# Patient Record
Sex: MALE | Race: WHITE | Employment: OTHER | URBAN - METROPOLITAN AREA
[De-identification: names, ages, dates, MRNs, and addresses within clinical notes are randomized per-mention and may not be internally consistent; named-entity substitution may affect disease eponyms.]

---

## 2020-01-17 ENCOUNTER — ED HISTORICAL/CONVERTED ENCOUNTER (OUTPATIENT)
Dept: OTHER | Age: 50
End: 2020-01-17

## 2020-07-27 VITALS — SYSTOLIC BLOOD PRESSURE: 144 MMHG | TEMPERATURE: 97.7 F | DIASTOLIC BLOOD PRESSURE: 85 MMHG | HEIGHT: 69 IN

## 2020-07-27 PROBLEM — I10 HYPERTENSION: Status: ACTIVE | Noted: 2020-07-27

## 2020-07-27 PROBLEM — I82.409 DVT (DEEP VENOUS THROMBOSIS) (HCC): Status: ACTIVE | Noted: 2020-07-27

## 2020-09-03 VITALS — SYSTOLIC BLOOD PRESSURE: 144 MMHG | TEMPERATURE: 97.7 F | DIASTOLIC BLOOD PRESSURE: 85 MMHG | HEIGHT: 69 IN

## 2020-09-03 PROBLEM — R30.0 DYSURIA: Status: ACTIVE | Noted: 2020-09-03

## 2020-09-03 RX ORDER — CIPROFLOXACIN 500 MG/1
TABLET ORAL 2 TIMES DAILY
COMMUNITY
End: 2022-08-18

## 2020-09-03 RX ORDER — CEFUROXIME AXETIL 500 MG/1
TABLET ORAL 2 TIMES DAILY
COMMUNITY
End: 2022-08-18

## 2020-09-03 RX ORDER — SULFAMETHOXAZOLE AND TRIMETHOPRIM 800; 160 MG/1; MG/1
1 TABLET ORAL 2 TIMES DAILY
COMMUNITY
End: 2022-08-18

## 2020-09-03 RX ORDER — LISINOPRIL AND HYDROCHLOROTHIAZIDE 20; 25 MG/1; MG/1
TABLET ORAL DAILY
COMMUNITY
End: 2022-08-18

## 2022-03-18 PROBLEM — I82.409 DVT (DEEP VENOUS THROMBOSIS) (HCC): Status: ACTIVE | Noted: 2020-07-27

## 2022-03-18 PROBLEM — R30.0 DYSURIA: Status: ACTIVE | Noted: 2020-09-03

## 2022-03-19 PROBLEM — I10 HYPERTENSION: Status: ACTIVE | Noted: 2020-07-27

## 2022-08-17 ENCOUNTER — HOSPITAL ENCOUNTER (EMERGENCY)
Age: 52
Discharge: HOME | DRG: 872 | End: 2022-08-17
Attending: EMERGENCY MEDICINE
Payer: MEDICARE

## 2022-08-17 ENCOUNTER — APPOINTMENT (OUTPATIENT)
Dept: ULTRASOUND IMAGING | Age: 52
DRG: 872 | End: 2022-08-17
Attending: EMERGENCY MEDICINE
Payer: MEDICARE

## 2022-08-17 VITALS
DIASTOLIC BLOOD PRESSURE: 67 MMHG | HEART RATE: 60 BPM | BODY MASS INDEX: 42.66 KG/M2 | RESPIRATION RATE: 18 BRPM | HEIGHT: 72 IN | WEIGHT: 315 LBS | OXYGEN SATURATION: 97 % | TEMPERATURE: 98 F | SYSTOLIC BLOOD PRESSURE: 128 MMHG

## 2022-08-17 DIAGNOSIS — R33.9 URINARY RETENTION: Primary | ICD-10-CM

## 2022-08-17 DIAGNOSIS — N50.89 SCROTAL SWELLING: ICD-10-CM

## 2022-08-17 LAB
APPEARANCE UR: ABNORMAL
BACTERIA URNS QL MICRO: ABNORMAL /HPF
BILIRUB UR QL: NEGATIVE
COLOR UR: ABNORMAL
GLUCOSE UR STRIP.AUTO-MCNC: NEGATIVE MG/DL
HGB UR QL STRIP: ABNORMAL
KETONES UR QL STRIP.AUTO: 15 MG/DL
LEUKOCYTE ESTERASE UR QL STRIP.AUTO: ABNORMAL
MUCOUS THREADS URNS QL MICRO: ABNORMAL /LPF
NITRITE UR QL STRIP.AUTO: NEGATIVE
PH UR STRIP: >8.5 [PH]
PROT UR STRIP-MCNC: 100 MG/DL
RBC #/AREA URNS HPF: >100 /HPF (ref 0–5)
SP GR UR REFRACTOMETRY: 1.01 (ref 1–1.03)
UA: UC IF INDICATED,UAUC: ABNORMAL
UROBILINOGEN UR QL STRIP.AUTO: 0.1 EU/DL (ref 0.2–1)
WBC URNS QL MICRO: ABNORMAL /HPF (ref 0–4)

## 2022-08-17 PROCEDURE — 76870 US EXAM SCROTUM: CPT

## 2022-08-17 PROCEDURE — 74011250637 HC RX REV CODE- 250/637: Performed by: EMERGENCY MEDICINE

## 2022-08-17 PROCEDURE — 81001 URINALYSIS AUTO W/SCOPE: CPT

## 2022-08-17 PROCEDURE — 87186 SC STD MICRODIL/AGAR DIL: CPT

## 2022-08-17 PROCEDURE — 87086 URINE CULTURE/COLONY COUNT: CPT

## 2022-08-17 PROCEDURE — 87077 CULTURE AEROBIC IDENTIFY: CPT

## 2022-08-17 RX ORDER — FUROSEMIDE 20 MG/1
20 TABLET ORAL DAILY
Qty: 5 TABLET | Refills: 0 | Status: SHIPPED | OUTPATIENT
Start: 2022-08-17 | End: 2022-08-25

## 2022-08-17 RX ORDER — BUMETANIDE 1 MG/1
2 TABLET ORAL
Status: COMPLETED | OUTPATIENT
Start: 2022-08-17 | End: 2022-08-17

## 2022-08-17 RX ADMIN — BUMETANIDE 2 MG: 1 TABLET ORAL at 20:09

## 2022-08-17 NOTE — ED TRIAGE NOTES
Pt had fall on Sunday, has had pain and swelling to testicle(s) for 24 hours, along with incontinence when he lies down. A&O x 4, denies striking his head.

## 2022-08-17 NOTE — DISCHARGE INSTRUCTIONS
Thank you! Thank you for allowing me to care for you in the emergency department. It is my goal to provide you with excellent care. If you have not received excellent quality care, please ask to speak to the nurse manager. Please fill out the survey that will come to you by mail or email since we listen to your feedback! Below you will find a list of your tests from today's visit. Should you have any questions, please do not hesitate to call the emergency department. Labs  No results found for this or any previous visit (from the past 12 hour(s)). Radiologic Studies  US SCROTUM/TESTICLES   Final Result      1. Extensive scrotal soft tissue swelling/thickening. 2. No focal fluid collection to suggest abscess. 3. Normal sonographic imaging of the testes. CT Results  (Last 48 hours)      None          CXR Results  (Last 48 hours)      None          ------------------------------------------------------------------------------------------------------------  The exam and treatment you received in the Emergency Department were for an urgent problem and are not intended as complete care. It is important that you follow-up with a doctor, nurse practitioner, or physician assistant to:  (1) confirm your diagnosis,  (2) re-evaluation of changes in your illness and treatment, and  (3) for ongoing care. Please take your discharge instructions with you when you go to your follow-up appointment. If you have any problem arranging a follow-up appointment, contact the Emergency Department. If your symptoms become worse or you do not improve as expected and you are unable to reach your health care provider, please return to the Emergency Department. We are available 24 hours a day. If a prescription has been provided, please have it filled as soon as possible to prevent a delay in treatment.  If you have any questions or reservations about taking the medication due to side effects or interactions with other medications, please call your primary care provider or contact the ER.

## 2022-08-17 NOTE — ED PROVIDER NOTES
EMERGENCY DEPARTMENT HISTORY AND PHYSICAL EXAM      Date: 8/17/2022  Patient Name: Katelyn Sims. Patient Age and Sex: 47 y.o. male     History of Presenting Illness     Chief Complaint   Patient presents with    Testicle Pain       History Provided By: Patient    HPI: Katelyn Sims. is a 50-year-old male with a history of hypertension, penile surgery in the past, morbid obesity, presenting for difficulty urinating and testicle swelling. Patient states that 3 days ago he fell on his rear end. Patient states that yesterday started noticed swelling of his scrotum but no blood noted. His penis was also swollen and he has been having increased difficulty urinating. States that this has happened before and he needed a Siddiqi to be placed. Does have a urologist.  States he had a penile surgery but cannot remember exactly what it was. There are no other complaints, changes, or physical findings at this time. PCP: Martin Raymundo, DO    No current facility-administered medications on file prior to encounter. Current Outpatient Medications on File Prior to Encounter   Medication Sig Dispense Refill    trimethoprim-sulfamethoxazole (BACTRIM DS, SEPTRA DS) 160-800 mg per tablet Take 1 Tab by mouth two (2) times a day. cefUROXime (CEFTIN) 500 mg tablet Take  by mouth two (2) times a day. ciprofloxacin HCl (Cipro) 500 mg tablet Take  by mouth two (2) times a day. lisinopril-hydroCHLOROthiazide (PRINZIDE, ZESTORETIC) 20-25 mg per tablet Take  by mouth daily.          Past History     Past Medical History:  Past Medical History:   Diagnosis Date    DVT (deep venous thrombosis) ([facility] [address]     Hypertension 7/27/2020       Past Surgical History:  Past Surgical History:   Procedure Laterality Date    HX ORTHOPAEDIC      Ankle    HX ORTHOPAEDIC      Tumors removed from left knee     HX UROLOGICAL      Penile surgery       Family History:  Family History   Problem Relation Age of Onset    Hypertension Mother     Lung Disease Father        Social History:  Social History     Tobacco Use    Smoking status: Never    Smokeless tobacco: Never   Substance Use Topics    Drug use: Not Currently       Allergies:  No Known Allergies      Review of Systems   Review of Systems   Constitutional:  Negative for chills and fever. Respiratory:  Negative for cough and shortness of breath. Cardiovascular:  Negative for chest pain. Gastrointestinal:  Negative for abdominal pain, constipation, diarrhea, nausea and vomiting. Genitourinary:  Positive for difficulty urinating, penile swelling, scrotal swelling and testicular pain. Negative for dysuria, frequency, hematuria and penile discharge. Neurological:  Negative for weakness and numbness. All other systems reviewed and are negative. Physical Exam   Physical Exam  Vitals and nursing note reviewed. Constitutional:       Appearance: He is well-developed. He is obese. HENT:      Head: Normocephalic and atraumatic. Nose: Nose normal.      Mouth/Throat:      Mouth: Mucous membranes are moist.   Eyes:      Extraocular Movements: Extraocular movements intact. Conjunctiva/sclera: Conjunctivae normal.   Cardiovascular:      Rate and Rhythm: Normal rate and regular rhythm. Pulmonary:      Effort: Pulmonary effort is normal. No respiratory distress. Breath sounds: Normal breath sounds. Abdominal:      General: There is no distension. Palpations: Abdomen is soft. Tenderness: There is no abdominal tenderness. Genitourinary:     Comments: Patient does appear to have a splint penis very swollen over the shaft and retracted. No tenderness there. His scrotum is very swollen with no significant tenderness over the testicle or the epididymis. Musculoskeletal:         General: Normal range of motion. Cervical back: Normal range of motion and neck supple. Right lower leg: Edema present. Left lower leg: Edema present.    Skin: General: Skin is warm and dry. Neurological:      General: No focal deficit present. Mental Status: He is alert and oriented to person, place, and time. Mental status is at baseline. Psychiatric:         Mood and Affect: Mood normal.        Diagnostic Study Results     Labs -   No results found for this or any previous visit (from the past 12 hour(s)). Radiologic Studies -   US SCROTUM/TESTICLES   Final Result      1. Extensive scrotal soft tissue swelling/thickening. 2. No focal fluid collection to suggest abscess. 3. Normal sonographic imaging of the testes. CT Results  (Last 48 hours)      None          CXR Results  (Last 48 hours)      None              Medical Decision Making   I am the first provider for this patient. I reviewed the vital signs, available nursing notes, past medical history, past surgical history, family history and social history. Vital Signs-Reviewed the patient's vital signs. Patient Vitals for the past 12 hrs:   Temp Pulse Resp BP SpO2   08/17/22 2009 -- 60 -- 128/67 --   08/17/22 1700 98 °F (36.7 °C) (!) 55 18 122/72 97 %       Records Reviewed: Nursing Notes and Old Medical Records    Provider Notes (Medical Decision Making):   Presenting with testicular and scrotum swelling as well as penile swelling with urinary retention issues. Differential includes retention secondary to prostate issues urinary tract infection, anatomical issues, orchitis, testicular torsion, edema. Plan will be to get scrotal ultrasound, place Siddiqi, give Bumex. ED Course:   Initial assessment performed. The patients presenting problems have been discussed, and they are in agreement with the care plan formulated and outlined with them. I have encouraged them to ask questions as they arise throughout their visit.     ED Course as of 08/17/22 2108   Wed Aug 17, 2022   1719  s/p surgical excision of fungating penile mass with incision and drainage of scrotal abscess, liberation of buried penis, phalloplasty, local tissue rearrangement using scrotal skin, and urethral dilation on 08/17/2020 with Dr. Harvey Kline. 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 [JS]   1938 Urinalysis machine is down so long turnaround times. Patient fine with me calling him if positive urinalysis. [JS]      ED Course User Index  [JS] Michelle Gaytan MD     Critical Care Time:   0    Disposition:  Discharge Note:  The patient has been re-evaluated and is ready for discharge. Reviewed available results with patient. Counseled patient on diagnosis and care plan. Patient has expressed understanding, and all questions have been answered. Patient agrees with plan and agrees to follow up as recommended, or to return to the ED if their symptoms worsen. Discharge instructions have been provided and explained to the patient, along with reasons to return to the ED. PLAN:  Discharge Medication List as of 8/17/2022  7:53 PM        START taking these medications    Details   furosemide (Lasix) 20 mg tablet Take 1 Tablet by mouth daily for 5 days. , Normal, Disp-5 Tablet, R-0           CONTINUE these medications which have NOT CHANGED    Details   trimethoprim-sulfamethoxazole (BACTRIM DS, SEPTRA DS) 160-800 mg per tablet Take 1 Tab by mouth two (2) times a day., Historical Med      cefUROXime (CEFTIN) 500 mg tablet Take  by mouth two (2) times a day., Historical Med      ciprofloxacin HCl (Cipro) 500 mg tablet Take  by mouth two (2) times a day., Historical Med      lisinopril-hydroCHLOROthiazide (PRINZIDE, ZESTORETIC) 20-25 mg per tablet Take  by mouth daily. , Historical Med           2. Follow-up Information       Follow up With Specialties Details Why Contact Info    Your Urologist              3.  Return to ED if worse     Diagnosis     Clinical Impression:   1. Urinary retention    2.  Scrotal swelling        Attestations:  Luz BIRMINGHAM M.D., am the primary clinician of record. Please note that this dictation was completed with [employer] the [employer] voice recognition software. Quite often unanticipated grammatical, syntax, homophones, and other interpretive errors are inadvertently transcribed by the computer software. Please disregard these errors. Please excuse any errors that have escaped final proofreading. Thank you.

## 2022-08-18 ENCOUNTER — HOSPITAL ENCOUNTER (INPATIENT)
Age: 52
LOS: 7 days | Discharge: HOME HEALTH | DRG: 872 | End: 2022-08-25
Attending: EMERGENCY MEDICINE | Admitting: HOSPITALIST
Payer: MEDICARE

## 2022-08-18 ENCOUNTER — APPOINTMENT (OUTPATIENT)
Dept: GENERAL RADIOLOGY | Age: 52
DRG: 872 | End: 2022-08-18
Attending: EMERGENCY MEDICINE
Payer: MEDICARE

## 2022-08-18 DIAGNOSIS — R65.10 SIRS (SYSTEMIC INFLAMMATORY RESPONSE SYNDROME) (HCC): ICD-10-CM

## 2022-08-18 DIAGNOSIS — I89.0 LYMPHEDEMA: ICD-10-CM

## 2022-08-18 DIAGNOSIS — N30.00 ACUTE CYSTITIS WITHOUT HEMATURIA: ICD-10-CM

## 2022-08-18 DIAGNOSIS — I48.91 ATRIAL FIBRILLATION, UNSPECIFIED TYPE (HCC): Primary | ICD-10-CM

## 2022-08-18 DIAGNOSIS — R31.0 HEMATURIA, GROSS: ICD-10-CM

## 2022-08-18 DIAGNOSIS — E66.01 MORBID OBESITY WITH BMI OF 70 AND OVER, ADULT (HCC): ICD-10-CM

## 2022-08-18 DIAGNOSIS — N49.2 CELLULITIS OF SCROTUM: ICD-10-CM

## 2022-08-18 DIAGNOSIS — N50.89 TESTICULAR SWELLING: ICD-10-CM

## 2022-08-18 DIAGNOSIS — A41.9 SEPSIS WITHOUT ACUTE ORGAN DYSFUNCTION, DUE TO UNSPECIFIED ORGANISM (HCC): ICD-10-CM

## 2022-08-18 PROBLEM — N17.9 ACUTE RENAL FAILURE (ARF) (HCC): Status: ACTIVE | Noted: 2022-08-18

## 2022-08-18 PROBLEM — N17.9 ACUTE RENAL FAILURE (HCC): Status: ACTIVE | Noted: 2022-08-18

## 2022-08-18 LAB
ALBUMIN SERPL-MCNC: 2.1 G/DL (ref 3.5–5)
ALBUMIN/GLOB SERPL: 0.4 {RATIO} (ref 1.1–2.2)
ALP SERPL-CCNC: 146 U/L (ref 45–117)
ALT SERPL-CCNC: 29 U/L (ref 12–78)
ANION GAP SERPL CALC-SCNC: 10 MMOL/L (ref 5–15)
AST SERPL W P-5'-P-CCNC: 44 U/L (ref 15–37)
BACTERIA URNS QL MICRO: ABNORMAL /HPF
BASOPHILS # BLD: 0 K/UL (ref 0–0.1)
BASOPHILS NFR BLD: 0 % (ref 0–1)
BILIRUB SERPL-MCNC: 2.8 MG/DL (ref 0.2–1)
BNP SERPL-MCNC: 7702 PG/ML
BUN SERPL-MCNC: 62 MG/DL (ref 6–20)
BUN/CREAT SERPL: 28 (ref 12–20)
CA-I BLD-MCNC: 8.9 MG/DL (ref 8.5–10.1)
CHLORIDE SERPL-SCNC: 100 MMOL/L (ref 97–108)
CK SERPL-CCNC: 29 U/L (ref 39–308)
CO2 SERPL-SCNC: 22 MMOL/L (ref 21–32)
CREAT SERPL-MCNC: 2.2 MG/DL (ref 0.7–1.3)
CRP SERPL-MCNC: 13.6 MG/DL (ref 0–0.6)
DIFFERENTIAL METHOD BLD: ABNORMAL
EOSINOPHIL # BLD: 0 K/UL (ref 0–0.4)
EOSINOPHIL NFR BLD: 0 % (ref 0–7)
ERYTHROCYTE [DISTWIDTH] IN BLOOD BY AUTOMATED COUNT: 15.5 % (ref 11.5–14.5)
ERYTHROCYTE [SEDIMENTATION RATE] IN BLOOD: 94 MM/HR (ref 0–20)
GLOBULIN SER CALC-MCNC: 5.4 G/DL (ref 2–4)
GLUCOSE SERPL-MCNC: 178 MG/DL (ref 65–100)
HCT VFR BLD AUTO: 35.1 % (ref 36.6–50.3)
HGB BLD-MCNC: 11.2 G/DL (ref 12.1–17)
IMM GRANULOCYTES # BLD AUTO: 0 K/UL
IMM GRANULOCYTES NFR BLD AUTO: 0 %
INR PPP: 1.1 (ref 0.9–1.1)
LYMPHOCYTES # BLD: 2.8 K/UL (ref 0.8–3.5)
LYMPHOCYTES NFR BLD: 16 % (ref 12–49)
MAGNESIUM SERPL-MCNC: 2.5 MG/DL (ref 1.6–2.4)
MCH RBC QN AUTO: 27.5 PG (ref 26–34)
MCHC RBC AUTO-ENTMCNC: 31.9 G/DL (ref 30–36.5)
MCV RBC AUTO: 86 FL (ref 80–99)
MONOCYTES # BLD: 1.2 K/UL (ref 0–1)
MONOCYTES NFR BLD: 7 % (ref 5–13)
MUCOUS THREADS URNS QL MICRO: ABNORMAL /LPF
NEUTS SEG # BLD: 13.3 K/UL (ref 1.8–8)
NEUTS SEG NFR BLD: 76 % (ref 32–75)
NRBC # BLD: 0 K/UL (ref 0–0.01)
NRBC BLD-RTO: 0 PER 100 WBC
OTHER CELLS NFR BLD MANUAL: 1 %
PLATELET # BLD AUTO: 117 K/UL (ref 150–400)
PMV BLD AUTO: 10.9 FL (ref 8.9–12.9)
POTASSIUM SERPL-SCNC: 4.6 MMOL/L (ref 3.5–5.1)
PROCALCITONIN SERPL-MCNC: 10.44 NG/ML
PROT SERPL-MCNC: 7.5 G/DL (ref 6.4–8.2)
PROTHROMBIN TIME: 14.3 SEC (ref 11.9–14.6)
RBC # BLD AUTO: 4.08 M/UL (ref 4.1–5.7)
RBC #/AREA URNS HPF: ABNORMAL /HPF (ref 0–5)
RBC MORPH BLD: ABNORMAL
SODIUM SERPL-SCNC: 132 MMOL/L (ref 136–145)
TROPONIN-HIGH SENSITIVITY: 7 NG/L (ref 0–76)
TROPONIN-HIGH SENSITIVITY: 7 NG/L (ref 0–76)
TSH SERPL DL<=0.05 MIU/L-ACNC: 4.74 UIU/ML (ref 0.36–3.74)
URATE SERPL-MCNC: 13.3 MG/DL (ref 3.5–7.2)
WBC # BLD AUTO: 17.5 K/UL (ref 4.1–11.1)
WBC URNS QL MICRO: ABNORMAL /HPF (ref 0–4)

## 2022-08-18 PROCEDURE — 74011000258 HC RX REV CODE- 258: Performed by: HOSPITALIST

## 2022-08-18 PROCEDURE — 82550 ASSAY OF CK (CPK): CPT

## 2022-08-18 PROCEDURE — 87186 SC STD MICRODIL/AGAR DIL: CPT

## 2022-08-18 PROCEDURE — 86140 C-REACTIVE PROTEIN: CPT

## 2022-08-18 PROCEDURE — 81001 URINALYSIS AUTO W/SCOPE: CPT

## 2022-08-18 PROCEDURE — 96376 TX/PRO/DX INJ SAME DRUG ADON: CPT

## 2022-08-18 PROCEDURE — 85652 RBC SED RATE AUTOMATED: CPT

## 2022-08-18 PROCEDURE — 87040 BLOOD CULTURE FOR BACTERIA: CPT

## 2022-08-18 PROCEDURE — 84145 PROCALCITONIN (PCT): CPT

## 2022-08-18 PROCEDURE — 93005 ELECTROCARDIOGRAM TRACING: CPT

## 2022-08-18 PROCEDURE — 84484 ASSAY OF TROPONIN QUANT: CPT

## 2022-08-18 PROCEDURE — 74011000250 HC RX REV CODE- 250: Performed by: HOSPITALIST

## 2022-08-18 PROCEDURE — 99222 1ST HOSP IP/OBS MODERATE 55: CPT | Performed by: INTERNAL MEDICINE

## 2022-08-18 PROCEDURE — 74011000250 HC RX REV CODE- 250: Performed by: EMERGENCY MEDICINE

## 2022-08-18 PROCEDURE — 84443 ASSAY THYROID STIM HORMONE: CPT

## 2022-08-18 PROCEDURE — 65270000029 HC RM PRIVATE

## 2022-08-18 PROCEDURE — 80053 COMPREHEN METABOLIC PANEL: CPT

## 2022-08-18 PROCEDURE — 36415 COLL VENOUS BLD VENIPUNCTURE: CPT

## 2022-08-18 PROCEDURE — 71045 X-RAY EXAM CHEST 1 VIEW: CPT

## 2022-08-18 PROCEDURE — 85610 PROTHROMBIN TIME: CPT

## 2022-08-18 PROCEDURE — 74011000258 HC RX REV CODE- 258: Performed by: EMERGENCY MEDICINE

## 2022-08-18 PROCEDURE — 84550 ASSAY OF BLOOD/URIC ACID: CPT

## 2022-08-18 PROCEDURE — 94762 N-INVAS EAR/PLS OXIMTRY CONT: CPT

## 2022-08-18 PROCEDURE — 85025 COMPLETE CBC W/AUTO DIFF WBC: CPT

## 2022-08-18 PROCEDURE — 83880 ASSAY OF NATRIURETIC PEPTIDE: CPT

## 2022-08-18 PROCEDURE — 74011250636 HC RX REV CODE- 250/636: Performed by: EMERGENCY MEDICINE

## 2022-08-18 PROCEDURE — 74011250637 HC RX REV CODE- 250/637: Performed by: HOSPITALIST

## 2022-08-18 PROCEDURE — 96366 THER/PROPH/DIAG IV INF ADDON: CPT

## 2022-08-18 PROCEDURE — 96375 TX/PRO/DX INJ NEW DRUG ADDON: CPT

## 2022-08-18 PROCEDURE — 74011250636 HC RX REV CODE- 250/636: Performed by: HOSPITALIST

## 2022-08-18 PROCEDURE — 87077 CULTURE AEROBIC IDENTIFY: CPT

## 2022-08-18 PROCEDURE — 99285 EMERGENCY DEPT VISIT HI MDM: CPT

## 2022-08-18 PROCEDURE — 83735 ASSAY OF MAGNESIUM: CPT

## 2022-08-18 PROCEDURE — 96365 THER/PROPH/DIAG IV INF INIT: CPT

## 2022-08-18 RX ORDER — MORPHINE SULFATE 4 MG/ML
4 INJECTION INTRAVENOUS ONCE
Status: COMPLETED | OUTPATIENT
Start: 2022-08-18 | End: 2022-08-18

## 2022-08-18 RX ORDER — SODIUM CHLORIDE 0.9 % (FLUSH) 0.9 %
5-40 SYRINGE (ML) INJECTION AS NEEDED
Status: DISCONTINUED | OUTPATIENT
Start: 2022-08-18 | End: 2022-08-26 | Stop reason: HOSPADM

## 2022-08-18 RX ORDER — ONDANSETRON 2 MG/ML
4 INJECTION INTRAMUSCULAR; INTRAVENOUS
Status: DISCONTINUED | OUTPATIENT
Start: 2022-08-18 | End: 2022-08-26 | Stop reason: HOSPADM

## 2022-08-18 RX ORDER — ACETAMINOPHEN 325 MG/1
650 TABLET ORAL
Status: DISCONTINUED | OUTPATIENT
Start: 2022-08-18 | End: 2022-08-26 | Stop reason: HOSPADM

## 2022-08-18 RX ORDER — ACETAMINOPHEN 650 MG/1
650 SUPPOSITORY RECTAL
Status: DISCONTINUED | OUTPATIENT
Start: 2022-08-18 | End: 2022-08-26 | Stop reason: HOSPADM

## 2022-08-18 RX ORDER — ONDANSETRON 4 MG/1
4 TABLET, ORALLY DISINTEGRATING ORAL
Status: DISCONTINUED | OUTPATIENT
Start: 2022-08-18 | End: 2022-08-26 | Stop reason: HOSPADM

## 2022-08-18 RX ORDER — ENOXAPARIN SODIUM 100 MG/ML
40 INJECTION SUBCUTANEOUS DAILY
Status: DISCONTINUED | OUTPATIENT
Start: 2022-08-19 | End: 2022-08-19

## 2022-08-18 RX ORDER — SODIUM CHLORIDE 0.9 % (FLUSH) 0.9 %
5-40 SYRINGE (ML) INJECTION EVERY 8 HOURS
Status: DISCONTINUED | OUTPATIENT
Start: 2022-08-18 | End: 2022-08-26 | Stop reason: HOSPADM

## 2022-08-18 RX ORDER — DILTIAZEM HYDROCHLORIDE 5 MG/ML
20 INJECTION INTRAVENOUS
Status: COMPLETED | OUTPATIENT
Start: 2022-08-18 | End: 2022-08-18

## 2022-08-18 RX ADMIN — SODIUM CHLORIDE, PRESERVATIVE FREE 10 ML: 5 INJECTION INTRAVENOUS at 22:00

## 2022-08-18 RX ADMIN — DILTIAZEM HYDROCHLORIDE 15 MG/HR: 5 INJECTION, SOLUTION INTRAVENOUS at 20:54

## 2022-08-18 RX ADMIN — DILTIAZEM HYDROCHLORIDE 2.5 MG/HR: 5 INJECTION, SOLUTION INTRAVENOUS at 17:33

## 2022-08-18 RX ADMIN — LEVOFLOXACIN 750 MG: 500 TABLET, FILM COATED ORAL at 21:55

## 2022-08-18 RX ADMIN — MORPHINE SULFATE 4 MG: 4 INJECTION, SOLUTION INTRAMUSCULAR; INTRAVENOUS at 18:36

## 2022-08-18 RX ADMIN — DILTIAZEM HYDROCHLORIDE 10 MG/HR: 5 INJECTION, SOLUTION INTRAVENOUS at 19:25

## 2022-08-18 RX ADMIN — PIPERACILLIN AND TAZOBACTAM 4.5 G: 4; .5 INJECTION, POWDER, FOR SOLUTION INTRAVENOUS at 21:55

## 2022-08-18 RX ADMIN — SODIUM CHLORIDE 1000 ML: 9 INJECTION, SOLUTION INTRAVENOUS at 21:39

## 2022-08-18 RX ADMIN — DILTIAZEM HYDROCHLORIDE 20 MG: 5 INJECTION, SOLUTION INTRAVENOUS at 17:19

## 2022-08-18 RX ADMIN — MORPHINE SULFATE 4 MG: 4 INJECTION, SOLUTION INTRAMUSCULAR; INTRAVENOUS at 21:00

## 2022-08-18 NOTE — ED PROVIDER NOTES
EMERGENCY DEPARTMENT HISTORY AND PHYSICAL EXAM      Date: 8/18/2022  Patient Name: Kandi Claudio. History of Presenting Illness     Chief Complaint   Patient presents with    Testicle Pain       History Provided By: Patient and EMS    HPI: Kandi Malhotra, 47 y.o. male with a past medical history significant  for DVT and hypertension  presents to the ED with cc of continued scrotal pain today. Patient states that he was seen and evaluated yesterday and had a negative ultrasound. Patient arrives via EMS and his rate is in the 130s. Patient denies any fever, chills, nausea, vomiting, chest pain, shortness of breath, rash, diarrhea, headache, night sweats. Symptoms are mild to moderate and constant    There are no other complaints, changes, or physical findings at this time.     PCP: Radha Hemphill DO    Current Facility-Administered Medications   Medication Dose Route Frequency Provider Last Rate Last Admin    dilTIAZem (CARDIZEM) 125 mg in 0.9% sodium chloride 125 mL ([id number])  0-15 mg/hr IntraVENous TITRATE Radha Heart MD 15 mL/hr at 08/18/22 2054 15 mg/hr at 08/18/22 2054    sodium chloride (NS) flush 5-40 mL  5-40 mL IntraVENous Q8H Deonte Dennis MD   10 mL at 08/18/22 2200    sodium chloride (NS) flush 5-40 mL  5-40 mL IntraVENous PRN Deonte Dennis MD        acetaminophen (TYLENOL) tablet 650 mg  650 mg Oral Q6H PRN Deonte Dennis MD        Or    acetaminophen (TYLENOL) suppository 650 mg  650 mg Rectal Q6H PRN Deonte Dennis MD        ondansetron (ZOFRAN ODT) tablet 4 mg  4 mg Oral Q8H PRN Deonte Dennis MD        Or    ondansetron (ZOFRAN) injection 4 mg  4 mg IntraVENous Q6H PRN Deonte Dennis MD        [START ON 8/19/2022] enoxaparin (LOVENOX) injection 40 mg  40 mg SubCUTAneous DAILY Deonte Dennis MD        levoFLOXacin (LEVAQUIN) tablet 750 mg  750 mg Oral Q24H Deonte Dennis MD   750 mg at 08/18/22 2155 Current Outpatient Medications   Medication Sig Dispense Refill    furosemide (Lasix) 20 mg tablet Take 1 Tablet by mouth daily for 5 days. 5 Tablet 0       Past History   Past Medical History:  Past Medical History:   Diagnosis Date    DVT (deep venous thrombosis) ([facility] [address]     Hypertension 7/27/2020       Past Surgical History:  Past Surgical History:   Procedure Laterality Date    HX ORTHOPAEDIC      Ankle    HX ORTHOPAEDIC      Tumors removed from left knee     HX UROLOGICAL      Penile surgery       Family History:  Family History   Problem Relation Age of Onset    Hypertension Mother     Lung Disease Father        Social History:  Social History     Tobacco Use    Smoking status: Never    Smokeless tobacco: Never   Substance Use Topics    Alcohol use: Not Currently    Drug use: Not Currently       Allergies:  No Known Allergies  Review of Systems   Review of Systems   Constitutional: Negative. Negative for appetite change, chills, fatigue and fever. HENT: Negative. Negative for congestion and sinus pain. Eyes: Negative. Negative for pain and visual disturbance. Respiratory: Negative. Negative for chest tightness and shortness of breath. Cardiovascular: Negative. Negative for chest pain. Gastrointestinal: Negative. Negative for abdominal pain, diarrhea, nausea and vomiting. Genitourinary:  Positive for testicular pain. Negative for difficulty urinating. No discharge   Musculoskeletal: Negative. Negative for arthralgias. Skin: Negative. Negative for rash. Neurological: Negative. Negative for weakness and headaches. Hematological: Negative. Psychiatric/Behavioral: Negative. Negative for agitation. The patient is not nervous/anxious. All other systems reviewed and are negative. Physical Exam   Physical Exam  Vitals and nursing note reviewed. Constitutional:       General: He is not in acute distress. Appearance: He is well-developed.       Comments: Morbidly obese HENT:      Head: Normocephalic and atraumatic. Nose: Nose normal.      Mouth/Throat:      Mouth: Mucous membranes are moist.      Pharynx: Oropharynx is clear. No oropharyngeal exudate. Eyes:      General:         Right eye: No discharge. Left eye: No discharge. Conjunctiva/sclera: Conjunctivae normal.      Pupils: Pupils are equal, round, and reactive to light. Cardiovascular:      Rate and Rhythm: Tachycardia present. Rhythm irregular. Chest Wall: PMI is not displaced. No thrill. Heart sounds: Normal heart sounds. No murmur heard. No friction rub. No gallop. Pulmonary:      Effort: Pulmonary effort is normal. No respiratory distress. Breath sounds: Normal breath sounds. No wheezing or rales. Chest:      Chest wall: No tenderness. Abdominal:      General: Bowel sounds are normal. There is no distension. Palpations: Abdomen is soft. There is no mass. Tenderness: There is no abdominal tenderness. There is no guarding or rebound. Genitourinary:     Comments: Scrotal swelling with cellulitis. Musculoskeletal:         General: Normal range of motion. Cervical back: Normal range of motion and neck supple. Lymphadenopathy:      Cervical: No cervical adenopathy. Skin:     General: Skin is warm and dry. Capillary Refill: Capillary refill takes less than 2 seconds. Findings: No erythema or rash. Neurological:      Mental Status: He is alert and oriented to person, place, and time. Cranial Nerves: No cranial nerve deficit.       Coordination: Coordination normal.   Psychiatric:         Mood and Affect: Mood normal.         Behavior: Behavior normal.       Lab and Diagnostic Study Results   Labs -     Recent Results (from the past 12 hour(s))   TROPONIN-HIGH SENSITIVITY    Collection Time: 08/18/22  6:30 PM   Result Value Ref Range    Troponin-High Sensitivity 7 0 - 76 ng/L   NT-PRO BNP    Collection Time: 08/18/22  6:30 PM   Result Value Ref Range    NT pro-BNP 7,702 (H) <631 pg/mL   METABOLIC PANEL, COMPREHENSIVE    Collection Time: 08/18/22  7:32 PM   Result Value Ref Range    Sodium 132 (L) 136 - 145 mmol/L    Potassium 4.6 3.5 - 5.1 mmol/L    Chloride 100 97 - 108 mmol/L    CO2 22 21 - 32 mmol/L    Anion gap 10 5 - 15 mmol/L    Glucose 178 (H) 65 - 100 mg/dL    BUN 62 (H) 6 - 20 mg/dL    Creatinine 2.20 (H) 0.70 - 1.30 mg/dL    BUN/Creatinine ratio 28 (H) 12 - 20      GFR est AA 38 (L) >60 ml/min/1.73m2    GFR est non-AA 32 (L) >60 ml/min/1.73m2    Calcium 8.9 8.5 - 10.1 mg/dL    Bilirubin, total 2.8 (H) 0.2 - 1.0 mg/dL    AST (SGOT) 44 (H) 15 - 37 U/L    ALT (SGPT) 29 12 - 78 U/L    Alk. phosphatase 146 (H) 45 - 117 U/L    Protein, total 7.5 6.4 - 8.2 g/dL    Albumin 2.1 (L) 3.5 - 5.0 g/dL    Globulin 5.4 (H) 2.0 - 4.0 g/dL    A-G Ratio 0.4 (L) 1.1 - 2.2     MAGNESIUM    Collection Time: 08/18/22  7:32 PM   Result Value Ref Range    Magnesium 2.5 (H) 1.6 - 2.4 mg/dL   PROTHROMBIN TIME + INR    Collection Time: 08/18/22  7:32 PM   Result Value Ref Range    Prothrombin time 14.3 11.9 - 14.6 sec    INR 1.1 0.9 - 1.1     CBC WITH AUTOMATED DIFF    Collection Time: 08/18/22  7:32 PM   Result Value Ref Range    WBC 17.5 (H) 4.1 - 11.1 K/uL    RBC 4.08 (L) 4.10 - 5.70 M/uL    HGB 11.2 (L) 12.1 - 17.0 g/dL    HCT 35.1 (L) 36.6 - 50.3 %    MCV 86.0 80.0 - 99.0 FL    MCH 27.5 26.0 - 34.0 PG    MCHC 31.9 30.0 - 36.5 g/dL    RDW 15.5 (H) 11.5 - 14.5 %    PLATELET 225 (L) 344 - 400 K/uL    MPV 10.9 8.9 - 12.9 FL    NRBC 0.0 0.0  WBC    ABSOLUTE NRBC 0.00 0.00 - 0.01 K/uL    NEUTROPHILS 76 (H) 32 - 75 %    LYMPHOCYTES 16 12 - 49 %    MONOCYTES 7 5 - 13 %    EOSINOPHILS 0 0 - 7 %    BASOPHILS 0 0 - 1 %    OTHER CELL 1 %    IMMATURE GRANULOCYTES 0 %    ABS. NEUTROPHILS 13.3 (H) 1.8 - 8.0 K/UL    ABS. LYMPHOCYTES 2.8 0.8 - 3.5 K/UL    ABS. MONOCYTES 1.2 (H) 0.0 - 1.0 K/UL    ABS. EOSINOPHILS 0.0 0.0 - 0.4 K/UL    ABS. BASOPHILS 0.0 0.0 - 0.1 K/UL    ABS. IMM. Kristieashok Simonnels. 0.0 K/UL    DF Manual      RBC COMMENTS Normocytic, Normochromic     TROPONIN-HIGH SENSITIVITY    Collection Time: 08/18/22  7:32 PM   Result Value Ref Range    Troponin-High Sensitivity 7 0 - 76 ng/L   URIC ACID    Collection Time: 08/18/22  7:32 PM   Result Value Ref Range    Uric acid 13.3 (H) 3.5 - 7.2 mg/dL   TSH 3RD GENERATION    Collection Time: 08/18/22  7:32 PM   Result Value Ref Range    TSH 4.74 (H) 0.36 - 3.74 uIU/mL   CK    Collection Time: 08/18/22  7:32 PM   Result Value Ref Range    CK 29 (L) 39 - 308 U/L   C REACTIVE PROTEIN, QT    Collection Time: 08/18/22  7:32 PM   Result Value Ref Range    C-Reactive protein 13.60 (H) 0.00 - 0.60 mg/dL   SED RATE (ESR)    Collection Time: 08/18/22  7:32 PM   Result Value Ref Range    Sed rate, automated 94 (H) 0 - 20 mm/hr   PROCALCITONIN    Collection Time: 08/18/22  7:32 PM   Result Value Ref Range    Procalcitonin 10.44 (H) 0 ng/mL   URINALYSIS W/ RFLX MICROSCOPIC    Collection Time: 08/18/22  7:34 PM   Result Value Ref Range    Color Yellow/Straw      Appearance Turbid (A) Clear      Specific gravity 1.010 1.003 - 1.030      pH (UA) 6.0      Protein Negative Negative mg/dL    Glucose Negative Negative mg/dL    Ketone Negative Negative mg/dL    Bilirubin Positive (A) Negative      Blood Large (A) Negative      Urobilinogen 4.0 (H) 0.2 - 1.0 EU/dL    Nitrites Negative Negative      Leukocyte Esterase Large (A) Negative     URINE MICROSCOPIC    Collection Time: 08/18/22  7:34 PM   Result Value Ref Range    WBC 20-50 0 - 4 /hpf    RBC 0-5 0 - 5 /hpf    Bacteria 1+ (A) Negative /hpf    Mucus Trace (A) Negative /lpf       Radiologic Studies -   [unfilled]  CT Results  (Last 48 hours)      None          CXR Results  (Last 48 hours)                 08/18/22 1747  XR CHEST PORT Final result    Impression:  Limited examination with no acute infiltrate suspected. Narrative:  Medical indication: A. fib.        Portable AP semiupright view of the chest obtained no prior. Patient's body   habitus limits the evaluation, the inspiration is shallow. No definite focal   infiltrate suspected. Next                   Medical Decision Making and ED Course   - I am the first and primary provider for this patient AND AM THE PRIMARY PROVIDER OF RECORD. I reviewed the vital signs, available nursing notes, past medical history, past surgical history, family history and social history. - Initial assessment performed. The patients presenting problems have been discussed, and the staff are in agreement with the care plan formulated and outlined with them. I have encouraged them to ask questions as they arise throughout their visit. Differential Diagnosis & Medical Decision Making Provider Note:   Patient's scrotal ultrasound yesterday was negative. He is in A. fib with RVR at this point time. We will start with diltiazem    Vital Signs-Reviewed the patient's vital signs. Patient Vitals for the past 12 hrs:   Temp Pulse Resp BP SpO2   08/18/22 2327 -- (!) 147 24 (!) 120/96 95 %   08/18/22 2246 98.5 °F (36.9 °C) (!) 156 23 107/78 97 %   08/18/22 2157 98.2 °F (36.8 °C) (!) 138 20 101/72 98 %   08/18/22 2133 98.1 °F (36.7 °C) (!) 140 24 101/85 96 %   08/18/22 2100 -- (!) 152 17 92/78 98 %   08/18/22 2030 -- (!) 142 -- (!) 109/58 99 %   08/18/22 2000 98.9 °F (37.2 °C) (!) 149 -- 106/80 99 %   08/18/22 1933 98.6 °F (37 °C) (!) 146 20 111/89 99 %   08/18/22 1838 98.2 °F (36.8 °C) (!) 130 20 130/71 99 %   08/18/22 1749 -- (!) 136 20 113/76 100 %   08/18/22 1734 -- -- -- -- 100 %   08/18/22 1731 -- (!) 128 20 100/60 100 %   08/18/22 1712 98.2 °F (36.8 °C) (!) 145 20 (!) 138/106 100 %       EKG interpretation: (Preliminary): Performed at 1714. EKG Interpreted by me. Shows ventricular rate 159 bpm, ND interval unmeasurable, QRS duration 1 4 ms, QTC 4 and 65 ms. Interpretation: A. fib with RVR, nonspecific T wave abnormality, abnormal EKG.     ED Course:   ED Course as of 08/18/22 2349   Thu Aug 18, 2022   2119 Patient is in A. fib with RVR. Will be admitting to the hospital on a diltiazem drip. [CS]      ED Course User Index  [CS] Kelly Cramer MD           Procedures and Critical Care     Performed by: Virginia Bhagat MD  Procedures      CRITICAL CARE NOTE :  5:41 PM  Amount of Critical Care Time: 55 minutes    IMPENDING DETERIORATION -Cardiovascular  ASSOCIATED RISK FACTORS - Dysrhythmia  MANAGEMENT- Bedside Assessment and Supervision of Care  INTERPRETATION -  ECG, Blood Pressure, and Cardiac Output Measures   INTERVENTIONS - hemodynamic mngmt and Metobolic interventions  CASE REVIEW - Hospitalist/Intensivist  TREATMENT RESPONSE -Stable  PERFORMED BY - Self    NOTES   :  I have spent the above critical care time involved in lab review, consultations with specialist, family decision- making, bedside attention and documentation. This time excludes time spent in any separate billed procedures. During this entire length of time I was immediately available to the patient . Virginia Bhagat MD    Disposition   Disposition: Admitted to ICU Medical ICU the case was discussed with the admitting physician     Admitted    Diagnosis/Clinical Impression     Clinical Impression:   1. Atrial fibrillation, unspecified type [facility])        Attestations: Patrick Knox MD, am the primary clinician of record. Please note that this dictation was completed with [employer], the computer voice recognition software. Quite often unanticipated grammatical, syntax, homophones, and other interpretive errors are inadvertently transcribed by the computer software. Please disregard these errors. Please excuse any errors that have escaped final proofreading. Thank you.

## 2022-08-18 NOTE — ED TRIAGE NOTES
Patient is complaining of scrotal swelling, started 3 days ago. Seen yesterday for urinary retention seymour placed.

## 2022-08-19 ENCOUNTER — APPOINTMENT (OUTPATIENT)
Dept: CT IMAGING | Age: 52
DRG: 872 | End: 2022-08-19
Attending: NURSE PRACTITIONER
Payer: MEDICARE

## 2022-08-19 ENCOUNTER — APPOINTMENT (OUTPATIENT)
Dept: ULTRASOUND IMAGING | Age: 52
DRG: 872 | End: 2022-08-19
Attending: EMERGENCY MEDICINE
Payer: MEDICARE

## 2022-08-19 LAB
ALBUMIN SERPL-MCNC: 1.8 G/DL (ref 3.5–5)
ALBUMIN/GLOB SERPL: 0.3 {RATIO} (ref 1.1–2.2)
ALP SERPL-CCNC: 119 U/L (ref 45–117)
ALT SERPL-CCNC: 26 U/L (ref 12–78)
ANION GAP SERPL CALC-SCNC: 8 MMOL/L (ref 5–15)
APPEARANCE UR: ABNORMAL
APTT PPP: 29.5 SEC (ref 21.2–34.1)
APTT PPP: >153 SEC (ref 21.2–34.1)
APTT PPP: >153 SEC (ref 21.2–34.1)
AST SERPL W P-5'-P-CCNC: 27 U/L (ref 15–37)
BACTERIA URNS QL MICRO: NEGATIVE /HPF
BASOPHILS # BLD: 0 K/UL (ref 0–0.1)
BASOPHILS NFR BLD: 0 % (ref 0–1)
BILIRUB SERPL-MCNC: 2.3 MG/DL (ref 0.2–1)
BILIRUB UR QL: POSITIVE
BUN SERPL-MCNC: 54 MG/DL (ref 6–20)
BUN/CREAT SERPL: 31 (ref 12–20)
CA-I BLD-MCNC: 8.2 MG/DL (ref 8.5–10.1)
CHLORIDE SERPL-SCNC: 100 MMOL/L (ref 97–108)
CO2 SERPL-SCNC: 26 MMOL/L (ref 21–32)
COLOR UR: ABNORMAL
CREAT SERPL-MCNC: 1.75 MG/DL (ref 0.7–1.3)
DIFFERENTIAL METHOD BLD: ABNORMAL
EOSINOPHIL # BLD: 0 K/UL (ref 0–0.4)
EOSINOPHIL NFR BLD: 0 % (ref 0–7)
ERYTHROCYTE [DISTWIDTH] IN BLOOD BY AUTOMATED COUNT: 15.7 % (ref 11.5–14.5)
GLOBULIN SER CALC-MCNC: 5.3 G/DL (ref 2–4)
GLUCOSE SERPL-MCNC: 221 MG/DL (ref 65–100)
GLUCOSE UR STRIP.AUTO-MCNC: NEGATIVE MG/DL
HCT VFR BLD AUTO: 33.1 % (ref 36.6–50.3)
HGB BLD-MCNC: 10.4 G/DL (ref 12.1–17)
HGB UR QL STRIP: ABNORMAL
IMM GRANULOCYTES # BLD AUTO: 0 K/UL
IMM GRANULOCYTES NFR BLD AUTO: 0 %
KETONES UR QL STRIP.AUTO: NEGATIVE MG/DL
LACTATE SERPL-SCNC: 1.7 MMOL/L (ref 0.4–2)
LEUKOCYTE ESTERASE UR QL STRIP.AUTO: ABNORMAL
LYMPHOCYTES # BLD: 3.7 K/UL (ref 0.8–3.5)
LYMPHOCYTES NFR BLD: 17 % (ref 12–49)
MAGNESIUM SERPL-MCNC: 2.5 MG/DL (ref 1.6–2.4)
MCH RBC QN AUTO: 27.6 PG (ref 26–34)
MCHC RBC AUTO-ENTMCNC: 31.4 G/DL (ref 30–36.5)
MCV RBC AUTO: 87.8 FL (ref 80–99)
MONOCYTES # BLD: 1.7 K/UL (ref 0–1)
MONOCYTES NFR BLD: 8 % (ref 5–13)
MRSA DNA SPEC QL NAA+PROBE: NOT DETECTED
MUCOUS THREADS URNS QL MICRO: ABNORMAL /LPF
NEUTS SEG # BLD: 16.1 K/UL (ref 1.8–8)
NEUTS SEG NFR BLD: 75 % (ref 32–75)
NITRITE UR QL STRIP.AUTO: NEGATIVE
NRBC # BLD: 0 K/UL (ref 0–0.01)
NRBC BLD-RTO: 0 PER 100 WBC
PH UR STRIP: 6 [PH]
PHOSPHATE SERPL-MCNC: 3.9 MG/DL (ref 2.6–4.7)
PLATELET # BLD AUTO: 130 K/UL (ref 150–400)
PLATELET COMMENTS,PCOM: ABNORMAL
PMV BLD AUTO: 11.4 FL (ref 8.9–12.9)
POTASSIUM SERPL-SCNC: 3.6 MMOL/L (ref 3.5–5.1)
PROT SERPL-MCNC: 7.1 G/DL (ref 6.4–8.2)
PROT UR STRIP-MCNC: NEGATIVE MG/DL
RBC # BLD AUTO: 3.77 M/UL (ref 4.1–5.7)
RBC #/AREA URNS HPF: ABNORMAL /HPF (ref 0–5)
RBC MORPH BLD: ABNORMAL
SODIUM SERPL-SCNC: 134 MMOL/L (ref 136–145)
SP GR UR REFRACTOMETRY: 1.01 (ref 1–1.03)
THERAPEUTIC RANGE,PTTT: ABNORMAL SEC (ref 82–109)
THERAPEUTIC RANGE,PTTT: ABNORMAL SEC (ref 82–109)
THERAPEUTIC RANGE,PTTT: NORMAL SEC (ref 82–109)
UROBILINOGEN UR QL STRIP.AUTO: 4 EU/DL (ref 0.2–1)
WBC # BLD AUTO: 21.5 K/UL (ref 4.1–11.1)
WBC URNS QL MICRO: ABNORMAL /HPF (ref 0–4)

## 2022-08-19 PROCEDURE — 74011250637 HC RX REV CODE- 250/637: Performed by: HOSPITALIST

## 2022-08-19 PROCEDURE — 85025 COMPLETE CBC W/AUTO DIFF WBC: CPT

## 2022-08-19 PROCEDURE — 87641 MR-STAPH DNA AMP PROBE: CPT

## 2022-08-19 PROCEDURE — 74011000258 HC RX REV CODE- 258: Performed by: HOSPITALIST

## 2022-08-19 PROCEDURE — 74011250636 HC RX REV CODE- 250/636: Performed by: HOSPITALIST

## 2022-08-19 PROCEDURE — 83605 ASSAY OF LACTIC ACID: CPT

## 2022-08-19 PROCEDURE — 83735 ASSAY OF MAGNESIUM: CPT

## 2022-08-19 PROCEDURE — 74011000258 HC RX REV CODE- 258: Performed by: EMERGENCY MEDICINE

## 2022-08-19 PROCEDURE — 74011000250 HC RX REV CODE- 250: Performed by: HOSPITALIST

## 2022-08-19 PROCEDURE — 84100 ASSAY OF PHOSPHORUS: CPT

## 2022-08-19 PROCEDURE — 80053 COMPREHEN METABOLIC PANEL: CPT

## 2022-08-19 PROCEDURE — 74011250636 HC RX REV CODE- 250/636: Performed by: NURSE PRACTITIONER

## 2022-08-19 PROCEDURE — 99222 1ST HOSP IP/OBS MODERATE 55: CPT | Performed by: UROLOGY

## 2022-08-19 PROCEDURE — 65610000006 HC RM INTENSIVE CARE

## 2022-08-19 PROCEDURE — 85730 THROMBOPLASTIN TIME PARTIAL: CPT

## 2022-08-19 PROCEDURE — 74011000250 HC RX REV CODE- 250: Performed by: EMERGENCY MEDICINE

## 2022-08-19 RX ORDER — OXYCODONE HYDROCHLORIDE 5 MG/1
5 TABLET ORAL
Status: DISCONTINUED | OUTPATIENT
Start: 2022-08-19 | End: 2022-08-26 | Stop reason: HOSPADM

## 2022-08-19 RX ORDER — HEPARIN SODIUM 1000 [USP'U]/ML
5000 INJECTION, SOLUTION INTRAVENOUS; SUBCUTANEOUS AS NEEDED
Status: DISCONTINUED | OUTPATIENT
Start: 2022-08-19 | End: 2022-08-24

## 2022-08-19 RX ORDER — HEPARIN SODIUM 10000 [USP'U]/100ML
18-36 INJECTION, SOLUTION INTRAVENOUS
Status: DISCONTINUED | OUTPATIENT
Start: 2022-08-19 | End: 2022-08-24

## 2022-08-19 RX ORDER — DIGOXIN 250 MCG
0.25 TABLET ORAL DAILY
Status: DISCONTINUED | OUTPATIENT
Start: 2022-08-20 | End: 2022-08-26 | Stop reason: HOSPADM

## 2022-08-19 RX ORDER — DIGOXIN 0.25 MG/ML
250 INJECTION INTRAMUSCULAR; INTRAVENOUS EVERY 4 HOURS
Status: DISPENSED | OUTPATIENT
Start: 2022-08-19 | End: 2022-08-19

## 2022-08-19 RX ORDER — HEPARIN SODIUM 1000 [USP'U]/ML
10000 INJECTION, SOLUTION INTRAVENOUS; SUBCUTANEOUS AS NEEDED
Status: DISCONTINUED | OUTPATIENT
Start: 2022-08-19 | End: 2022-08-24

## 2022-08-19 RX ORDER — SODIUM CHLORIDE 9 MG/ML
125 INJECTION, SOLUTION INTRAVENOUS CONTINUOUS
Status: DISPENSED | OUTPATIENT
Start: 2022-08-19 | End: 2022-08-19

## 2022-08-19 RX ADMIN — HEPARIN SODIUM 15 UNITS/KG/HR: 10000 INJECTION, SOLUTION INTRAVENOUS at 12:37

## 2022-08-19 RX ADMIN — PIPERACILLIN AND TAZOBACTAM 3.38 G: 3; .375 INJECTION, POWDER, LYOPHILIZED, FOR SOLUTION INTRAVENOUS at 06:53

## 2022-08-19 RX ADMIN — LEVOFLOXACIN 750 MG: 500 TABLET, FILM COATED ORAL at 22:32

## 2022-08-19 RX ADMIN — PIPERACILLIN AND TAZOBACTAM 3.38 G: 3; .375 INJECTION, POWDER, LYOPHILIZED, FOR SOLUTION INTRAVENOUS at 12:16

## 2022-08-19 RX ADMIN — PIPERACILLIN AND TAZOBACTAM 3.38 G: 3; .375 INJECTION, POWDER, LYOPHILIZED, FOR SOLUTION INTRAVENOUS at 20:53

## 2022-08-19 RX ADMIN — SODIUM CHLORIDE 125 ML/HR: 9 INJECTION, SOLUTION INTRAVENOUS at 04:39

## 2022-08-19 RX ADMIN — OXYCODONE 5 MG: 5 TABLET ORAL at 18:32

## 2022-08-19 RX ADMIN — OXYCODONE 5 MG: 5 TABLET ORAL at 00:48

## 2022-08-19 RX ADMIN — OXYCODONE 5 MG: 5 TABLET ORAL at 12:17

## 2022-08-19 RX ADMIN — DIGOXIN 250 MCG: 0.25 INJECTION INTRAMUSCULAR; INTRAVENOUS at 14:24

## 2022-08-19 RX ADMIN — HEPARIN SODIUM 18 UNITS/KG/HR: 10000 INJECTION, SOLUTION INTRAVENOUS at 01:44

## 2022-08-19 RX ADMIN — DILTIAZEM HYDROCHLORIDE 15 MG/HR: 5 INJECTION, SOLUTION INTRAVENOUS at 18:33

## 2022-08-19 RX ADMIN — DILTIAZEM HYDROCHLORIDE 15 MG/HR: 5 INJECTION, SOLUTION INTRAVENOUS at 01:43

## 2022-08-19 RX ADMIN — SODIUM CHLORIDE, PRESERVATIVE FREE 10 ML: 5 INJECTION INTRAVENOUS at 22:32

## 2022-08-19 RX ADMIN — SODIUM CHLORIDE 1000 ML: 9 INJECTION, SOLUTION INTRAVENOUS at 00:32

## 2022-08-19 RX ADMIN — DILTIAZEM HYDROCHLORIDE 15 MG/HR: 5 INJECTION, SOLUTION INTRAVENOUS at 12:18

## 2022-08-19 NOTE — H&P
Hospitalist History & Physical Notes. [facility]. Name : Leonor Burger. MRN number : [id number]     YOB: 1970     Subjective :   Chief Complaint : Testicular pain with swelling. Found with atrial fibrillation with RVR    Source of information : Patient not a good historian    History of present illness:   47year old morbidly obese male presents to the emergency room complaining of swelling of scrotal area with severe pain. Presented yesterday with same complaint, was placed on seymour catheter. States sill with severe pain and swelling, worsening with pain. On exam and US scrotum did not reveal any evidence of abscess. Seymour catheter with urine output, UA today with large blood and leukocyte esterase. .  On evaluation he is found to have atrial fibrillation with tachycardia. Even though not having any temperature he looks warm and flushed. Denies any chest pain, palpitations, cough or trouble breathing. Denies any headache. Complains of still having a lot of pain in the genitalia. He is started on IV diltiazem in the emergency room. But he looks volume contracted. And review of care everywhere records 8/2020 he has normal renal functions. He had a surgical excision of the fungating penile mass with incision and drainage of scrotal abscess, at that time had surgical reconstruction done. At the same time had urethral dilation. 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 Patient since then not following back with urology, not sure if he is following with anybody else. After evaluation labs were added, suggestive of elevated sed rate, C-reactive protein and procalcitonin suggestive of infectious etiology. Also elevated uric acid and increased BUN/creatinine suggest volume contraction dehydration.   Even though urine analysis has positive large blood with no RBC no evidence of rhabdomyolysis with low CK.    Past Medical History:   Diagnosis Date    DVT (deep venous thrombosis) ([facility] [address]     Hypertension 7/27/2020     Past Surgical History:   Procedure Laterality Date    HX ORTHOPAEDIC      Ankle    HX ORTHOPAEDIC      Tumors removed from left knee     HX UROLOGICAL      Penile surgery     Family History   Problem Relation Age of Onset    Hypertension Mother     Lung Disease Father       Social History     Tobacco Use    Smoking status: Never    Smokeless tobacco: Never   Substance Use Topics    Alcohol use: Not Currently       Prior to Admission medications    Medication Sig Start Date End Date Taking? Authorizing Provider   furosemide (Lasix) 20 mg tablet Take 1 Tablet by mouth daily for 5 days. 8/17/22 8/22/22  Carina Bustos MD     No Known Allergies          Review of Systems:  Constitutional: Usually appetite is good, denies weight loss but last 3 days it is poor. Eye: No recent visual disturbances, no discharge, no double vision. Ear/nose/mouth/throat : No hearing disturbance, no ear pain, no nasal congestion, no sore throat, no trouble swallowing. Respiratory : No trouble breathing, no cough, ++ shortness of breath with activity, no hemoptysis, no wheezing. Cardiovascular : No chest pain, no palpitation, no orthopnea, ++++  peripheral edema - .  Gastrointestinal : No nausea, no vomiting,  No abdominal pain. Genitourinary : As in history of present illness. .  Endocrine : No excessive thirst, No polyuria   Immunologic : No hives, urticaria, No seasonal allergies. Musculoskeletal : Does have some joint pain, but did not notice any joint swelling or effusion. Activity is limited due to severe morbid obesity  Integumentary : No rash, No pruritus,   Hematology : No petechiae, No easy bruising,  No tendency to bleed easy. Neurology : Denies change in mental status, No headache, No confusion, No numbness or tingling. Psychiatric : Feels anxious.     Vitals:   Patient Vitals for the past 12 hrs:   Temp Pulse Resp BP SpO2   08/18/22 2133 98.1 °F (36.7 °C) (!) 140 24 101/85 96 %   08/18/22 2100 -- (!) 152 17 92/78 98 %   08/18/22 2030 -- (!) 142 -- (!) 109/58 99 %   08/18/22 2000 98.9 °F (37.2 °C) (!) 149 -- 106/80 99 %   08/18/22 1933 98.6 °F (37 °C) (!) 146 20 111/89 99 %   08/18/22 1838 98.2 °F (36.8 °C) (!) 130 20 130/71 99 %   08/18/22 1749 -- (!) 136 20 113/76 100 %   08/18/22 1734 -- -- -- -- 100 %   08/18/22 1731 -- (!) 128 20 100/60 100 %   08/18/22 1712 98.2 °F (36.8 °C) (!) 145 20 (!) 138/106 100 %       Physical Exam:   General : Morbidly obese male, no acute respiratory distress but does not look comfortable. He is little anxious, warm and flushing. [place] HEENT : PERRLA, normal oral mucosa, atraumatic normocephalic, Normal ear and nose. Neck : Supple, no JVD, no masses noted, no carotid bruit. Lungs : Breath sounds with good air entry bilaterally anteriorly. Unable to tolerate him to check posteriorly no wheezes or rales, no accessory muscle use. CVS : Rhythm rate irregularly irregular with tachycardia no murmur or gallop. Abdomen :  nontender, obese. , bowel sounds active. Extremities : Severe edema of both lower extremities with skin thickening, appears to have lymphedema. Musculoskeletal : Range of motion is very limited due to morbid obesity. No joint swelling or effusion, muscle tone and power unable to evaluate. Skin : Dry, poor skin turgor. Both lower extremities with thickening of the skin  Lymphatic : No cervical lymphadenopathy. Neurological : Awake, alert, oriented to time place person. No neurological deficits. .  Psychiatric : Very anxious.       Data Review:   Recent Results (from the past 24 hour(s))   TROPONIN-HIGH SENSITIVITY    Collection Time: 08/18/22  6:30 PM   Result Value Ref Range    Troponin-High Sensitivity 7 0 - 76 ng/L   NT-PRO BNP    Collection Time: 08/18/22  6:30 PM   Result Value Ref Range    NT pro-BNP 7,702 (H) <326 pg/mL   METABOLIC PANEL, COMPREHENSIVE Collection Time: 08/18/22  7:32 PM   Result Value Ref Range    Sodium 132 (L) 136 - 145 mmol/L    Potassium 4.6 3.5 - 5.1 mmol/L    Chloride 100 97 - 108 mmol/L    CO2 22 21 - 32 mmol/L    Anion gap 10 5 - 15 mmol/L    Glucose 178 (H) 65 - 100 mg/dL    BUN 62 (H) 6 - 20 mg/dL    Creatinine 2.20 (H) 0.70 - 1.30 mg/dL    BUN/Creatinine ratio 28 (H) 12 - 20      GFR est AA 38 (L) >60 ml/min/1.73m2    GFR est non-AA 32 (L) >60 ml/min/1.73m2    Calcium 8.9 8.5 - 10.1 mg/dL    Bilirubin, total 2.8 (H) 0.2 - 1.0 mg/dL    AST (SGOT) 44 (H) 15 - 37 U/L    ALT (SGPT) 29 12 - 78 U/L    Alk. phosphatase 146 (H) 45 - 117 U/L    Protein, total 7.5 6.4 - 8.2 g/dL    Albumin 2.1 (L) 3.5 - 5.0 g/dL    Globulin 5.4 (H) 2.0 - 4.0 g/dL    A-G Ratio 0.4 (L) 1.1 - 2.2     MAGNESIUM    Collection Time: 08/18/22  7:32 PM   Result Value Ref Range    Magnesium 2.5 (H) 1.6 - 2.4 mg/dL   PROTHROMBIN TIME + INR    Collection Time: 08/18/22  7:32 PM   Result Value Ref Range    Prothrombin time 14.3 11.9 - 14.6 sec    INR 1.1 0.9 - 1.1     CBC WITH AUTOMATED DIFF    Collection Time: 08/18/22  7:32 PM   Result Value Ref Range    WBC 17.5 (H) 4.1 - 11.1 K/uL    RBC 4.08 (L) 4.10 - 5.70 M/uL    HGB 11.2 (L) 12.1 - 17.0 g/dL    HCT 35.1 (L) 36.6 - 50.3 %    MCV 86.0 80.0 - 99.0 FL    MCH 27.5 26.0 - 34.0 PG    MCHC 31.9 30.0 - 36.5 g/dL    RDW 15.5 (H) 11.5 - 14.5 %    PLATELET 568 (L) 937 - 400 K/uL    MPV 10.9 8.9 - 12.9 FL    NRBC 0.0 0.0  WBC    ABSOLUTE NRBC 0.00 0.00 - 0.01 K/uL    NEUTROPHILS 76 (H) 32 - 75 %    LYMPHOCYTES 16 12 - 49 %    MONOCYTES 7 5 - 13 %    EOSINOPHILS 0 0 - 7 %    BASOPHILS 0 0 - 1 %    OTHER CELL 1 %    IMMATURE GRANULOCYTES 0 %    ABS. NEUTROPHILS 13.3 (H) 1.8 - 8.0 K/UL    ABS. LYMPHOCYTES 2.8 0.8 - 3.5 K/UL    ABS. MONOCYTES 1.2 (H) 0.0 - 1.0 K/UL    ABS. EOSINOPHILS 0.0 0.0 - 0.4 K/UL    ABS. BASOPHILS 0.0 0.0 - 0.1 K/UL    ABS. IMM.  GRANS. 0.0 K/UL    DF Manual      RBC COMMENTS Normocytic, Normochromic TROPONIN-HIGH SENSITIVITY    Collection Time: 08/18/22  7:32 PM   Result Value Ref Range    Troponin-High Sensitivity 7 0 - 76 ng/L   URINALYSIS W/ RFLX MICROSCOPIC    Collection Time: 08/18/22  7:34 PM   Result Value Ref Range    Color Yellow/Straw      Appearance Turbid (A) Clear      Specific gravity 1.010 1.003 - 1.030      pH (UA) 6.0      Protein Negative Negative mg/dL    Glucose Negative Negative mg/dL    Ketone Negative Negative mg/dL    Bilirubin Positive (A) Negative      Blood Large (A) Negative      Urobilinogen 4.0 (H) 0.2 - 1.0 EU/dL    Nitrites Negative Negative      Leukocyte Esterase Large (A) Negative     URINE MICROSCOPIC    Collection Time: 08/18/22  7:34 PM   Result Value Ref Range    WBC 20-50 0 - 4 /hpf    RBC 0-5 0 - 5 /hpf    Bacteria 1+ (A) Negative /hpf    Mucus Trace (A) Negative /lpf       Radiologic Studies :       CXR Results  (Last 48 hours)                 08/18/22 1747  XR CHEST PORT Final result    Impression:  Limited examination with no acute infiltrate suspected. Narrative:  Medical indication: A. fib. Portable AP semiupright view of the chest obtained no prior. Patient's body   habitus limits the evaluation, the inspiration is shallow. No definite focal   infiltrate suspected. Next                     Assessment and Plan : Active Problems:    SIRS (systemic inflammatory response syndrome) (HCC) (8/18/2022)      Acute renal failure (ARF) ([address] (8/18/2022)      Testicular swelling (8/18/2022)      Acute renal failure ([address] (8/18/2022)      Atrial fibrillation with rapid ventricular response ([address] (8/18/2022)      Systemic inflammatory response syndrome: Source most likely from genitalia, but on superficial examination was not able to find. We will request urology consultation, started patient on Zosyn and Levaquin. Sepsis syndrome: We will consult infectious disease to help with management.     Atrial fibrillation with rapid ventricular rate: Started on IV diltiazem but I think we need to treat the source. He seems to be volume depleted as per the labs and also sepsis may be the reason we will continue the diltiazem. Requested cardiology consultation, discussed with Dr. Viktoria Alonso who is going to see the patient. Acute renal failure: With volume contraction, the last numbers to compare was from 2020. We will give a bolus of normal saline, and reevaluate the patient. I will continue with maintenance fluids. Testicular swelling with pain: Per exam no evidence of abscess even ultrasound, but cannot rule out deep-seated infections with a history of squamous cell carcinoma and history of reconstruction surgery. There is mention of venous thrombosis but I do not see any medications are information on this anymore. We will keep him on IV heparin as he is a very high risk for VTE and also now with atrial fibrillation. Ordered for high-dose protocol with no bolus. Anemia: Seems to be chronic disease, no recent numbers to compare. Will monitor closely    Mild thrombocytopenia: Most likely secondary to sepsis. No further interventions at this time as numbers are still favoring no evidence of bleeding    Admitted to ICU, full CODE STATUS, home medications unable to verify. Patient states he is not taking any at home. Has no advance medical directives. CC : Naeem [place]  Signed By: Charley Apley, MD     August 18, 2022      This dictation was done by dragon, computer voice recognition software. Often unanticipated grammatical, syntax, [place] phones and other interpretive errors are inadvertently transcribed. Please excuse errors that have escaped final proofreading.

## 2022-08-19 NOTE — CONSULTS
Consult Date: 8/19/2022    Consults Sepsis    Subjective  This is a 47year old male who presented to the ED 2 days ag\o with scrotal pain and swelling following a fall. He was afebrile. Urine was turbid with pyuria and bacteria. Urine culture was sent but no antibiotics prescribed. US scrotum on 8/17 showed extensive scrotal soft tissue swelling/thickening but no focal fluid collection to suggest abscess. Patient had Siddiqi placed because of voiding difficulty. He returned to the ED because worsening symptoms. He was afebrile but WBC was 17,500 with elevated ESR, CRP and procal.  Urine was turbid with pyuria and bacteriuria. CXR was unremarkable but difficult to read because of patient's body habitus and poor inspiration. Images reviewed by me. CT Abdomen has been ordered. Blood cultures sent. Urine culture pending from 8/17. Patient was started on Levaquin and Zosyn. ID has been consulted for this reason. Patient seen in the ICU. He affirms history above and reporting ongoing scrotal pain.     Past Medical History:   Diagnosis Date    DVT (deep venous thrombosis) ([address]     Hypertension 7/27/2020      Past Surgical History:   Procedure Laterality Date    HX ORTHOPAEDIC      Ankle    HX ORTHOPAEDIC      Tumors removed from left knee     HX UROLOGICAL      Penile surgery     Family History   Problem Relation Age of Onset    Hypertension Mother     Lung Disease Father       Social History     Tobacco Use    Smoking status: Never    Smokeless tobacco: Never   Substance Use Topics    Alcohol use: Not Currently       Current Facility-Administered Medications   Medication Dose Route Frequency Provider Last Rate Last Admin    piperacillin-tazobactam (ZOSYN) 3.375 g in 0.9% sodium chloride (MBP/ADV) 100 mL MBP  3.375 g IntraVENous Q8H Bess Sales MD 25 mL/hr at 08/19/22 0653 3.375 g at 08/19/22 0653    heparin 25,000 units in D5W 250 ml infusion  18-36 Units/kg/hr (Adjusted) IntraVENous TITRATE Lopez Quiros MD 24 mL/hr at 08/19/22 0144 18 Units/kg/hr at 08/19/22 0144    0.9% sodium chloride infusion  125 mL/hr IntraVENous CONTINUOUS Lopez Quiros  mL/hr at 08/19/22 0439 125 mL/hr at 08/19/22 0439    heparin (porcine) 1,000 unit/mL injection 10,000 Units  10,000 Units IntraVENous PRN Lopez Quiros MD        Or    heparin (porcine) 1,000 unit/mL injection 5,000 Units  5,000 Units IntraVENous PRN Lopez Quiros MD        oxyCODONE IR (ROXICODONE) tablet 5 mg  5 mg Oral Q4H PRN Lopez Quiros MD   5 mg at 08/19/22 0048    dilTIAZem (CARDIZEM) 125 mg in 0.9% sodium chloride 125 mL ([id number])  0-15 mg/hr IntraVENous TITRATE Ruba Rothman MD 15 mL/hr at 08/19/22 0143 15 mg/hr at 08/19/22 0143    sodium chloride (NS) flush 5-40 mL  5-40 mL IntraVENous Q8H Lopez Quiros MD   10 mL at 08/18/22 2200    sodium chloride (NS) flush 5-40 mL  5-40 mL IntraVENous PRN Lopez Quiros MD        acetaminophen (TYLENOL) tablet 650 mg  650 mg Oral Q6H PRN Lopez Quiros MD        Or    acetaminophen (TYLENOL) suppository 650 mg  650 mg Rectal Q6H PRN Lopez Quiros MD        ondansetron (ZOFRAN ODT) tablet 4 mg  4 mg Oral Q8H PRN Lopez Quiros MD        Or    ondansetron (ZOFRAN) injection 4 mg  4 mg IntraVENous Q6H PRN Lopez Quiros MD        levoFLOXacin (LEVAQUIN) tablet 750 mg  750 mg Oral Q24H Lopez Quiros MD   750 mg at 08/18/22 2155     Current Outpatient Medications   Medication Sig Dispense Refill    furosemide (Lasix) 20 mg tablet Take 1 Tablet by mouth daily for 5 days. 5 Tablet 0        Review of Systems   Constitutional:  Negative for chills and fever. HENT: Negative. Eyes: Negative. Respiratory: Negative. Cardiovascular: Negative. Gastrointestinal: Negative. Endocrine: Negative. Genitourinary:  Positive for difficulty urinating, dysuria, scrotal swelling and testicular pain. Negative for flank pain, frequency and penile discharge. Musculoskeletal: Negative. Skin: Negative. Allergic/Immunologic: Negative. Neurological: Negative. Hematological: Negative. Psychiatric/Behavioral: Negative. Objective     Vital signs for last 24 hours:  Visit Vitals  /64   Pulse (!) 147   Temp 98.5 °F (36.9 °C)   Resp 26   Ht 5' 9\" (1.753 m)   Wt (!) 500 lb (226.8 kg)   SpO2 96%   BMI 73.84 kg/m²       Intake/Output this shift:  Current Shift: No intake/output data recorded. Last 3 Shifts: 08/17 1901 - 08/19 0700  In: 1100 [I.V.:1100]  Out: 1000 [Urine:1000]    Data Review:   Recent Results (from the past 24 hour(s))   TROPONIN-HIGH SENSITIVITY    Collection Time: 08/18/22  6:30 PM   Result Value Ref Range    Troponin-High Sensitivity 7 0 - 76 ng/L   NT-PRO BNP    Collection Time: 08/18/22  6:30 PM   Result Value Ref Range    NT pro-BNP 7,702 (H) <397 pg/mL   METABOLIC PANEL, COMPREHENSIVE    Collection Time: 08/18/22  7:32 PM   Result Value Ref Range    Sodium 132 (L) 136 - 145 mmol/L    Potassium 4.6 3.5 - 5.1 mmol/L    Chloride 100 97 - 108 mmol/L    CO2 22 21 - 32 mmol/L    Anion gap 10 5 - 15 mmol/L    Glucose 178 (H) 65 - 100 mg/dL    BUN 62 (H) 6 - 20 mg/dL    Creatinine 2.20 (H) 0.70 - 1.30 mg/dL    BUN/Creatinine ratio 28 (H) 12 - 20      GFR est AA 38 (L) >60 ml/min/1.73m2    GFR est non-AA 32 (L) >60 ml/min/1.73m2    Calcium 8.9 8.5 - 10.1 mg/dL    Bilirubin, total 2.8 (H) 0.2 - 1.0 mg/dL    AST (SGOT) 44 (H) 15 - 37 U/L    ALT (SGPT) 29 12 - 78 U/L    Alk.  phosphatase 146 (H) 45 - 117 U/L    Protein, total 7.5 6.4 - 8.2 g/dL    Albumin 2.1 (L) 3.5 - 5.0 g/dL    Globulin 5.4 (H) 2.0 - 4.0 g/dL    A-G Ratio 0.4 (L) 1.1 - 2.2     MAGNESIUM    Collection Time: 08/18/22  7:32 PM   Result Value Ref Range    Magnesium 2.5 (H) 1.6 - 2.4 mg/dL   PROTHROMBIN TIME + INR    Collection Time: 08/18/22  7:32 PM   Result Value Ref Range    Prothrombin time 14.3 11.9 - 14.6 sec    INR 1.1 0.9 - 1.1     CBC WITH AUTOMATED DIFF    Collection Time: 08/18/22  7:32 PM   Result Value Ref Range    WBC 17.5 (H) 4.1 - 11.1 K/uL    RBC 4.08 (L) 4.10 - 5.70 M/uL    HGB 11.2 (L) 12.1 - 17.0 g/dL    HCT 35.1 (L) 36.6 - 50.3 %    MCV 86.0 80.0 - 99.0 FL    MCH 27.5 26.0 - 34.0 PG    MCHC 31.9 30.0 - 36.5 g/dL    RDW 15.5 (H) 11.5 - 14.5 %    PLATELET 586 (L) 400 - 400 K/uL    MPV 10.9 8.9 - 12.9 FL    NRBC 0.0 0.0  WBC    ABSOLUTE NRBC 0.00 0.00 - 0.01 K/uL    NEUTROPHILS 76 (H) 32 - 75 %    LYMPHOCYTES 16 12 - 49 %    MONOCYTES 7 5 - 13 %    EOSINOPHILS 0 0 - 7 %    BASOPHILS 0 0 - 1 %    OTHER CELL 1 %    IMMATURE GRANULOCYTES 0 %    ABS. NEUTROPHILS 13.3 (H) 1.8 - 8.0 K/UL    ABS. LYMPHOCYTES 2.8 0.8 - 3.5 K/UL    ABS. MONOCYTES 1.2 (H) 0.0 - 1.0 K/UL    ABS. EOSINOPHILS 0.0 0.0 - 0.4 K/UL    ABS. BASOPHILS 0.0 0.0 - 0.1 K/UL    ABS. IMM.  GRANS. 0.0 K/UL    DF Manual      RBC COMMENTS Normocytic, Normochromic     TROPONIN-HIGH SENSITIVITY    Collection Time: 08/18/22  7:32 PM   Result Value Ref Range    Troponin-High Sensitivity 7 0 - 76 ng/L   URIC ACID    Collection Time: 08/18/22  7:32 PM   Result Value Ref Range    Uric acid 13.3 (H) 3.5 - 7.2 mg/dL   TSH 3RD GENERATION    Collection Time: 08/18/22  7:32 PM   Result Value Ref Range    TSH 4.74 (H) 0.36 - 3.74 uIU/mL   CK    Collection Time: 08/18/22  7:32 PM   Result Value Ref Range    CK 29 (L) 39 - 308 U/L   C REACTIVE PROTEIN, QT    Collection Time: 08/18/22  7:32 PM   Result Value Ref Range    C-Reactive protein 13.60 (H) 0.00 - 0.60 mg/dL   SED RATE (ESR)    Collection Time: 08/18/22  7:32 PM   Result Value Ref Range    Sed rate, automated 94 (H) 0 - 20 mm/hr   PROCALCITONIN    Collection Time: 08/18/22  7:32 PM   Result Value Ref Range    Procalcitonin 10.44 (H) 0 ng/mL   URINALYSIS W/ RFLX MICROSCOPIC    Collection Time: 08/18/22  7:34 PM   Result Value Ref Range    Color Yellow/Straw      Appearance Turbid (A) Clear      Specific gravity 1.010 1.003 - 1.030      pH (UA) 6.0      Protein Negative Negative mg/dL    Glucose Negative Negative mg/dL    Ketone Negative Negative mg/dL    Bilirubin Positive (A) Negative      Blood Large (A) Negative      Urobilinogen 4.0 (H) 0.2 - 1.0 EU/dL    Nitrites Negative Negative      Leukocyte Esterase Large (A) Negative      WBC 20-50 0 - 4 /hpf    RBC 0-5 0 - 5 /hpf    Bacteria Negative Negative /hpf    Mucus Trace /lpf   URINE MICROSCOPIC    Collection Time: 08/18/22  7:34 PM   Result Value Ref Range    WBC 20-50 0 - 4 /hpf    RBC 0-5 0 - 5 /hpf    Bacteria 1+ (A) Negative /hpf    Mucus Trace (A) Negative /lpf   PTT    Collection Time: 08/19/22 12:43 AM   Result Value Ref Range    aPTT 29.5 21.2 - 34.1 sec    aPTT, therapeutic range   82 - 109 sec   LACTIC ACID    Collection Time: 08/19/22  4:43 AM   Result Value Ref Range    Lactic acid 1.7 0.4 - 2.0 mmol/L   METABOLIC PANEL, COMPREHENSIVE    Collection Time: 08/19/22  4:43 AM   Result Value Ref Range    Sodium 134 (L) 136 - 145 mmol/L    Potassium 3.6 3.5 - 5.1 mmol/L    Chloride 100 97 - 108 mmol/L    CO2 26 21 - 32 mmol/L    Anion gap 8 5 - 15 mmol/L    Glucose 221 (H) 65 - 100 mg/dL    BUN 54 (H) 6 - 20 mg/dL    Creatinine 1.75 (H) 0.70 - 1.30 mg/dL    BUN/Creatinine ratio 31 (H) 12 - 20      GFR est AA 50 (L) >60 ml/min/1.73m2    GFR est non-AA 41 (L) >60 ml/min/1.73m2    Calcium 8.2 (L) 8.5 - 10.1 mg/dL    Bilirubin, total 2.3 (H) 0.2 - 1.0 mg/dL    AST (SGOT) 27 15 - 37 U/L    ALT (SGPT) 26 12 - 78 U/L    Alk.  phosphatase 119 (H) 45 - 117 U/L    Protein, total 7.1 6.4 - 8.2 g/dL    Albumin 1.8 (L) 3.5 - 5.0 g/dL    Globulin 5.3 (H) 2.0 - 4.0 g/dL    A-G Ratio 0.3 (L) 1.1 - 2.2     MAGNESIUM    Collection Time: 08/19/22  4:43 AM   Result Value Ref Range    Magnesium 2.5 (H) 1.6 - 2.4 mg/dL   PHOSPHORUS    Collection Time: 08/19/22  4:43 AM   Result Value Ref Range    Phosphorus 3.9 2.6 - 4.7 mg/dL   CBC WITH AUTOMATED DIFF    Collection Time: 08/19/22 4:43 AM   Result Value Ref Range    WBC 21.5 (H) 4.1 - 11.1 K/uL    RBC 3.77 (L) 4.10 - 5.70 M/uL    HGB 10.4 (L) 12.1 - 17.0 g/dL    HCT 33.1 (L) 36.6 - 50.3 %    MCV 87.8 80.0 - 99.0 FL    MCH 27.6 26.0 - 34.0 PG    MCHC 31.4 30.0 - 36.5 g/dL    RDW 15.7 (H) 11.5 - 14.5 %    PLATELET 497 (L) 571 - 400 K/uL    MPV 11.4 8.9 - 12.9 FL    NRBC 0.0 0.0  WBC    ABSOLUTE NRBC 0.00 0.00 - 0.01 K/uL    NEUTROPHILS 75 32 - 75 %    LYMPHOCYTES 17 12 - 49 %    MONOCYTES 8 5 - 13 %    EOSINOPHILS 0 0 - 7 %    BASOPHILS 0 0 - 1 %    IMMATURE GRANULOCYTES 0 %    ABS. NEUTROPHILS 16.1 (H) 1.8 - 8.0 K/UL    ABS. LYMPHOCYTES 3.7 (H) 0.8 - 3.5 K/UL    ABS. MONOCYTES 1.7 (H) 0.0 - 1.0 K/UL    ABS. EOSINOPHILS 0.0 0.0 - 0.4 K/UL    ABS. BASOPHILS 0.0 0.0 - 0.1 K/UL    ABS. IMM. GRANS. 0.0 K/UL    DF Manual      PLATELET COMMENTS Large Platelets      RBC COMMENTS Polychromasia  1+         CXR (8/18)          Physical Exam  Vitals and nursing note reviewed. Constitutional:       Appearance: He is obese. He is ill-appearing. HENT:      Head: Normocephalic and atraumatic. Right Ear: External ear normal.      Left Ear: External ear normal.      Nose: Nose normal.      Mouth/Throat:      Pharynx: Oropharynx is clear. Eyes:      Pupils: Pupils are equal, round, and reactive to light. Cardiovascular:      Rate and Rhythm: Normal rate and regular rhythm. Heart sounds: No murmur heard. Pulmonary:      Effort: Pulmonary effort is normal.      Breath sounds: Normal breath sounds. Abdominal:      General: Bowel sounds are normal. There is no distension. Palpations: Abdomen is soft. Tenderness: There is no abdominal tenderness. There is no right CVA tenderness or left CVA tenderness. Genitourinary:     Comments: Siddiqi catheter    Severe scrotal swelling, erythema and tenderness    Suprapubic pain  Musculoskeletal:         General: Swelling present. Cervical back: Neck supple.       Right lower leg: Edema present. Left lower leg: Edema present. Skin:     Findings: No rash. Comments: Hyperkeratotic changes both distal calves likely secondary to chronic venous stasis along with erythema   Neurological:      General: No focal deficit present. Mental Status: He is alert and oriented to person, place, and time. Psychiatric:         Mood and Affect: Mood normal.         Behavior: Behavior normal.         Thought Content: Thought content normal.         Judgment: Judgment normal.     ASSESSMENT/PLAN    Sepsis with leukocytosis, elevated ESR, CRP, procal  Scrotal cellulitis, etiology unclear  UTI with pyuria and bacteria, secondary to Gram negative rods  Chronic lymphedema with venous stasis dermatitis  Morbid obesity    1. Continue IV Zosyn  2. Discontinue Levaquin  3. In am, repeat CBC, CRP, procal  4. Follow-up blood and urine cultures    Chad Fowelr MD

## 2022-08-19 NOTE — ROUTINE PROCESS
TRANSFER - OUT REPORT:    Verbal report given to Felipa RN(name) on [facility].  being transferred to [facility](unit) for routine progression of care       Report consisted of patients Situation, Background, Assessment and   Recommendations(SBAR). Information from the following report(s) SBAR was reviewed with the receiving nurse. Lines:   Peripheral IV 08/18/22 Anterior; Left Hand (Active)       Peripheral IV 08/18/22 Anterior;Right Hand (Active)       Peripheral IV 08/19/22 Right Antecubital (Active)        Opportunity for questions and clarification was provided.       Patient transported with:   [employer]

## 2022-08-19 NOTE — PROGRESS NOTES
Bedside and Verbal shift change report given to Ang Honeycutt RN (oncoming nurse) by Robert Felipe RN (offgoing nurse). Report included the following information SBAR, Kardex, Intake/Output, MAR, Recent Results, Med Rec Status, and Cardiac Rhythm AFIB .

## 2022-08-19 NOTE — PROGRESS NOTES
TRANSFER - IN REPORT:    Verbal report received from Baird, [address] [place] [address] on [facility].  being received from ED for change in patient condition(AFIB RVR cardizem drip)      Report consisted of patients Situation, Background, Assessment and   Recommendations(SBAR). Information from the following report(s) SBAR, ED Summary, Intake/Output, MAR, Recent Results, and Med Rec Status was reviewed with the receiving nurse. Opportunity for questions and clarification was provided. Assessment completed upon patients arrival to unit and care assumed.

## 2022-08-19 NOTE — PROGRESS NOTES
Admission Medication Reconciliation:    Information obtained from:  Patient    Comments/Recommendations: Reviewed PTA medications and patient's allergies. Allergies:  Patient has no known allergies. Significant PMH/Disease States:   Past Medical History:   Diagnosis Date    DVT (deep venous thrombosis) ([facility] [address]     Hypertension 7/27/2020     Chief Complaint for this Admission:    Chief Complaint   Patient presents with    Testicle Pain     Prior to Admission Medications:   Prior to Admission Medications   Prescriptions Last Dose Informant Patient Reported? Taking?   furosemide (Lasix) 20 mg tablet 8/18/2022  No Yes   Sig: Take 1 Tablet by mouth daily for 5 days.       Facility-Administered Medications: None       Monie Myers

## 2022-08-19 NOTE — CONSULTS
[facility] CARDIOLOGY  CARDIOLOGY CONSULTATION      REASON FOR CONSULT: Atrial fibrillation    REQUESTING PROVIDER: Dr. Bar Andrew:  AF/scrotal pain    HISTORY OF PRESENT ILLNESS:  Rekha Garcia is a 47y.o. year-old male with past medical history significant for morbid obesity, DVT, lymphedema, ? DM who was evaluated today due to new onset AF. Patient presented with scrotal pain and was found to be in AF. No complaints of chest pain, shortness of breath, CVA, PAD, HTN. Was found to have elevated blood sugar on admit. Records from hospital admission course thus far reviewed. Telemetry reviewed. -120. EKG shows atrial fibrillation with     INPATIENT MEDICATIONS:  Home medications reviewed. Current Facility-Administered Medications:     dilTIAZem (CARDIZEM) 125 mg in 0.9% sodium chloride 125 mL ([id number]), 0-15 mg/hr, IntraVENous, TITRATE, Sergio Madison MD, Last Rate: 15 mL/hr at 08/18/22 2054, 15 mg/hr at 08/18/22 2054    sodium chloride (NS) flush 5-40 mL, 5-40 mL, IntraVENous, Q8H, Pradeep Simon MD, 10 mL at 08/18/22 2200    sodium chloride (NS) flush 5-40 mL, 5-40 mL, IntraVENous, PRN, Pradeep Simon MD    acetaminophen (TYLENOL) tablet 650 mg, 650 mg, Oral, Q6H PRN **OR** acetaminophen (TYLENOL) suppository 650 mg, 650 mg, Rectal, Q6H PRN, Pradeep Simon MD    ondansetron (ZOFRAN ODT) tablet 4 mg, 4 mg, Oral, Q8H PRN **OR** ondansetron (ZOFRAN) injection 4 mg, 4 mg, IntraVENous, Q6H PRN, Pradeep Simon MD    [START ON 8/19/2022] enoxaparin (LOVENOX) injection 40 mg, 40 mg, SubCUTAneous, DAILY, Pradeep Simon MD    levoFLOXacin (LEVAQUIN) tablet 750 mg, 750 mg, Oral, Q24H, Pradeep Simon MD, 750 mg at 08/18/22 2155    Current Outpatient Medications:     furosemide (Lasix) 20 mg tablet, Take 1 Tablet by mouth daily for 5 days. , Disp: 5 Tablet, Rfl: 0     ALLERGIES:  Allergies reviewed with the patient,No Known Allergies . FAMILY HISTORY:  Family history reviewed. Not significant. SOCIAL HISTORY:  Notable for no  tobacco use, no heavy alcohol or illicit drug use. REVIEW OF SYSTEMS:  Complete review of systems performed, pertinents noted above, all other systems are negative. PHYSICAL EXAMINATION:  Vital sign assessment reveal a blood pressure of  110/70  and pulse rate of 110. Cardiovascular exam has a heart with a irregular rate and rhythm, normal S1 and S2. No murmur present. There are no rubs or gallops. Good peripheral pulses. No jugular venous distension. No carotid bruits are present. Respiratory exam reveals clear lung fields, no rales or rhonchi. Gastrointestinal exam has soft, nontender abdomen with normal bowel sounds. Lymphatic exam reveals chronic lymphedema and no varicosities. Chronic hyperpigmented skin changes. Neurologic exam is nonfocal.  Musculoskeletal exam is notable for a normal gait. Recent labs results and imaging reviewed. Notable findings include   Lab Results   Component Value Date/Time    WBC 17.5 (H) 08/18/2022 07:32 PM    HGB 11.2 (L) 08/18/2022 07:32 PM    HCT 35.1 (L) 08/18/2022 07:32 PM    PLATELET 433 (L) 83/94/9752 07:32 PM    MCV 86.0 08/18/2022 07:32 PM     Lab Results   Component Value Date/Time    TSH 4.74 (H) 08/18/2022 07:32 PM      Lab Results   Component Value Date/Time    CK 29 (L) 08/18/2022 07:32 PM     .       IMPRESSION AND RECOMMENDATIONS:  Atrial fibrillation with rapid rate:   Start heparin and low dose metoprolol  12.5 bid. If BP is low, can consider digoxin load and maintenance dose, 0.25 mg iv q4 h twice and then 0.25 mg po daily. Will get echo. HR up to 130 is acceptable considering ongoing sepsis. 2.  DVT: Unclear history. Will get LE venous duplex. Will need chronic AC. 3.  Super morbid obesity: Educated on diet. Thank you for involving us in the care of this patient.   Please do not hesitate to call if additional questions arise.       Noreen Reeder MD  8/18/2022

## 2022-08-19 NOTE — PROGRESS NOTES
Hospitalist Progress Note    Subjective:   Daily Progress Note: 8/19/2022 12:55 PM    Admission HPI/Hx (per Dr. Crescencio Egan):  47year old morbidly obese male presents to the emergency room complaining of swelling of scrotal area with severe pain. Presented yesterday with same complaint, was placed on seymour catheter. States sill with severe pain and swelling, worsening with pain. On exam and US scrotum did not reveal any evidence of abscess. Seymour catheter with urine output, UA today with large blood and leukocyte esterase. .  On evaluation he is found to have atrial fibrillation with tachycardia. Even though not having any temperature he looks warm and flushed. Denies any chest pain, palpitations, cough or trouble breathing. Denies any headache. Complains of still having a lot of pain in the genitalia. He is started on IV diltiazem in the emergency room. But he looks volume contracted. And review of care everywhere records 8/2020 he has normal renal functions. He had a surgical excision of the fungating penile mass with incision and drainage of scrotal abscess, at that time had surgical reconstruction done. At the same time had urethral dilation. 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 Patient since then not following back with urology, not sure if he is following with anybody else. After evaluation labs were added, suggestive of elevated sed rate, C-reactive protein and procalcitonin suggestive of infectious etiology. Also elevated uric acid and increased BUN/creatinine suggest volume contraction dehydration. Even though urine analysis has positive large blood with no RBC no evidence of rhabdomyolysis with low CK.  ---------------    Subjective: Doing ok. No active CP, SOB, Nausea. Testicular pain.     Current Facility-Administered Medications   Medication Dose Route Frequency    piperacillin-tazobactam (ZOSYN) 3.375 g in 0.9% sodium chloride (MBP/ADV) 100 mL MBP  3.375 g IntraVENous Q8H    heparin 25,000 units in D5W 250 ml infusion  18-36 Units/kg/hr (Adjusted) IntraVENous TITRATE    0.9% sodium chloride infusion  125 mL/hr IntraVENous CONTINUOUS    heparin (porcine) 1,000 unit/mL injection 10,000 Units  10,000 Units IntraVENous PRN    Or    heparin (porcine) 1,000 unit/mL injection 5,000 Units  5,000 Units IntraVENous PRN    oxyCODONE IR (ROXICODONE) tablet 5 mg  5 mg Oral Q4H PRN    dilTIAZem (CARDIZEM) 125 mg in 0.9% sodium chloride 125 mL ([id number])  0-15 mg/hr IntraVENous TITRATE    sodium chloride (NS) flush 5-40 mL  5-40 mL IntraVENous Q8H    sodium chloride (NS) flush 5-40 mL  5-40 mL IntraVENous PRN    acetaminophen (TYLENOL) tablet 650 mg  650 mg Oral Q6H PRN    Or    acetaminophen (TYLENOL) suppository 650 mg  650 mg Rectal Q6H PRN    ondansetron (ZOFRAN ODT) tablet 4 mg  4 mg Oral Q8H PRN    Or    ondansetron (ZOFRAN) injection 4 mg  4 mg IntraVENous Q6H PRN    levoFLOXacin (LEVAQUIN) tablet 750 mg  750 mg Oral Q24H     Current Outpatient Medications   Medication Sig    furosemide (Lasix) 20 mg tablet Take 1 Tablet by mouth daily for 5 days. Review of Systems    Constitutional: No - fever, chills, weight change    HEENT: No - sore throat, sinus pressure, ear pain    Respiratory: No - cough, sob, wheeze, hemoptysis, pleuritic pain    Cardiovascular: No - chest pain, palpitations, extremity edema    Gastrointestinal: No - n/v/d/bleeding/abd pain    Genitourinary: Testicular pain    Neurological: No - headache, focal weakness, LOC, paresthesia    Skin: No - new rash, discoloration    M/S: No - joint pain, back pain, injury      Objective:     Visit Vitals  /81   Pulse (!) 108   Temp 98.5 °F (36.9 °C)   Resp 22   Ht 5' 9\" (1.753 m)   Wt (!) 226.8 kg (500 lb)   SpO2 97%   BMI 73.84 kg/m²           Temp (24hrs), Av.4 °F (36.9 °C), Min:98.1 °F (36.7 °C), Max:98.9 °F (37.2 °C)      No intake/output data recorded.   1901 - 08/19 0700  In: 1100 [I.V.:1100]  Out: 1000 [Urine:1000]    PHYSICAL EXAM    Constitutional: Morbidly obese (500#)    HEENT: Poor dentition    Neck: Supple     Cardiovascular: S1S2, Irreg; Tele A Fib    Respiratory:  CTA ant bilat     GI: Soft, NT; no rigidity; + bowel sounds    : Siddiqi; pink urine; edematous scrotum    Musculoskeletal: Moving all extremities spontaneously; Neurological:  AxOx2; No new focal weakness; No tremors    Psychiatric: Appropriate mood; oriented    Skin: Thickened venous stasis dermatitis LE bilat    Vascular: Palpable pulses; + lymphedema      Data Review    Recent Results (from the past 24 hour(s))   TROPONIN-HIGH SENSITIVITY    Collection Time: 08/18/22  6:30 PM   Result Value Ref Range    Troponin-High Sensitivity 7 0 - 76 ng/L   NT-PRO BNP    Collection Time: 08/18/22  6:30 PM   Result Value Ref Range    NT pro-BNP 7,702 (H) <439 pg/mL   METABOLIC PANEL, COMPREHENSIVE    Collection Time: 08/18/22  7:32 PM   Result Value Ref Range    Sodium 132 (L) 136 - 145 mmol/L    Potassium 4.6 3.5 - 5.1 mmol/L    Chloride 100 97 - 108 mmol/L    CO2 22 21 - 32 mmol/L    Anion gap 10 5 - 15 mmol/L    Glucose 178 (H) 65 - 100 mg/dL    BUN 62 (H) 6 - 20 mg/dL    Creatinine 2.20 (H) 0.70 - 1.30 mg/dL    BUN/Creatinine ratio 28 (H) 12 - 20      GFR est AA 38 (L) >60 ml/min/1.73m2    GFR est non-AA 32 (L) >60 ml/min/1.73m2    Calcium 8.9 8.5 - 10.1 mg/dL    Bilirubin, total 2.8 (H) 0.2 - 1.0 mg/dL    AST (SGOT) 44 (H) 15 - 37 U/L    ALT (SGPT) 29 12 - 78 U/L    Alk.  phosphatase 146 (H) 45 - 117 U/L    Protein, total 7.5 6.4 - 8.2 g/dL    Albumin 2.1 (L) 3.5 - 5.0 g/dL    Globulin 5.4 (H) 2.0 - 4.0 g/dL    A-G Ratio 0.4 (L) 1.1 - 2.2     MAGNESIUM    Collection Time: 08/18/22  7:32 PM   Result Value Ref Range    Magnesium 2.5 (H) 1.6 - 2.4 mg/dL   PROTHROMBIN TIME + INR    Collection Time: 08/18/22  7:32 PM   Result Value Ref Range    Prothrombin time 14.3 11.9 - 14.6 sec    INR 1.1 0.9 - 1.1     CBC WITH AUTOMATED DIFF    Collection Time: 08/18/22  7:32 PM   Result Value Ref Range    WBC 17.5 (H) 4.1 - 11.1 K/uL    RBC 4.08 (L) 4.10 - 5.70 M/uL    HGB 11.2 (L) 12.1 - 17.0 g/dL    HCT 35.1 (L) 36.6 - 50.3 %    MCV 86.0 80.0 - 99.0 FL    MCH 27.5 26.0 - 34.0 PG    MCHC 31.9 30.0 - 36.5 g/dL    RDW 15.5 (H) 11.5 - 14.5 %    PLATELET 024 (L) 590 - 400 K/uL    MPV 10.9 8.9 - 12.9 FL    NRBC 0.0 0.0  WBC    ABSOLUTE NRBC 0.00 0.00 - 0.01 K/uL    NEUTROPHILS 76 (H) 32 - 75 %    LYMPHOCYTES 16 12 - 49 %    MONOCYTES 7 5 - 13 %    EOSINOPHILS 0 0 - 7 %    BASOPHILS 0 0 - 1 %    OTHER CELL 1 %    IMMATURE GRANULOCYTES 0 %    ABS. NEUTROPHILS 13.3 (H) 1.8 - 8.0 K/UL    ABS. LYMPHOCYTES 2.8 0.8 - 3.5 K/UL    ABS. MONOCYTES 1.2 (H) 0.0 - 1.0 K/UL    ABS. EOSINOPHILS 0.0 0.0 - 0.4 K/UL    ABS. BASOPHILS 0.0 0.0 - 0.1 K/UL    ABS. IMM.  GRANS. 0.0 K/UL    DF Manual      RBC COMMENTS Normocytic, Normochromic     TROPONIN-HIGH SENSITIVITY    Collection Time: 08/18/22  7:32 PM   Result Value Ref Range    Troponin-High Sensitivity 7 0 - 76 ng/L   URIC ACID    Collection Time: 08/18/22  7:32 PM   Result Value Ref Range    Uric acid 13.3 (H) 3.5 - 7.2 mg/dL   TSH 3RD GENERATION    Collection Time: 08/18/22  7:32 PM   Result Value Ref Range    TSH 4.74 (H) 0.36 - 3.74 uIU/mL   CK    Collection Time: 08/18/22  7:32 PM   Result Value Ref Range    CK 29 (L) 39 - 308 U/L   C REACTIVE PROTEIN, QT    Collection Time: 08/18/22  7:32 PM   Result Value Ref Range    C-Reactive protein 13.60 (H) 0.00 - 0.60 mg/dL   SED RATE (ESR)    Collection Time: 08/18/22  7:32 PM   Result Value Ref Range    Sed rate, automated 94 (H) 0 - 20 mm/hr   PROCALCITONIN    Collection Time: 08/18/22  7:32 PM   Result Value Ref Range    Procalcitonin 10.44 (H) 0 ng/mL   URINALYSIS W/ RFLX MICROSCOPIC    Collection Time: 08/18/22  7:34 PM   Result Value Ref Range    Color Yellow/Straw      Appearance Turbid (A) Clear      Specific gravity 1.010 1.003 - 1.030 pH (UA) 6.0      Protein Negative Negative mg/dL    Glucose Negative Negative mg/dL    Ketone Negative Negative mg/dL    Bilirubin Positive (A) Negative      Blood Large (A) Negative      Urobilinogen 4.0 (H) 0.2 - 1.0 EU/dL    Nitrites Negative Negative      Leukocyte Esterase Large (A) Negative      WBC 20-50 0 - 4 /hpf    RBC 0-5 0 - 5 /hpf    Bacteria Negative Negative /hpf    Mucus Trace /lpf   URINE MICROSCOPIC    Collection Time: 08/18/22  7:34 PM   Result Value Ref Range    WBC 20-50 0 - 4 /hpf    RBC 0-5 0 - 5 /hpf    Bacteria 1+ (A) Negative /hpf    Mucus Trace (A) Negative /lpf   CULTURE, BLOOD, PAIRED    Collection Time: 08/18/22  9:47 PM    Specimen: Blood   Result Value Ref Range    Special Requests: No Special Requests      Culture result: No growth after 13 hours     PTT    Collection Time: 08/19/22 12:43 AM   Result Value Ref Range    aPTT 29.5 21.2 - 34.1 sec    aPTT, therapeutic range   82 - 109 sec   LACTIC ACID    Collection Time: 08/19/22  4:43 AM   Result Value Ref Range    Lactic acid 1.7 0.4 - 2.0 mmol/L   METABOLIC PANEL, COMPREHENSIVE    Collection Time: 08/19/22  4:43 AM   Result Value Ref Range    Sodium 134 (L) 136 - 145 mmol/L    Potassium 3.6 3.5 - 5.1 mmol/L    Chloride 100 97 - 108 mmol/L    CO2 26 21 - 32 mmol/L    Anion gap 8 5 - 15 mmol/L    Glucose 221 (H) 65 - 100 mg/dL    BUN 54 (H) 6 - 20 mg/dL    Creatinine 1.75 (H) 0.70 - 1.30 mg/dL    BUN/Creatinine ratio 31 (H) 12 - 20      GFR est AA 50 (L) >60 ml/min/1.73m2    GFR est non-AA 41 (L) >60 ml/min/1.73m2    Calcium 8.2 (L) 8.5 - 10.1 mg/dL    Bilirubin, total 2.3 (H) 0.2 - 1.0 mg/dL    AST (SGOT) 27 15 - 37 U/L    ALT (SGPT) 26 12 - 78 U/L    Alk.  phosphatase 119 (H) 45 - 117 U/L    Protein, total 7.1 6.4 - 8.2 g/dL    Albumin 1.8 (L) 3.5 - 5.0 g/dL    Globulin 5.3 (H) 2.0 - 4.0 g/dL    A-G Ratio 0.3 (L) 1.1 - 2.2     MAGNESIUM    Collection Time: 08/19/22  4:43 AM   Result Value Ref Range    Magnesium 2.5 (H) 1.6 - 2.4 mg/dL   PHOSPHORUS    Collection Time: 08/19/22  4:43 AM   Result Value Ref Range    Phosphorus 3.9 2.6 - 4.7 mg/dL   CBC WITH AUTOMATED DIFF    Collection Time: 08/19/22  4:43 AM   Result Value Ref Range    WBC 21.5 (H) 4.1 - 11.1 K/uL    RBC 3.77 (L) 4.10 - 5.70 M/uL    HGB 10.4 (L) 12.1 - 17.0 g/dL    HCT 33.1 (L) 36.6 - 50.3 %    MCV 87.8 80.0 - 99.0 FL    MCH 27.6 26.0 - 34.0 PG    MCHC 31.4 30.0 - 36.5 g/dL    RDW 15.7 (H) 11.5 - 14.5 %    PLATELET 543 (L) 442 - 400 K/uL    MPV 11.4 8.9 - 12.9 FL    NRBC 0.0 0.0  WBC    ABSOLUTE NRBC 0.00 0.00 - 0.01 K/uL    NEUTROPHILS 75 32 - 75 %    LYMPHOCYTES 17 12 - 49 %    MONOCYTES 8 5 - 13 %    EOSINOPHILS 0 0 - 7 %    BASOPHILS 0 0 - 1 %    IMMATURE GRANULOCYTES 0 %    ABS. NEUTROPHILS 16.1 (H) 1.8 - 8.0 K/UL    ABS. LYMPHOCYTES 3.7 (H) 0.8 - 3.5 K/UL    ABS. MONOCYTES 1.7 (H) 0.0 - 1.0 K/UL    ABS. EOSINOPHILS 0.0 0.0 - 0.4 K/UL    ABS. BASOPHILS 0.0 0.0 - 0.1 K/UL    ABS. IMM. GRANS. 0.0 K/UL    DF Manual      PLATELET COMMENTS Large Platelets      RBC COMMENTS Polychromasia  1+       PTT    Collection Time: 08/19/22  9:06 AM   Result Value Ref Range    aPTT >153.0 (HH) 21.2 - 34.1 sec    aPTT, therapeutic range   82 - 109 sec       XR CHEST PORT   Final Result   Limited examination with no acute infiltrate suspected. Active Problems:    SIRS (systemic inflammatory response syndrome) (HCC) (8/18/2022)      Acute renal failure (ARF) ([address] (8/18/2022)      Testicular swelling (8/18/2022)      Acute renal failure ([address] (8/18/2022)      Atrial fibrillation with rapid ventricular response ([facility] [address] (8/18/2022)        Assessment/Plan:     Sepsis (clinically) with Scrotal Cellulitis ?  - ID consulted/Mello  - Francia Zosyn/Levaquin  - Urology evaluated. CT cannot be done d/t weight. Ultrasound shows no abscess. A Fib RVR  - Cardizem gtt  - Hep gtt  - Cadrdiology/Dr. Stack Drafts on consult    OSIEL; improving  - Received IVF    Elevated BNP; D/t RVR?  Echo pending    Chronic Venous Stasis  - Venous Duplex    Anemia with mild thrombocytopenia    Severe Morbid Obesity      DVT Prophylaxis: On AC  Code Status: Full Code    Care Plan discussed with:   ________________________________________  Luis Fernando Davis MD

## 2022-08-19 NOTE — PROGRESS NOTES
Reason for Admission:  AFIB                     RUR Score:    15%                 Plan for utilizing home health:  Not at this time        PCP: First and Last name:  Abhilash Ramirez,      Name of Practice:    Are you a current patient: Yes/No:    Approximate date of last visit: \"Been awhile\"    Can you participate in a virtual visit with your PCP:                     Current Advanced Directive/Advance Care Plan: Full Code      Healthcare Decision Maker:   Click here to complete [address] including selection of the Healthcare Decision Maker Relationship (ie \"Primary\")    Kyung Cardona, sister, [phone]                         Transition of Care Plan:     Met f/f with Pt, he confirmed that the information on face sheet is correct. Pt stated that he lives with his mother. It appears that Pt has a good support system, he said that he has three grown Nieces, an Aunt and sister who will come and assist him when he needs it. Pt stated no HH, he has a cane and was independent with ADL until he came to the hospital about a week ago. Pt stated that he needs help getting his pants and socks on. Pt stated that he has been staying in bed because he gets so short of breath. Pt stated that his sister will come over and help him. Pt stated that his sister will take him to doctors appointments. Pt stated that he does not drive. Pt stated that  his sister can take him home when he is D/C if they can get him in the car here. Pt stated that  he will call the fire department when he gets home to get him in the house. Pt stated that he uses [facility]. PT STATED THAT HE COULD USE A WHEELCHAIR TO GO FROM THE HOUSE TO THE CAR. PT STATED THAT HIS NIECES WILL COME AND ASSIST HIM WITH GETTING TO THE CAR. PT ALSO STATED THAT HE NEEDS A ROLLING WALKER AND SHOWER BARS. Pt stated that he sleeps on an adjustable bed at home. CM dispo: Home, needs TBD.

## 2022-08-19 NOTE — PROGRESS NOTES
[facility] CARDIOLOGY  CARDIOLOGY PROGRESS NOTE    HISTORY OF PRESENT ILLNESS:  Rola Toth is a 47y.o. year-old male with past medical history significant for morbid obesity, DVT, lymphedema, ? DM who was evaluated today due to new onset AF. Patient presented with scrotal pain and was found to be in AF. No complaints of chest pain, shortness of breath, CVA, PAD, HTN. Was found to have elevated blood sugar on admit. 8/19: Patient was seen and examined in the ER. Complaining of scrotal pain. Denies chest pain or discomfort. Records from hospital admission course thus far reviewed. Telemetry reviewed. AF 120s   EKG shows atrial fibrillation with     INPATIENT MEDICATIONS:  Home medications reviewed.     Current Facility-Administered Medications:     piperacillin-tazobactam (ZOSYN) 3.375 g in 0.9% sodium chloride (MBP/ADV) 100 mL MBP, 3.375 g, IntraVENous, Q8H, Kassie Alcala MD, Last Rate: 25 mL/hr at 08/19/22 1216, 3.375 g at 08/19/22 1216    heparin 25,000 units in D5W 250 ml infusion, 18-36 Units/kg/hr (Adjusted), IntraVENous, TITRATE, Kassie Alcala MD, Last Rate: 20 mL/hr at 08/19/22 1237, 15 Units/kg/hr at 08/19/22 1237    0.9% sodium chloride infusion, 125 mL/hr, IntraVENous, CONTINUOUS, Kassie Alcala MD, Last Rate: 125 mL/hr at 08/19/22 0439, 125 mL/hr at 08/19/22 0439    heparin (porcine) 1,000 unit/mL injection 10,000 Units, 10,000 Units, IntraVENous, PRN **OR** heparin (porcine) 1,000 unit/mL injection 5,000 Units, 5,000 Units, IntraVENous, PRN, Kassie Alcala MD    oxyCODONE IR (ROXICODONE) tablet 5 mg, 5 mg, Oral, Q4H PRN, Kassie Alcala MD, 5 mg at 08/19/22 1217    dilTIAZem (CARDIZEM) 125 mg in 0.9% sodium chloride 125 mL ([id number]), 0-15 mg/hr, IntraVENous, TITRATE, Kandi Forbes MD, Last Rate: 15 mL/hr at 08/19/22 1218, 15 mg/hr at 08/19/22 1218    sodium chloride (NS) flush 5-40 mL, 5-40 mL, IntraVENous, Q8H, Deepti, Luanne Galvez MD, 10 mL at 08/18/22 2200    sodium chloride (NS) flush 5-40 mL, 5-40 mL, IntraVENous, PRN, Anmol Gabriel MD    acetaminophen (TYLENOL) tablet 650 mg, 650 mg, Oral, Q6H PRN **OR** acetaminophen (TYLENOL) suppository 650 mg, 650 mg, Rectal, Q6H PRN, Anmol Gabriel MD    ondansetron (ZOFRAN ODT) tablet 4 mg, 4 mg, Oral, Q8H PRN **OR** ondansetron (ZOFRAN) injection 4 mg, 4 mg, IntraVENous, Q6H PRN, Anmol Gabriel MD    levoFLOXacin (LEVAQUIN) tablet 750 mg, 750 mg, Oral, Q24H, Anmol Gabriel MD, 750 mg at 08/18/22 2155    Current Outpatient Medications:     furosemide (Lasix) 20 mg tablet, Take 1 Tablet by mouth daily for 5 days. , Disp: 5 Tablet, Rfl: 0     ALLERGIES:  Allergies reviewed with the patient,No Known Allergies . FAMILY HISTORY:  Family history reviewed. Not significant. SOCIAL HISTORY:  Notable for no  tobacco use, no heavy alcohol or illicit drug use. REVIEW OF SYSTEMS:  Complete review of systems performed, pertinents noted above, all other systems are negative. PHYSICAL EXAMINATION:  Vital sign assessment reveal a blood pressure of  106/64  and pulse rate of 120s. Cardiovascular exam has a heart with a irregular rate and rhythm, normal S1 and S2. No murmur present. There are no rubs or gallops. Good peripheral pulses. No jugular venous distension. No carotid bruits are present. Respiratory exam reveals clear lung fields, no rales or rhonchi. Gastrointestinal exam has soft, nontender abdomen with normal bowel sounds. Lymphatic exam reveals chronic lymphedema and no varicosities. Chronic hyperpigmented skin changes. Neurologic exam is nonfocal.  Musculoskeletal exam is notable for a normal gait. Recent labs results and imaging reviewed.   Notable findings include   Lab Results   Component Value Date/Time    WBC 21.5 (H) 08/19/2022 04:43 AM    HGB 10.4 (L) 08/19/2022 04:43 AM    HCT 33.1 (L) 08/19/2022 04:43 AM    PLATELET 130 (L) 08/19/2022 04:43 AM    MCV 87.8 08/19/2022 04:43 AM     Lab Results   Component Value Date/Time    TSH 4.74 (H) 08/18/2022 07:32 PM      Lab Results   Component Value Date/Time    CK 29 (L) 08/18/2022 07:32 PM     .       Case was discussed with Dr. Ivett Timmons and our impression and recommendations are as follows:      1 Atrial fibrillation with rapid rate:   Continue  heparin and low dose metoprolol 12.5 bid. Will give digoxin load and maintenance dose, 0.25 mg iv q4 h twice and then 0.25 mg po daily. Echo pending. HR up to 130 is acceptable considering ongoing sepsis. 2.  DVT: Chronic lymphedema per patient. Unclear history. Will get LE venous duplex. Will need chronic AC. 3.  Super morbid obesity: Educated on diet and lifestyle modifications. Thank you for involving us in the care of this patient. Please do not hesitate to call if additional questions arise. Coleen Zuluaga NP  8/19/2022   Patient seen and examined. Agree with findings and plan of care per MEENA Duggan. Dr. [facility]  [address] [place] [address].    [address] [place] [address], [address] [place] [address]  [phone]

## 2022-08-19 NOTE — CONSULTS
UROLOGY CONSULT NOTE  [phone]        Patient: Ching Gilmore. MRN: [id number]  SSN: [id number]    YOB: 1970  Age: 47 y.o. Sex: male      REFERRING PROVIDER:  Sherlie Cockayne  Subjective:      Ching Alfaro is a 47 y.o. male who is being seen in urologic consultation for scrotal swelling and discomfort. He is seen in the ICU  He reports that approximately 1 week ago he became ill (chills, shakes, diaphoresis and overall fatigue) and fell at home. He had to have EMS assist him up. At that time he developed scrotal pain and discomfort and thought he had fallen or \"sat on it wrong. \"      The pain worsened in intensity. He describes a diffuse soreness. On Wednesday, 8/17/22 he reports that he came into the emergency room with complaints of urinary retention. His scrotum and penis had begun to swell. He felt he was not emptying his bladder. Siddiqi catheter was inserted in the ER and he was discharged home on Ceftin and Ciprofloxacin. He presented to the ER again on 8/18/2022 with ongoing and worsening scrotal pain and discomfort. Scrotal ultrasound shows extensive soft tissue swelling and wall thickening, no fluid collection or evidence of acute abscess, and a small left hydrocele. No evidence of epididymitis/ orchitis or abscess. White blood count elevated. Afebrile. On Zosyn and Levaquin. Blood and urine cultures are pending. OSIEL. Atrial fibrillation with tachycardia. Heart rate is elevated to 150 at times. BP stable. He is on a diltiazem infusion. He has a reported history of DVT and is currently on a heparin infusion. He previously saw Dr. Thi Gutierres at [facility]. He was last seen there in 2020. He reports that he has not seen a urologist since that time. He was treated for a Buschke Jorge lesion. He is status post excision, liberation of a buried penis, phalloplasty, and urethral dilation.   He was found to have squamous cell carcinoma on associated pathology.         Past Medical History:   Diagnosis Date    DVT (deep venous thrombosis) ([facility] [address]     Hypertension 7/27/2020     Past Surgical History:   Procedure Laterality Date    HX ORTHOPAEDIC      Ankle    HX ORTHOPAEDIC      Tumors removed from left knee     HX UROLOGICAL      Penile surgery      Family History   Problem Relation Age of Onset    Hypertension Mother     Lung Disease Father      Social History     Tobacco Use    Smoking status: Never    Smokeless tobacco: Never   Substance Use Topics    Alcohol use: Not Currently      Current Facility-Administered Medications   Medication Dose Route Frequency Provider Last Rate Last Admin    piperacillin-tazobactam (ZOSYN) 3.375 g in 0.9% sodium chloride (MBP/ADV) 100 mL MBP  3.375 g IntraVENous Q8H Kesha Strauss MD 25 mL/hr at 08/19/22 0653 3.375 g at 08/19/22 0653    heparin 25,000 units in D5W 250 ml infusion  18-36 Units/kg/hr (Adjusted) IntraVENous TITRATE Kesha Strauss MD 24 mL/hr at 08/19/22 0144 18 Units/kg/hr at 08/19/22 0144    0.9% sodium chloride infusion  125 mL/hr IntraVENous CONTINUOUS Kesha Strauss  mL/hr at 08/19/22 0439 125 mL/hr at 08/19/22 0439    heparin (porcine) 1,000 unit/mL injection 10,000 Units  10,000 Units IntraVENous PRN Kesha Strauss MD        Or    heparin (porcine) 1,000 unit/mL injection 5,000 Units  5,000 Units IntraVENous PRN Kesha Strauss MD        oxyCODONE IR (ROXICODONE) tablet 5 mg  5 mg Oral Q4H PRN Kesha Strauss MD   5 mg at 08/19/22 0048    dilTIAZem (CARDIZEM) 125 mg in 0.9% sodium chloride 125 mL ([id number])  0-15 mg/hr IntraVENous TITRATE Jung Bui MD 15 mL/hr at 08/19/22 0143 15 mg/hr at 08/19/22 0143    sodium chloride (NS) flush 5-40 mL  5-40 mL IntraVENous Q8H Kesha Strauss MD   10 mL at 08/18/22 2200    sodium chloride (NS) flush 5-40 mL  5-40 mL IntraVENous PRN Kesha Strauss MD        acetaminophen (TYLENOL) tablet 650 mg  650 mg Oral Q6H PRN Kesha Strauss MD        Or    acetaminophen (TYLENOL) suppository 650 mg  650 mg Rectal Q6H PRN Kesha Strauss MD        ondansetron (ZOFRAN ODT) tablet 4 mg  4 mg Oral Q8H PRN Kesha Strauss MD        Or    ondansetron (ZOFRAN) injection 4 mg  4 mg IntraVENous Q6H PRN Kesha Strauss MD        levoFLOXacin (LEVAQUIN) tablet 750 mg  750 mg Oral Q24H Kesha Strauss MD   750 mg at 08/18/22 2156     Current Outpatient Medications   Medication Sig Dispense Refill    furosemide (Lasix) 20 mg tablet Take 1 Tablet by mouth daily for 5 days. 5 Tablet 0        No Known Allergies    Review of Systems:  Review of Systems   Constitutional:  Positive for malaise/fatigue. Negative for chills, diaphoresis, fever and weight loss. HENT: Negative. Eyes: Negative. Respiratory:  Negative for cough, hemoptysis, sputum production and shortness of breath. No further SOB     Cardiovascular:  Positive for leg swelling. Negative for chest pain and palpitations. Gastrointestinal:  Negative for nausea and vomiting. Genitourinary:  Positive for hematuria. Negative for dysuria, flank pain, frequency and urgency. Musculoskeletal:  Positive for back pain. Skin:  Negative for rash. Neurological:  Positive for dizziness and weakness.      Objective:     Vitals:    08/19/22 0102 08/19/22 0202 08/19/22 0232 08/19/22 0332   BP: 121/72 112/76 116/82 106/64   Pulse: (!) 133 (!) 149 (!) 137 (!) 147   Resp: 27 28 26 26   Temp:       SpO2: 96% 98% 97% 96%   Weight:       Height:            Recent Labs     08/19/22 0443 08/18/22 1932   WBC 21.5* 17.5*   HGB 10.4* 11.2*   HCT 33.1* 35.1*   * 117*     Recent Labs     08/19/22 0443 08/18/22 1932   * 132*   K 3.6 4.6    100   CO2 26 22   * 178*   BUN 54* 62*   CREA 1.75* 2.20*   CA 8.2* 8.9   MG 2.5* 2.5*   PHOS 3.9  --    ALB 1.8* 2.1*   TBILI 2.3* 2.8*   ALT 26 29   INR  --  1.1     No results for input(s): PH, PCO2, PO2, HCO3, FIO2 in the last 72 hours. 24 Hour Results:  Recent Results (from the past 24 hour(s))   TROPONIN-HIGH SENSITIVITY    Collection Time: 08/18/22  6:30 PM   Result Value Ref Range    Troponin-High Sensitivity 7 0 - 76 ng/L   NT-PRO BNP    Collection Time: 08/18/22  6:30 PM   Result Value Ref Range    NT pro-BNP 7,702 (H) <301 pg/mL   METABOLIC PANEL, COMPREHENSIVE    Collection Time: 08/18/22  7:32 PM   Result Value Ref Range    Sodium 132 (L) 136 - 145 mmol/L    Potassium 4.6 3.5 - 5.1 mmol/L    Chloride 100 97 - 108 mmol/L    CO2 22 21 - 32 mmol/L    Anion gap 10 5 - 15 mmol/L    Glucose 178 (H) 65 - 100 mg/dL    BUN 62 (H) 6 - 20 mg/dL    Creatinine 2.20 (H) 0.70 - 1.30 mg/dL    BUN/Creatinine ratio 28 (H) 12 - 20      GFR est AA 38 (L) >60 ml/min/1.73m2    GFR est non-AA 32 (L) >60 ml/min/1.73m2    Calcium 8.9 8.5 - 10.1 mg/dL    Bilirubin, total 2.8 (H) 0.2 - 1.0 mg/dL    AST (SGOT) 44 (H) 15 - 37 U/L    ALT (SGPT) 29 12 - 78 U/L    Alk.  phosphatase 146 (H) 45 - 117 U/L    Protein, total 7.5 6.4 - 8.2 g/dL    Albumin 2.1 (L) 3.5 - 5.0 g/dL    Globulin 5.4 (H) 2.0 - 4.0 g/dL    A-G Ratio 0.4 (L) 1.1 - 2.2     MAGNESIUM    Collection Time: 08/18/22  7:32 PM   Result Value Ref Range    Magnesium 2.5 (H) 1.6 - 2.4 mg/dL   PROTHROMBIN TIME + INR    Collection Time: 08/18/22  7:32 PM   Result Value Ref Range    Prothrombin time 14.3 11.9 - 14.6 sec    INR 1.1 0.9 - 1.1     CBC WITH AUTOMATED DIFF    Collection Time: 08/18/22  7:32 PM   Result Value Ref Range    WBC 17.5 (H) 4.1 - 11.1 K/uL    RBC 4.08 (L) 4.10 - 5.70 M/uL    HGB 11.2 (L) 12.1 - 17.0 g/dL    HCT 35.1 (L) 36.6 - 50.3 %    MCV 86.0 80.0 - 99.0 FL    MCH 27.5 26.0 - 34.0 PG    MCHC 31.9 30.0 - 36.5 g/dL    RDW 15.5 (H) 11.5 - 14.5 %    PLATELET 198 (L) 321 - 400 K/uL    MPV 10.9 8.9 - 12.9 FL    NRBC 0.0 0.0  WBC    ABSOLUTE NRBC 0.00 0.00 - 0.01 K/uL    NEUTROPHILS 76 (H) 32 - 75 %    LYMPHOCYTES 16 12 - 49 %    MONOCYTES 7 5 - 13 %    EOSINOPHILS 0 0 - 7 %    BASOPHILS 0 0 - 1 %    OTHER CELL 1 %    IMMATURE GRANULOCYTES 0 %    ABS. NEUTROPHILS 13.3 (H) 1.8 - 8.0 K/UL    ABS. LYMPHOCYTES 2.8 0.8 - 3.5 K/UL    ABS. MONOCYTES 1.2 (H) 0.0 - 1.0 K/UL    ABS. EOSINOPHILS 0.0 0.0 - 0.4 K/UL    ABS. BASOPHILS 0.0 0.0 - 0.1 K/UL    ABS. IMM.  GRANS. 0.0 K/UL    DF Manual      RBC COMMENTS Normocytic, Normochromic     TROPONIN-HIGH SENSITIVITY    Collection Time: 08/18/22  7:32 PM   Result Value Ref Range    Troponin-High Sensitivity 7 0 - 76 ng/L   URIC ACID    Collection Time: 08/18/22  7:32 PM   Result Value Ref Range    Uric acid 13.3 (H) 3.5 - 7.2 mg/dL   TSH 3RD GENERATION    Collection Time: 08/18/22  7:32 PM   Result Value Ref Range    TSH 4.74 (H) 0.36 - 3.74 uIU/mL   CK    Collection Time: 08/18/22  7:32 PM   Result Value Ref Range    CK 29 (L) 39 - 308 U/L   C REACTIVE PROTEIN, QT    Collection Time: 08/18/22  7:32 PM   Result Value Ref Range    C-Reactive protein 13.60 (H) 0.00 - 0.60 mg/dL   SED RATE (ESR)    Collection Time: 08/18/22  7:32 PM   Result Value Ref Range    Sed rate, automated 94 (H) 0 - 20 mm/hr   PROCALCITONIN    Collection Time: 08/18/22  7:32 PM   Result Value Ref Range    Procalcitonin 10.44 (H) 0 ng/mL   URINALYSIS W/ RFLX MICROSCOPIC    Collection Time: 08/18/22  7:34 PM   Result Value Ref Range    Color Yellow/Straw      Appearance Turbid (A) Clear      Specific gravity 1.010 1.003 - 1.030      pH (UA) 6.0      Protein Negative Negative mg/dL    Glucose Negative Negative mg/dL    Ketone Negative Negative mg/dL    Bilirubin Positive (A) Negative      Blood Large (A) Negative      Urobilinogen 4.0 (H) 0.2 - 1.0 EU/dL    Nitrites Negative Negative      Leukocyte Esterase Large (A) Negative      WBC 20-50 0 - 4 /hpf    RBC 0-5 0 - 5 /hpf    Bacteria Negative Negative /hpf    Mucus Trace /lpf   URINE MICROSCOPIC    Collection Time: 08/18/22  7:34 PM   Result Value Ref Range    WBC 20-50 0 - 4 /hpf    RBC 0-5 0 - 5 /hpf    Bacteria 1+ (A) Negative /hpf    Mucus Trace (A) Negative /lpf   PTT    Collection Time: 08/19/22 12:43 AM   Result Value Ref Range    aPTT 29.5 21.2 - 34.1 sec    aPTT, therapeutic range   82 - 109 sec   LACTIC ACID    Collection Time: 08/19/22  4:43 AM   Result Value Ref Range    Lactic acid 1.7 0.4 - 2.0 mmol/L   METABOLIC PANEL, COMPREHENSIVE    Collection Time: 08/19/22  4:43 AM   Result Value Ref Range    Sodium 134 (L) 136 - 145 mmol/L    Potassium 3.6 3.5 - 5.1 mmol/L    Chloride 100 97 - 108 mmol/L    CO2 26 21 - 32 mmol/L    Anion gap 8 5 - 15 mmol/L    Glucose 221 (H) 65 - 100 mg/dL    BUN 54 (H) 6 - 20 mg/dL    Creatinine 1.75 (H) 0.70 - 1.30 mg/dL    BUN/Creatinine ratio 31 (H) 12 - 20      GFR est AA 50 (L) >60 ml/min/1.73m2    GFR est non-AA 41 (L) >60 ml/min/1.73m2    Calcium 8.2 (L) 8.5 - 10.1 mg/dL    Bilirubin, total 2.3 (H) 0.2 - 1.0 mg/dL    AST (SGOT) 27 15 - 37 U/L    ALT (SGPT) 26 12 - 78 U/L    Alk.  phosphatase 119 (H) 45 - 117 U/L    Protein, total 7.1 6.4 - 8.2 g/dL    Albumin 1.8 (L) 3.5 - 5.0 g/dL    Globulin 5.3 (H) 2.0 - 4.0 g/dL    A-G Ratio 0.3 (L) 1.1 - 2.2     MAGNESIUM    Collection Time: 08/19/22  4:43 AM   Result Value Ref Range    Magnesium 2.5 (H) 1.6 - 2.4 mg/dL   PHOSPHORUS    Collection Time: 08/19/22  4:43 AM   Result Value Ref Range    Phosphorus 3.9 2.6 - 4.7 mg/dL   CBC WITH AUTOMATED DIFF    Collection Time: 08/19/22  4:43 AM   Result Value Ref Range    WBC 21.5 (H) 4.1 - 11.1 K/uL    RBC 3.77 (L) 4.10 - 5.70 M/uL    HGB 10.4 (L) 12.1 - 17.0 g/dL    HCT 33.1 (L) 36.6 - 50.3 %    MCV 87.8 80.0 - 99.0 FL    MCH 27.6 26.0 - 34.0 PG    MCHC 31.4 30.0 - 36.5 g/dL    RDW 15.7 (H) 11.5 - 14.5 %    PLATELET 102 (L) 398 - 400 K/uL    MPV 11.4 8.9 - 12.9 FL    NRBC 0.0 0.0  WBC    ABSOLUTE NRBC 0.00 0.00 - 0.01 K/uL    NEUTROPHILS 75 32 - 75 %    LYMPHOCYTES 17 12 - 49 %    MONOCYTES 8 5 - 13 %    EOSINOPHILS 0 0 - 7 %    BASOPHILS 0 0 - 1 % IMMATURE GRANULOCYTES 0 %    ABS. NEUTROPHILS 16.1 (H) 1.8 - 8.0 K/UL    ABS. LYMPHOCYTES 3.7 (H) 0.8 - 3.5 K/UL    ABS. MONOCYTES 1.7 (H) 0.0 - 1.0 K/UL    ABS. EOSINOPHILS 0.0 0.0 - 0.4 K/UL    ABS. BASOPHILS 0.0 0.0 - 0.1 K/UL    ABS. IMM. GRANS. 0.0 K/UL    DF Manual      PLATELET COMMENTS Large Platelets      RBC COMMENTS Polychromasia  1+           Physical Exam:  Physical Exam  Constitutional:       Appearance: He is obese. He is ill-appearing. He is not diaphoretic. Comments: BMI greater than 70. Approximate weight 226.8 kg  Flushed. HENT:      Head: Atraumatic. Mouth/Throat:      Mouth: Mucous membranes are dry. Eyes:      Extraocular Movements: Extraocular movements intact. Cardiovascular:      Rate and Rhythm: Tachycardia present. Rhythm irregular. Comments: Atrial fibrillation with a rate 120-150. Pulmonary:      Effort: Pulmonary effort is normal.   Genitourinary:     Comments: Siddiqi catheter draining dark yellow urine with mild hematuria. Blood at the meatus. Scrotum is erythematous with diffuse anterior induration and edema. Skin folds also are noted to be less erythematous in the groin. There are no obvious areas of skin breakdown.  + odor to skin  Musculoskeletal:      Cervical back: Normal range of motion. Comments: Mobility issues at baseline secondary to body habitus. Skin:     General: Skin is warm and dry. Findings: No rash. Neurological:      Mental Status: He is alert and oriented to person, place, and time.    Psychiatric:         Behavior: Behavior normal.         Assessment:     Hospital Problems  Date Reviewed: 8/18/2022            Codes Class Noted POA    SIRS (systemic inflammatory response syndrome) (HCC) ICD-10-CM: R65.10  ICD-9-CM: 995.90  8/18/2022 Yes        Acute renal failure (ARF) ([facility] [address] ICD-10-CM: N17.9  ICD-9-CM: 584.9  8/18/2022 Yes        Testicular swelling ICD-10-CM: N50.89  ICD-9-CM: 608.86  8/18/2022 Yes        Acute renal failure [facility]) ICD-10-CM: N17.9  ICD-9-CM: 584.9  8/18/2022 Unknown        Atrial fibrillation with rapid ventricular response [facility]) ICD-10-CM: I48.91  ICD-9-CM: 427.31  8/18/2022 Unknown           Plan:     Hospital Problems  Date Reviewed: 8/18/2022            Codes Class Noted POA    SIRS (systemic inflammatory response syndrome) ([facility] [address] ICD-10-CM: R65.10  ICD-9-CM: 995.90  8/18/2022 Yes        Acute renal failure (ARF) (HCC) ICD-10-CM: N17.9  ICD-9-CM: 584.9  8/18/2022 Yes        Testicular swelling ICD-10-CM: N50.89  ICD-9-CM: 608.86  8/18/2022 Yes        Acute renal failure (HCC) ICD-10-CM: N17.9  ICD-9-CM: 584.9  8/18/2022 Unknown        Atrial fibrillation with rapid ventricular response ([facility] ICD-10-CM: I48.91  ICD-9-CM: 427.31  8/18/2022 Unknown         SUPERMORBID OBESITY BMI 73.84. SCROTAL PAIN AND SWELLING: Ultrasound with extensive soft tissue swelling and wall thickening. Probable cellulitis, no evidence of epidydimitis or orchitis. No abscess. Would like to image further with CT scan abdomen pelvis; but too big for scanner. Hematuria associated with catheter placement, more at the meatus. On heparin. Resolved. URINARY RETENTION: Penile edema, scrotal edema, now with Siddiqi catheter in place. Continue Siddiqi catheter. 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 He has not had follow-up since 2020. No visible recurrence. LEUKOCYTOSIS: SIRS, afebrile. On Levaquin and Zosyn at this time. Blood and urine cultures pending. He was on Ciprofloxacin and Ceftin prior to admission. OSIEL: Acute kidney injury. Likely multifactorial.  Now with Siddiqi catheter. Continue to monitor renal function. ATRIAL FIBRILLATION: Tachycardia present. He is on a diltiazem infusion. Heart rate remains 120-150. BP stable. HX OF DVT: Reported history of DVT. He is now on a heparin infusion.   We will need to consider this if the need for surgical intervention were to arise. I personally had a face to face encounter with the patient and performed the history, physical, assessment and plan.      Adan Villasenor MD    Signed By: Artur Briscoe NP     August 19, 2022

## 2022-08-20 LAB
ACC. NO. FROM MICRO ORDER, ACCP: ABNORMAL
ACINETOBACTER CALCOACETICUS-BAUMANII COMPLEX, ACBCX: NOT DETECTED
ALBUMIN SERPL-MCNC: 2 G/DL (ref 3.5–5)
ALBUMIN/GLOB SERPL: 0.5 {RATIO} (ref 1.1–2.2)
ALP SERPL-CCNC: 160 U/L (ref 45–117)
ALT SERPL-CCNC: 34 U/L (ref 12–78)
ANION GAP SERPL CALC-SCNC: 7 MMOL/L (ref 5–15)
APTT PPP: 33.3 SEC (ref 21.2–34.1)
APTT PPP: 35.9 SEC (ref 21.2–34.1)
APTT PPP: 36.4 SEC (ref 21.2–34.1)
AST SERPL W P-5'-P-CCNC: 37 U/L (ref 15–37)
ATRIAL RATE: 178 BPM
BACTEROIDES FRAGILIS, BFRA: NOT DETECTED
BASOPHILS # BLD: 0 K/UL (ref 0–0.1)
BASOPHILS NFR BLD: 0 % (ref 0–1)
BILIRUB SERPL-MCNC: 2.1 MG/DL (ref 0.2–1)
BIOFIRE COMMENT, BCIDPF: ABNORMAL
BUN SERPL-MCNC: 33 MG/DL (ref 6–20)
BUN/CREAT SERPL: 25 (ref 12–20)
C GLABRATA DNA VAG QL NAA+PROBE: NOT DETECTED
CA-I BLD-MCNC: 7.5 MG/DL (ref 8.5–10.1)
CALCULATED R AXIS, ECG10: 26 DEGREES
CALCULATED T AXIS, ECG11: -136 DEGREES
CANDIDA ALBICANS: NOT DETECTED
CANDIDA AURIS, CAAU: NOT DETECTED
CANDIDA KRUSEI, CKRP: NOT DETECTED
CANDIDA PARAPSILOSIS, CPAUP: NOT DETECTED
CANDIDA TROPICALIS, CTROP: NOT DETECTED
CHLORIDE SERPL-SCNC: 106 MMOL/L (ref 97–108)
CO2 SERPL-SCNC: 25 MMOL/L (ref 21–32)
CREAT SERPL-MCNC: 1.31 MG/DL (ref 0.7–1.3)
CRP SERPL-MCNC: 7.73 MG/DL (ref 0–0.6)
CRYPTO NEOFORMANS/GATTII, CRYNEG: NOT DETECTED
CTX-M (ESBL RESISTANT GENE), CTX: NOT DETECTED
DIAGNOSIS, 93000: NORMAL
DIFFERENTIAL METHOD BLD: ABNORMAL
ENTEROBACTER CLOACAE COMPLEX, ECCP: NOT DETECTED
ENTEROBACTERALES SP. , ENBLS: DETECTED
ENTEROCOCCUS FAECALIS, ENFA: NOT DETECTED
ENTEROCOCCUS FAECIUM, ENFAM: NOT DETECTED
EOSINOPHIL # BLD: 0 K/UL (ref 0–0.4)
EOSINOPHIL NFR BLD: 0 % (ref 0–7)
ERYTHROCYTE [DISTWIDTH] IN BLOOD BY AUTOMATED COUNT: 15.6 % (ref 11.5–14.5)
ESCHERICHIA COLI: NOT DETECTED
GLOBULIN SER CALC-MCNC: 4.3 G/DL (ref 2–4)
GLUCOSE SERPL-MCNC: 162 MG/DL (ref 65–100)
HAEMOPHILUS INFLUENZAE, HMI: NOT DETECTED
HCT VFR BLD AUTO: 31.7 % (ref 36.6–50.3)
HGB BLD-MCNC: 10.1 G/DL (ref 12.1–17)
IMM GRANULOCYTES # BLD AUTO: 0 K/UL
IMM GRANULOCYTES NFR BLD AUTO: 0 %
IMP (CARBAPENEMASE RESISTANT GENE), IMPC: NOT DETECTED
KLEBSIELLA AEROGENES, KLAE: NOT DETECTED
KLEBSIELLA OXYTOCA: NOT DETECTED
KLEBSIELLA PNEUMONIAE GROUP, KPPG: NOT DETECTED
KPC (CARBAPENEM RESISTANCE GENE): NOT DETECTED
LISTERIA MONOCYTOGENES, LMONP: NOT DETECTED
LYMPHOCYTES # BLD: 0.7 K/UL (ref 0.8–3.5)
LYMPHOCYTES NFR BLD: 3 % (ref 12–49)
MAGNESIUM SERPL-MCNC: 2.5 MG/DL (ref 1.6–2.4)
MCH RBC QN AUTO: 27.9 PG (ref 26–34)
MCHC RBC AUTO-ENTMCNC: 31.9 G/DL (ref 30–36.5)
MCV RBC AUTO: 87.6 FL (ref 80–99)
MONOCYTES # BLD: 0.9 K/UL (ref 0–1)
MONOCYTES NFR BLD: 4 % (ref 5–13)
NDM (CARBAPENEMASE RESISTANT GENE), NDM: NOT DETECTED
NEISSERIA MENINGITIDIS, NMNI: NOT DETECTED
NEUTS SEG # BLD: 20.9 K/UL (ref 1.8–8)
NEUTS SEG NFR BLD: 90 % (ref 32–75)
NRBC # BLD: 0 K/UL (ref 0–0.01)
NRBC BLD-RTO: 0 PER 100 WBC
OTHER CELLS NFR BLD MANUAL: 3 %
OXA-48-LIKE (CARBAPENEMASE RESISTANT GENE), OXA48: NOT DETECTED
PLATELET # BLD AUTO: 172 K/UL (ref 150–400)
PMV BLD AUTO: 9.7 FL (ref 8.9–12.9)
POTASSIUM SERPL-SCNC: 3.8 MMOL/L (ref 3.5–5.1)
PROCALCITONIN SERPL-MCNC: 2.67 NG/ML
PROT SERPL-MCNC: 6.3 G/DL (ref 6.4–8.2)
PROTEUS, PRP: DETECTED
PSEUDOMONAS AERUGINOSA: NOT DETECTED
Q-T INTERVAL, ECG07: 286 MS
QRS DURATION, ECG06: 104 MS
QTC CALCULATION (BEZET), ECG08: 465 MS
RBC # BLD AUTO: 3.62 M/UL (ref 4.1–5.7)
RBC MORPH BLD: ABNORMAL
RESISTANT GENE SPACE, REGENE: ABNORMAL
SALMONELLA, SALMO: NOT DETECTED
SERRATIA MARCESCENS: NOT DETECTED
SODIUM SERPL-SCNC: 138 MMOL/L (ref 136–145)
STAPH EPIDERMIDIS, STEP: NOT DETECTED
STAPH LUGDUNENSIS, STALUG: NOT DETECTED
STAPHYLOCOCCUS AUREUS: NOT DETECTED
STAPHYLOCOCCUS, STAPP: NOT DETECTED
STENO MALTOPHILIA, STMA: NOT DETECTED
STREPTOCOCCUS , STPSP: NOT DETECTED
STREPTOCOCCUS AGALACTIAE (GROUP B): NOT DETECTED
STREPTOCOCCUS PNEUMONIAE , SPNP: NOT DETECTED
STREPTOCOCCUS PYOGENES (GROUP A), SPYOP: NOT DETECTED
THERAPEUTIC RANGE,PTTT: ABNORMAL SEC (ref 82–109)
THERAPEUTIC RANGE,PTTT: ABNORMAL SEC (ref 82–109)
THERAPEUTIC RANGE,PTTT: NORMAL SEC (ref 82–109)
VENTRICULAR RATE, ECG03: 159 BPM
VIM (CARBAPENEMASE RESISTANT GENE), VIM: NOT DETECTED
WBC # BLD AUTO: 23.2 K/UL (ref 4.1–11.1)

## 2022-08-20 PROCEDURE — 74011250637 HC RX REV CODE- 250/637: Performed by: NURSE PRACTITIONER

## 2022-08-20 PROCEDURE — 86140 C-REACTIVE PROTEIN: CPT

## 2022-08-20 PROCEDURE — 65610000006 HC RM INTENSIVE CARE

## 2022-08-20 PROCEDURE — 74011000250 HC RX REV CODE- 250: Performed by: EMERGENCY MEDICINE

## 2022-08-20 PROCEDURE — 74011250637 HC RX REV CODE- 250/637: Performed by: HOSPITALIST

## 2022-08-20 PROCEDURE — 74011000250 HC RX REV CODE- 250: Performed by: HOSPITALIST

## 2022-08-20 PROCEDURE — 85730 THROMBOPLASTIN TIME PARTIAL: CPT

## 2022-08-20 PROCEDURE — 87150 DNA/RNA AMPLIFIED PROBE: CPT

## 2022-08-20 PROCEDURE — 85025 COMPLETE CBC W/AUTO DIFF WBC: CPT

## 2022-08-20 PROCEDURE — 80053 COMPREHEN METABOLIC PANEL: CPT

## 2022-08-20 PROCEDURE — 36415 COLL VENOUS BLD VENIPUNCTURE: CPT

## 2022-08-20 PROCEDURE — 99232 SBSQ HOSP IP/OBS MODERATE 35: CPT | Performed by: UROLOGY

## 2022-08-20 PROCEDURE — 84145 PROCALCITONIN (PCT): CPT

## 2022-08-20 PROCEDURE — 74011250636 HC RX REV CODE- 250/636: Performed by: HOSPITALIST

## 2022-08-20 PROCEDURE — 99223 1ST HOSP IP/OBS HIGH 75: CPT | Performed by: INTERNAL MEDICINE

## 2022-08-20 PROCEDURE — 74011000258 HC RX REV CODE- 258: Performed by: EMERGENCY MEDICINE

## 2022-08-20 PROCEDURE — 83735 ASSAY OF MAGNESIUM: CPT

## 2022-08-20 PROCEDURE — 74011000258 HC RX REV CODE- 258: Performed by: HOSPITALIST

## 2022-08-20 RX ORDER — METOPROLOL TARTRATE 25 MG/1
12.5 TABLET, FILM COATED ORAL EVERY 12 HOURS
Status: DISCONTINUED | OUTPATIENT
Start: 2022-08-20 | End: 2022-08-26 | Stop reason: HOSPADM

## 2022-08-20 RX ADMIN — METOPROLOL TARTRATE 12.5 MG: 25 TABLET, FILM COATED ORAL at 20:50

## 2022-08-20 RX ADMIN — ACETAMINOPHEN 650 MG: 325 TABLET, FILM COATED ORAL at 20:50

## 2022-08-20 RX ADMIN — DIGOXIN 0.25 MG: 250 TABLET ORAL at 09:09

## 2022-08-20 RX ADMIN — SODIUM CHLORIDE, PRESERVATIVE FREE 10 ML: 5 INJECTION INTRAVENOUS at 14:01

## 2022-08-20 RX ADMIN — OXYCODONE 5 MG: 5 TABLET ORAL at 20:50

## 2022-08-20 RX ADMIN — SODIUM CHLORIDE, PRESERVATIVE FREE 10 ML: 5 INJECTION INTRAVENOUS at 05:13

## 2022-08-20 RX ADMIN — ACETAMINOPHEN 650 MG: 325 TABLET, FILM COATED ORAL at 01:47

## 2022-08-20 RX ADMIN — PIPERACILLIN AND TAZOBACTAM 3.38 G: 3; .375 INJECTION, POWDER, LYOPHILIZED, FOR SOLUTION INTRAVENOUS at 03:57

## 2022-08-20 RX ADMIN — DILTIAZEM HYDROCHLORIDE 12.5 MG/HR: 5 INJECTION, SOLUTION INTRAVENOUS at 01:47

## 2022-08-20 RX ADMIN — PIPERACILLIN AND TAZOBACTAM 3.38 G: 3; .375 INJECTION, POWDER, LYOPHILIZED, FOR SOLUTION INTRAVENOUS at 11:21

## 2022-08-20 RX ADMIN — HEPARIN SODIUM 5000 UNITS: 1000 INJECTION, SOLUTION INTRAVENOUS; SUBCUTANEOUS at 17:41

## 2022-08-20 RX ADMIN — HEPARIN SODIUM 5000 UNITS: 1000 INJECTION, SOLUTION INTRAVENOUS; SUBCUTANEOUS at 09:51

## 2022-08-20 RX ADMIN — HEPARIN SODIUM 16 UNITS/KG/HR: 10000 INJECTION, SOLUTION INTRAVENOUS at 03:55

## 2022-08-20 RX ADMIN — DILTIAZEM HYDROCHLORIDE 5 MG/HR: 5 INJECTION, SOLUTION INTRAVENOUS at 23:32

## 2022-08-20 RX ADMIN — HEPARIN SODIUM 18 UNITS/KG/HR: 10000 INJECTION, SOLUTION INTRAVENOUS at 16:23

## 2022-08-20 RX ADMIN — OXYCODONE 5 MG: 5 TABLET ORAL at 01:47

## 2022-08-20 RX ADMIN — HEPARIN SODIUM 10000 UNITS: 1000 INJECTION, SOLUTION INTRAVENOUS; SUBCUTANEOUS at 01:54

## 2022-08-20 RX ADMIN — PIPERACILLIN AND TAZOBACTAM 3.38 G: 3; .375 INJECTION, POWDER, LYOPHILIZED, FOR SOLUTION INTRAVENOUS at 20:50

## 2022-08-20 RX ADMIN — METOPROLOL TARTRATE 12.5 MG: 25 TABLET, FILM COATED ORAL at 11:21

## 2022-08-20 NOTE — PROGRESS NOTES
UROLOGY Progress Note          [phone]      Daily Progress Note: 8/20/2022      Subjective: The patient is seen for UROLOGIC follow  up. Scrotal cellulitis/ sepsis  He feels better with less pain  Problem List:  Patient Active Problem List   Diagnosis Code    Hypertension I10    DVT (deep venous thrombosis) ([facility]) I82.409    Dysuria R30.0    SIRS (systemic inflammatory response syndrome) ([facility]) R65.10    Acute renal failure (ARF) ([facility]) N17.9    Testicular swelling N50.89    Acute renal failure ([facility]) N17.9    Atrial fibrillation with rapid ventricular response ([facility]) I48.91         Medications reviewed  Current Facility-Administered Medications   Medication Dose Route Frequency    piperacillin-tazobactam (ZOSYN) 3.375 g in 0.9% sodium chloride (MBP/ADV) 100 mL MBP  3.375 g IntraVENous Q8H    heparin 25,000 units in D5W 250 ml infusion  18-36 Units/kg/hr (Adjusted) IntraVENous TITRATE    heparin (porcine) 1,000 unit/mL injection 10,000 Units  10,000 Units IntraVENous PRN    Or    heparin (porcine) 1,000 unit/mL injection 5,000 Units  5,000 Units IntraVENous PRN    oxyCODONE IR (ROXICODONE) tablet 5 mg  5 mg Oral Q4H PRN    digoxin (LANOXIN) tablet 0.25 mg  0.25 mg Oral DAILY    dilTIAZem (CARDIZEM) 125 mg in 0.9% sodium chloride 125 mL ([id number])  0-15 mg/hr IntraVENous TITRATE    sodium chloride (NS) flush 5-40 mL  5-40 mL IntraVENous Q8H    sodium chloride (NS) flush 5-40 mL  5-40 mL IntraVENous PRN    acetaminophen (TYLENOL) tablet 650 mg  650 mg Oral Q6H PRN    Or    acetaminophen (TYLENOL) suppository 650 mg  650 mg Rectal Q6H PRN    ondansetron (ZOFRAN ODT) tablet 4 mg  4 mg Oral Q8H PRN    Or    ondansetron (ZOFRAN) injection 4 mg  4 mg IntraVENous Q6H PRN    levoFLOXacin (LEVAQUIN) tablet 750 mg  750 mg Oral Q24H       Review of Systems:   Review of Systems   Musculoskeletal:  Positive for back pain. All other systems reviewed and are negative.         Objective:   Physical Exam  Constitutional: Appearance: Normal appearance. He is obese. HENT:      Head: Normocephalic and atraumatic. Eyes:      Extraocular Movements: Extraocular movements intact. Cardiovascular:      Rate and Rhythm: Normal rate and regular rhythm. Pulmonary:      Effort: Pulmonary effort is normal. No respiratory distress. Breath sounds: No stridor. No wheezing or rhonchi. Abdominal:      General: There is no distension. Palpations: Abdomen is soft. Tenderness: There is no abdominal tenderness. Genitourinary:     Comments: Siddiqi draining clear yellow. Blood at meatus  Anterior scrotum edematous. Less tender, less erythema  Musculoskeletal:      Cervical back: Normal range of motion. No rigidity. Skin:     General: Skin is warm and dry. Neurological:      General: No focal deficit present. Mental Status: He is alert and oriented to person, place, and time. Psychiatric:         Mood and Affect: Mood normal.         Behavior: Behavior normal.        Visit Vitals  BP (!) 120/97   Pulse (!) 102   Temp 98.6 °F (37 °C)   Resp 23   Ht 5' 9\" (1.753 m)   Wt (!) 597 lb 14.2 oz (271.2 kg)   SpO2 98%   BMI 88.29 kg/m²         Data Review:       Recent Days:  Recent Labs     08/20/22  0500 08/19/22  0443 08/18/22 1932   WBC 23.2* 21.5* 17.5*   HGB 10.1* 10.4* 11.2*   HCT 31.7* 33.1* 35.1*    130* 117*     Recent Labs     08/20/22  0500 08/19/22  0443 08/18/22 1932    134* 132*   K 3.8 3.6 4.6    100 100   CO2 25 26 22   * 221* 178*   BUN 33* 54* 62*   CREA 1.31* 1.75* 2.20*   CA 7.5* 8.2* 8.9   MG 2.5* 2.5* 2.5*   PHOS  --  3.9  --    ALB 2.0* 1.8* 2.1*   TBILI 2.1* 2.3* 2.8*   ALT 34 26 29   INR  --   --  1.1     No results for input(s): PH, PCO2, PO2, HCO3, FIO2 in the last 72 hours.       CRP 7.73 from 13.6  Procalcitonin 2.67 from 10.44      Assessment/     Patient Active Problem List   Diagnosis Code    Hypertension I10    DVT (deep venous thrombosis) ([facility]) I82.409    Dysuria R30.0    SIRS (systemic inflammatory response syndrome) ([facility]) R65.10    Acute renal failure (ARF) ([facility]) N17.9    Testicular swelling N50.89    Acute renal failure ([facility]) N17.9    Atrial fibrillation with rapid ventricular response ([facility]) I48.91   Scrotal cellulitis, stable to improve. Hematuria with possible seymour irritation  D/c seymour when medically stable.   External collection device or urinal afterward    Plan:  Continue abx    Michelle Yousif MD

## 2022-08-20 NOTE — PROGRESS NOTES
[facility] CARDIOLOGY  CARDIOLOGY PROGRESS NOTE    HISTORY OF PRESENT ILLNESS:  Elma Garcia is a 47y.o. year-old male with past medical history significant for morbid obesity, DVT, lymphedema, ? DM who was evaluated today due to new onset AF. Patient presented with scrotal pain and was found to be in AF. No complaints of chest pain, shortness of breath, CVA, PAD, HTN. Was found to have elevated blood sugar on admit. 8/19: Patient was seen and examined in the ER. Complaining of scrotal pain. Denies chest pain or discomfort. 8/20: Patient seen and examined in ICU. Endorses back pain and ongoing scrotal pain/edema. Denies chest pain or dyspnea. Remains on diltiazem and heparin drips. Records from hospital admission course thus far reviewed. Telemetry reviewed. AF 90-100s   EKG shows atrial fibrillation with     INPATIENT MEDICATIONS:  Home medications reviewed.     Current Facility-Administered Medications:     piperacillin-tazobactam (ZOSYN) 3.375 g in 0.9% sodium chloride (MBP/ADV) 100 mL MBP, 3.375 g, IntraVENous, Q8H, Walter Gilliam MD, Last Rate: 25 mL/hr at 08/20/22 0357, 3.375 g at 08/20/22 0357    heparin 25,000 units in D5W 250 ml infusion, 18-36 Units/kg/hr (Adjusted), IntraVENous, TITRATE, Walter Gilliam MD, Last Rate: 24 mL/hr at 08/20/22 0950, 18 Units/kg/hr at 08/20/22 0950    heparin (porcine) 1,000 unit/mL injection 10,000 Units, 10,000 Units, IntraVENous, PRN, 10,000 Units at 08/20/22 0154 **OR** heparin (porcine) 1,000 unit/mL injection 5,000 Units, 5,000 Units, IntraVENous, PRN, Walter Gilliam MD, 5,000 Units at 08/20/22 0951    oxyCODONE IR (ROXICODONE) tablet 5 mg, 5 mg, Oral, Q4H PRN, Walter Gilliam MD, 5 mg at 08/20/22 0147    digoxin (LANOXIN) tablet 0.25 mg, 0.25 mg, Oral, DAILY, Charity Harmon NP, 0.25 mg at 08/20/22 0909    dilTIAZem (CARDIZEM) 125 mg in 0.9% sodium chloride 125 mL ([id number]), 0-15 mg/hr, IntraVENous, TITRATE, Cam Cristobal MD, Last Rate: 10 mL/hr at 08/20/22 0658, 10 mg/hr at 08/20/22 0658    sodium chloride (NS) flush 5-40 mL, 5-40 mL, IntraVENous, Q8H, Terell Edouard MD, 10 mL at 08/20/22 0513    sodium chloride (NS) flush 5-40 mL, 5-40 mL, IntraVENous, PRN, Terell Edouard MD    acetaminophen (TYLENOL) tablet 650 mg, 650 mg, Oral, Q6H PRN, 650 mg at 08/20/22 0147 **OR** acetaminophen (TYLENOL) suppository 650 mg, 650 mg, Rectal, Q6H PRN, Terell Edouard MD    ondansetron (ZOFRAN ODT) tablet 4 mg, 4 mg, Oral, Q8H PRN **OR** ondansetron (ZOFRAN) injection 4 mg, 4 mg, IntraVENous, Q6H PRN, Terell Edouard MD    levoFLOXacin (LEVAQUIN) tablet 750 mg, 750 mg, Oral, Q24H, Terell Edouard MD, 750 mg at 08/19/22 2232     ALLERGIES:  Allergies reviewed with the patient,No Known Allergies . FAMILY HISTORY:  Family history reviewed. Not significant. SOCIAL HISTORY:  Notable for no  tobacco use, no heavy alcohol or illicit drug use. REVIEW OF SYSTEMS:  Complete review of systems performed, pertinents noted above, all other systems are negative. PHYSICAL EXAMINATION:  Vital sign assessment reveal a blood pressure of  106/64  and pulse rate of 120s. Cardiovascular exam has a heart with a irregular rate and rhythm, normal S1 and S2. No murmur present. There are no rubs or gallops. Good peripheral pulses. No jugular venous distension. No carotid bruits are present. Respiratory exam reveals clear lung fields, no rales or rhonchi. Gastrointestinal exam has soft, nontender abdomen with normal bowel sounds. Lymphatic exam reveals chronic lymphedema and no varicosities. Chronic hyperpigmented skin changes. Neurologic exam is nonfocal.  Musculoskeletal exam is notable for a normal gait. Recent labs results and imaging reviewed.   Notable findings include   Lab Results   Component Value Date/Time    WBC 23.2 (H) 08/20/2022 05:00 AM    HGB 10.1 (L) 08/20/2022 05:00 AM    HCT 31.7 (L) 08/20/2022 05:00 AM    PLATELET 100 94/26/8077 05:00 AM    MCV 87.6 08/20/2022 05:00 AM     Lab Results   Component Value Date/Time    TSH 4.74 (H) 08/18/2022 07:32 PM      Lab Results   Component Value Date/Time    CK 29 (L) 08/18/2022 07:32 PM     .       Case was discussed with Dr. Dilia Moreno and our impression and recommendations are as follows:      1 Atrial fibrillation with rapid rate:   Continue  heparin and dig 0.25 mg po daily. BP acceptable despite sepsis on admission. Will resume low dose beta blocker to wean dilt drip. Echo pending. HR up to 130 is acceptable considering ongoing sepsis. 2.  DVT: Chronic lymphedema per patient. Unclear history. Will get LE venous duplex. Will need chronic AC. 3.  Super morbid obesity: Educated on diet and lifestyle modifications. Thank you for involving us in the care of this patient. Please do not hesitate to call if additional questions arise. RITIKA Carmona  8/20/2022     MERYL Farmer Addendum:  Agree with findings and plan noted above. Rate is elevated, but due to sepsis picture.

## 2022-08-20 NOTE — PROGRESS NOTES
Hospitalist Progress Note    Subjective:   Daily Progress Note: 8/20/2022 12:55 PM    Admission HPI/Hx (per Dr. Manny Wilkerson):  47year old morbidly obese male presents to the emergency room complaining of swelling of scrotal area with severe pain. Presented yesterday with same complaint, was placed on seymour catheter. States sill with severe pain and swelling, worsening with pain. On exam and US scrotum did not reveal any evidence of abscess. Seymour catheter with urine output, UA today with large blood and leukocyte esterase. .  On evaluation he is found to have atrial fibrillation with tachycardia. Even though not having any temperature he looks warm and flushed. Denies any chest pain, palpitations, cough or trouble breathing. Denies any headache. Complains of still having a lot of pain in the genitalia. He is started on IV diltiazem in the emergency room. But he looks volume contracted. And review of care everywhere records 8/2020 he has normal renal functions. He had a surgical excision of the fungating penile mass with incision and drainage of scrotal abscess, at that time had surgical reconstruction done. At the same time had urethral dilation. 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 Patient since then not following back with urology, not sure if he is following with anybody else. After evaluation labs were added, suggestive of elevated sed rate, C-reactive protein and procalcitonin suggestive of infectious etiology. Also elevated uric acid and increased BUN/creatinine suggest volume contraction dehydration. Even though urine analysis has positive large blood with no RBC no evidence of rhabdomyolysis with low CK.  ---------------    Subjective: In ICU. I spoke with Dr. Roque/Urology; continue IV Abx. No active CP, SOB, Nausea. Testicular pain better.     Current Facility-Administered Medications   Medication Dose Route Frequency    metoprolol tartrate (LOPRESSOR) tablet 12.5 mg  12.5 mg Oral Q12H    piperacillin-tazobactam (ZOSYN) 3.375 g in 0.9% sodium chloride (MBP/ADV) 100 mL MBP  3.375 g IntraVENous Q8H    heparin 25,000 units in D5W 250 ml infusion  18-36 Units/kg/hr (Adjusted) IntraVENous TITRATE    heparin (porcine) 1,000 unit/mL injection 10,000 Units  10,000 Units IntraVENous PRN    Or    heparin (porcine) 1,000 unit/mL injection 5,000 Units  5,000 Units IntraVENous PRN    oxyCODONE IR (ROXICODONE) tablet 5 mg  5 mg Oral Q4H PRN    digoxin (LANOXIN) tablet 0.25 mg  0.25 mg Oral DAILY    dilTIAZem (CARDIZEM) 125 mg in 0.9% sodium chloride 125 mL ([id number])  0-15 mg/hr IntraVENous TITRATE    sodium chloride (NS) flush 5-40 mL  5-40 mL IntraVENous Q8H    sodium chloride (NS) flush 5-40 mL  5-40 mL IntraVENous PRN    acetaminophen (TYLENOL) tablet 650 mg  650 mg Oral Q6H PRN    Or    acetaminophen (TYLENOL) suppository 650 mg  650 mg Rectal Q6H PRN    ondansetron (ZOFRAN ODT) tablet 4 mg  4 mg Oral Q8H PRN    Or    ondansetron (ZOFRAN) injection 4 mg  4 mg IntraVENous Q6H PRN        Review of Systems    Constitutional: No - fever, chills, weight change    HEENT: No - sore throat, sinus pressure, ear pain    Respiratory: No - cough, sob, wheeze, hemoptysis, pleuritic pain    Cardiovascular: No - chest pain, palpitations, extremity edema    Gastrointestinal: No - n/v/d/bleeding/abd pain    Genitourinary: Testicular pain    Neurological: No - headache, focal weakness, LOC, paresthesia    Skin: No - new rash, discoloration    M/S: No - joint pain, back pain, injury      Objective:     Visit Vitals  /71   Pulse 79   Temp 98.4 °F (36.9 °C)   Resp 21   Ht 5' 9\" (1.753 m)   Wt (!) 271.2 kg (597 lb 14.2 oz)   SpO2 100%   BMI 88.29 kg/m²      O2 Device: None (Room air)    Temp (24hrs), Av.3 °F (36.8 °C), Min:98 °F (36.7 °C), Max:98.6 °F (37 °C)      701 - 0  In: 500 [P.O.:500]  Out: 500 [Urine:500]  1901 -  0700  In: 3142.5 [P.O.:250; I.V.:2892.5]  Out: 3075 [Urine:1000]    PHYSICAL EXAM    Constitutional: Morbidly obese (500#)    HEENT: Poor dentition    Neck: Supple     Cardiovascular: S1S2, Irreg; Tele A Fib:  rate    Respiratory:  CTA ant bilat     GI: Soft, NT; no rigidity; + bowel sounds    : Siddiqi; pink urine; edematous scrotum    Musculoskeletal: Moving all extremities spontaneously; Neurological:  AxOx2; No new focal weakness; No tremors    Psychiatric: Appropriate mood; oriented    Skin: Thickened venous stasis dermatitis LE bilat    Vascular: Palpable pulses; + lymphedema      Data Review    Recent Results (from the past 24 hour(s))   PTT    Collection Time: 08/19/22  3:22 PM   Result Value Ref Range    aPTT >153.0 (HH) 21.2 - 34.1 sec    aPTT, therapeutic range   82 - 109 sec   MRSA SCREEN - PCR (NASAL)    Collection Time: 08/19/22  6:27 PM   Result Value Ref Range    MRSA by PCR, Nasal Not Detected Not Detected     PTT    Collection Time: 08/20/22 12:00 AM   Result Value Ref Range    aPTT 33.3 21.2 - 34.1 sec    aPTT, therapeutic range   82 - 109 sec   C REACTIVE PROTEIN, QT    Collection Time: 08/20/22  5:00 AM   Result Value Ref Range    C-Reactive protein 7.73 (H) 0.00 - 0.60 mg/dL   CBC WITH AUTOMATED DIFF    Collection Time: 08/20/22  5:00 AM   Result Value Ref Range    WBC 23.2 (H) 4.1 - 11.1 K/uL    RBC 3.62 (L) 4.10 - 5.70 M/uL    HGB 10.1 (L) 12.1 - 17.0 g/dL    HCT 31.7 (L) 36.6 - 50.3 %    MCV 87.6 80.0 - 99.0 FL    MCH 27.9 26.0 - 34.0 PG    MCHC 31.9 30.0 - 36.5 g/dL    RDW 15.6 (H) 11.5 - 14.5 %    PLATELET 091 917 - 006 K/uL    MPV 9.7 8.9 - 12.9 FL    NRBC 0.0 0.0  WBC    ABSOLUTE NRBC 0.00 0.00 - 0.01 K/uL    NEUTROPHILS 90 (H) 32 - 75 %    LYMPHOCYTES 3 (L) 12 - 49 %    MONOCYTES 4 (L) 5 - 13 %    EOSINOPHILS 0 0 - 7 %    BASOPHILS 0 0 - 1 %    OTHER CELL 3 %    IMMATURE GRANULOCYTES 0 %    ABS. NEUTROPHILS 20.9 (H) 1.8 - 8.0 K/UL    ABS.  LYMPHOCYTES 0.7 (L) 0.8 - 3.5 K/UL ABS. MONOCYTES 0.9 0.0 - 1.0 K/UL    ABS. EOSINOPHILS 0.0 0.0 - 0.4 K/UL    ABS. BASOPHILS 0.0 0.0 - 0.1 K/UL    ABS. IMM. GRANS. 0.0 K/UL    DF Manual      RBC COMMENTS Normocytic, Normochromic     METABOLIC PANEL, COMPREHENSIVE    Collection Time: 08/20/22  5:00 AM   Result Value Ref Range    Sodium 138 136 - 145 mmol/L    Potassium 3.8 3.5 - 5.1 mmol/L    Chloride 106 97 - 108 mmol/L    CO2 25 21 - 32 mmol/L    Anion gap 7 5 - 15 mmol/L    Glucose 162 (H) 65 - 100 mg/dL    BUN 33 (H) 6 - 20 mg/dL    Creatinine 1.31 (H) 0.70 - 1.30 mg/dL    BUN/Creatinine ratio 25 (H) 12 - 20      GFR est AA >60 >60 ml/min/1.73m2    GFR est non-AA 58 (L) >60 ml/min/1.73m2    Calcium 7.5 (L) 8.5 - 10.1 mg/dL    Bilirubin, total 2.1 (H) 0.2 - 1.0 mg/dL    AST (SGOT) 37 15 - 37 U/L    ALT (SGPT) 34 12 - 78 U/L    Alk.  phosphatase 160 (H) 45 - 117 U/L    Protein, total 6.3 (L) 6.4 - 8.2 g/dL    Albumin 2.0 (L) 3.5 - 5.0 g/dL    Globulin 4.3 (H) 2.0 - 4.0 g/dL    A-G Ratio 0.5 (L) 1.1 - 2.2     MAGNESIUM    Collection Time: 08/20/22  5:00 AM   Result Value Ref Range    Magnesium 2.5 (H) 1.6 - 2.4 mg/dL   PROCALCITONIN    Collection Time: 08/20/22  5:00 AM   Result Value Ref Range    Procalcitonin 2.67 (H) 0 ng/mL   BLOOD CULTURE ID PANEL    Collection Time: 08/20/22  7:00 AM    Specimen: Blood   Result Value Ref Range    Acc. no. from Micro Order Blood Culture Bottle      Enterococcus faecalis Not detected Not detected    Enterococcus faecium Not detected Not detected    Listeria monocytogenes Not detected Not detected    Staphylococcus Not detected Not detected    Staphylococcus aureus Not detected Not detected    Staph epidermidis Not detected Not detected    Staph lugdunensis Not detected Not detected    Streptococcus Not detected Not detected    Streptococcus agalactiae (Group B) Not detected Not detected    Streptococcus pneumoniae Not detected Not detected    Streptococcus pyogenes (Group A) Not detected Not detected Acinetobacter calcoaceticus-baumanii complex Not detected Not detected    Bacteroides fragilis Not detected Not detected    Enterobacterales species Detected (A) Not detected    Enterobacter cloacae complex Not detected Not detected    Escherichia coli Not detected Not detected    Klebsiella aerogenes Not detected Not detected    Klebsiella oxytoca Not detected Not detected    Klebsiella pneumoniae group Not detected Not detected    Proteus Detected (A) Not detected    Salmonella Not detected Not detected    Serratia marcescens Not detected Not detected    Haemophilus influenzae Not detected Not detected    Neisseria meningitidis Not detected Not detected    Pseudomonas aeruginosa Not detected Not detected    Steno maltophilia Not detected Not detected    Candida albicans Not detected Not detected    Candida auris Not detected Not detected    Candida glabrata Not detected Not detected    Candida krusei Not detected Not detected    Candida parapsilosis Not detected Not detected    Candida tropicalis Not detected Not detected    Crypto neoformans/gattii Not detected Not detected    RESISTANT GENES:        CTX-M (ESBL resistant gene) Not detected Not detected    IMP (Carbapenemase resistant gene) Not detected Not detected    KPC (Carbapenem Resistance Gene) Not detected Not detected    NDM (Carbapenemase resistant gene) Not detected Not detected    OXA-48-like (Carbapenemase resistant gene) Not detected Not detected    VIM (Carbapenemase resistant gene) Not detected Not detected    Comment       False positive results may rarely occur. Correlate with   PTT    Collection Time: 08/20/22  8:20 AM   Result Value Ref Range    aPTT 35.9 (H) 21.2 - 34.1 sec    aPTT, therapeutic range   82 - 109 sec       XR CHEST PORT   Final Result   Limited examination with no acute infiltrate suspected.           Active Problems:    SIRS (systemic inflammatory response syndrome) ([facility] [address] (8/18/2022)      Acute renal failure (ARF) ([facility]) (8/18/2022)      Testicular swelling (8/18/2022)      Acute renal failure ([facility] [address] (8/18/2022)      Atrial fibrillation with rapid ventricular response ([facility] [address] (8/18/2022)      Assessment/Plan:     Sepsis (clinically) with Scrotal Cellulitis ?  - ID consulted/Mello  - Cont Zosyn/Levaquin  - Urology evaluated. CT cannot be done d/t weight. Ultrasound shows no abscess  - Procal 10 --> 2; CRP 13 --> 7  - WBC still elevated ~22    A Fib RVR  - Cardizem gtt  - Hep gtt  - Cadrdiology/Dr. Jhony Argueta on consult    OSIEL; improving  - Received IVF  - Cr 2.2 --> 1.3    Elevated BNP; D/t RVR?  Echo pending    Chronic Venous Stasis/Lymphedema  - Venous Duplex    Anemia with mild thrombocytopenia    Severe Morbid Obesity (500#)      DVT Prophylaxis: On AC  Code Status: Full Code    Care Plan discussed with:   ________________________________________  Gilbert Andrew MD

## 2022-08-20 NOTE — PROGRESS NOTES
Problem: Falls - Risk of  Goal: *Absence of Falls  Description: Document Colton Camiloack Fall Risk and appropriate interventions in the flowsheet. Outcome: Progressing Towards Goal  Note: Fall Risk Interventions:            Medication Interventions: Teach patient to arise slowly, Patient to call before getting OOB, Bed/chair exit alarm    Elimination Interventions: Call light in reach, Bed/chair exit alarm, Toileting schedule/hourly rounds    History of Falls Interventions: Bed/chair exit alarm, Door open when patient unattended, Room close to nurse's station         Problem: Pressure Injury - Risk of  Goal: *Prevention of pressure injury  Description: Document Kiko Scale and appropriate interventions in the flowsheet. Outcome: Progressing Towards Goal  Note: Pressure Injury Interventions:  Sensory Interventions: Assess changes in LOC, Check visual cues for pain, Keep linens dry and wrinkle-free, Minimize linen layers, Monitor skin under medical devices, Pressure redistribution bed/mattress (bed type)    Moisture Interventions: Absorbent underpads, Check for incontinence Q2 hours and as needed, Internal/External urinary devices, Maintain skin hydration (lotion/cream), Minimize layers, Moisture barrier    Activity Interventions: Pressure redistribution bed/mattress(bed type)    Mobility Interventions: Pressure redistribution bed/mattress (bed type), Turn and reposition approx.  every two hours(pillow and wedges)    Nutrition Interventions: Document food/fluid/supplement intake, Offer support with meals,snacks and hydration    Friction and Shear Interventions: Apply protective barrier, creams and emollients, Minimize layers, Transferring/repositioning devices                Problem: Afib Pathway: Day 2  Goal: *Stable cardiac rhythm  Outcome: Progressing Towards Goal     Problem: General Infection Care Plan (Adult and Pediatric)  Goal: Improvement in signs and symptoms of infection  Outcome: Progressing Towards Goal

## 2022-08-20 NOTE — PROGRESS NOTES
Progress Note    Patient: Lonnie Duggan. MRN: [id number]  SSN: [id number]    YOB: 1970  Age: 47 y.o. Sex: male      Admit Date: 8/18/2022    LOS: 2 days     Subjective:   Patient followed for sepsis with scrotal cellulitis and UTI. Urine cuture growing Gram negative rods and today blood cultures as well. He is afebrile with WBC unchanged, but CRP and procal decreasing. Patient is awake and responsive. He remains in the ICU. Objective:     Vitals:    08/20/22 1121 08/20/22 1136 08/20/22 1200 08/20/22 1206   BP: 121/81 116/82  118/72   Pulse: 89 88 89 82   Resp: 21 23  20   Temp:       SpO2: 100% 100%  100%   Weight:       Height:            Intake and Output:  Current Shift: 08/20 0701 - 08/20 1900  In: 500 [P.O.:500]  Out: 500 [Urine:500]  Last three shifts: 08/18 1901 - 08/20 0700  In: 3142.5 [P.O.:250; I.V.:2892.5]  Out: 3980 [Urine:1000]    Physical Exam:       Vitals and nursing note reviewed. Constitutional:       Appearance: He is obese. He is ill-appearing. HENT:      Head: Normocephalic and atraumatic. Right Ear: External ear normal.      Left Ear: External ear normal.      Nose: Nose normal.      Mouth/Throat:      Pharynx: Oropharynx is clear. Eyes:      Pupils: Pupils are equal, round, and reactive to light. Cardiovascular:      Rate and Rhythm: Normal rate and regular rhythm. Heart sounds: No murmur heard. Pulmonary:      Effort: Pulmonary effort is normal.      Breath sounds: Normal breath sounds. Abdominal:      General: Bowel sounds are normal. There is no distension. Palpations: Abdomen is soft. Tenderness: There is no abdominal tenderness. There is no right CVA tenderness or left CVA tenderness. Genitourinary:     Comments: Siddiqi catheter     Severe scrotal swelling, erythema and tenderness     Suprapubic pain  Musculoskeletal:         General: Swelling present. Cervical back: Neck supple. Right lower leg: Edema present. Left lower leg: Edema present. Skin:     Findings: No rash. Comments: Hyperkeratotic changes both distal calves likely secondary to chronic venous stasis along with erythema   Neurological:      General: No focal deficit present. Mental Status: He is alert and oriented to person, place, and time. Psychiatric:         Mood and Affect: Mood normal.         Behavior: Behavior normal.         Thought Content: Thought content normal.         Judgment: Judgment normal.     Lab/Data Review:     WBC 23,200     CRP 7.73 <13.60  Procal 2.67 <10.44    Urine cultures (8/17) >100,000 col/ml Gram negative rods  Blood cultures (8/18) Gram negative rods    Assessment:     Active Problems:    SIRS (systemic inflammatory response syndrome) ([address] (8/18/2022)      Acute renal failure (ARF) ([address] (8/18/2022)      Testicular swelling (8/18/2022)      Acute renal failure ([address] (8/18/2022)      Atrial fibrillation with rapid ventricular response ([address] (8/18/2022)    Sepsis with leukocytosis, elevated ESR, CRP, procal  Scrotal cellulitis, etiology unclear  UTI with pyuria and bacteria, secondary to Gram negative rods  Gram negative bacteremia, presumed secondary to UTI  Chronic lymphedema with venous stasis dermatitis  Morbid obesity    Plan:         1. Continue IV Zosyn  2. Discontinue Levaquin  3. In am, repeat CBC, CRP, procal  4.  Follow-up blood and urine cultures    Signed By: Miguelina Tate MD     August 20, 2022

## 2022-08-20 NOTE — PROGRESS NOTES
Problem: Falls - Risk of  Goal: *Absence of Falls  Description: Document Earma Lynsey Fall Risk and appropriate interventions in the flowsheet. Outcome: Progressing Towards Goal  Note: Fall Risk Interventions:  Mobility Interventions: Bed/chair exit alarm, Patient to call before getting OOB         Medication Interventions: Bed/chair exit alarm, Patient to call before getting OOB    Elimination Interventions: Bed/chair exit alarm, Call light in reach, Patient to call for help with toileting needs    History of Falls Interventions: Bed/chair exit alarm         Problem: Patient Education: Go to Patient Education Activity  Goal: Patient/Family Education  Outcome: Progressing Towards Goal     Problem: Pressure Injury - Risk of  Goal: *Prevention of pressure injury  Description: Document Kiko Scale and appropriate interventions in the flowsheet.   Outcome: Progressing Towards Goal  Note: Pressure Injury Interventions:  Sensory Interventions: Keep linens dry and wrinkle-free, Maintain/enhance activity level, Minimize linen layers, Pressure redistribution bed/mattress (bed type)    Moisture Interventions: Absorbent underpads, Apply protective barrier, creams and emollients, Internal/External urinary devices, Limit adult briefs, Maintain skin hydration (lotion/cream), Minimize layers, Moisture barrier    Activity Interventions: Pressure redistribution bed/mattress(bed type)    Mobility Interventions: Pressure redistribution bed/mattress (bed type)    Nutrition Interventions: Document food/fluid/supplement intake    Friction and Shear Interventions: Apply protective barrier, creams and emollients, Lift sheet, Minimize layers                Problem: Patient Education: Go to Patient Education Activity  Goal: Patient/Family Education  Outcome: Progressing Towards Goal     Problem: Patient Education: Go to Patient Education Activity  Goal: Patient/Family Education  Outcome: Progressing Towards Goal     Problem: Afib Pathway: Day 1  Goal: Off Pathway (Use only if patient is Off Pathway)  Outcome: Progressing Towards Goal  Goal: Activity/Safety  Outcome: Progressing Towards Goal  Goal: Consults, if ordered  Outcome: Progressing Towards Goal  Goal: Diagnostic Test/Procedures  Outcome: Progressing Towards Goal  Goal: Nutrition/Diet  Outcome: Progressing Towards Goal  Goal: Discharge Planning  Outcome: Progressing Towards Goal  Goal: Medications  Outcome: Progressing Towards Goal  Goal: Respiratory  Outcome: Progressing Towards Goal  Goal: Treatments/Interventions/Procedures  Outcome: Progressing Towards Goal  Goal: Psychosocial  Outcome: Progressing Towards Goal  Goal: *Optimal pain control at patient's stated goal  Outcome: Progressing Towards Goal  Goal: *Hemodynamically stable  Outcome: Progressing Towards Goal  Goal: *Stable cardiac rhythm  Outcome: Progressing Towards Goal  Goal: *Lungs clear or at baseline  Outcome: Progressing Towards Goal  Goal: *Labs within defined limits  Outcome: Progressing Towards Goal  Goal: *Describes available resources and support systems  Outcome: Progressing Towards Goal     Problem: Afib Pathway: Day 2  Goal: Off Pathway (Use only if patient is Off Pathway)  Outcome: Progressing Towards Goal  Goal: Activity/Safety  Outcome: Progressing Towards Goal  Goal: Consults, if ordered  Outcome: Progressing Towards Goal  Goal: Diagnostic Test/Procedures  Outcome: Progressing Towards Goal  Goal: Nutrition/Diet  Outcome: Progressing Towards Goal  Goal: Discharge Planning  Outcome: Progressing Towards Goal  Goal: Medications  Outcome: Progressing Towards Goal  Goal: Respiratory  Outcome: Progressing Towards Goal  Goal: Treatments/Interventions/Procedures  Outcome: Progressing Towards Goal  Goal: Psychosocial  Outcome: Progressing Towards Goal  Goal: *Hemodynamically stable  Outcome: Progressing Towards Goal  Goal: *Optimal pain control at patient's stated goal  Outcome: Progressing Towards Goal  Goal: *Stable cardiac rhythm  Outcome: Progressing Towards Goal  Goal: *Lungs clear or at baseline  Outcome: Progressing Towards Goal  Goal: *Describes available resources and support systems  Outcome: Progressing Towards Goal     Problem: Afib Pathway: Day 3  Goal: Off Pathway (Use only if patient is Off Pathway)  Outcome: Progressing Towards Goal  Goal: Activity/Safety  Outcome: Progressing Towards Goal  Goal: Diagnostic Test/Procedures  Outcome: Progressing Towards Goal  Goal: Nutrition/Diet  Outcome: Progressing Towards Goal  Goal: Discharge Planning  Outcome: Progressing Towards Goal  Goal: Medications  Outcome: Progressing Towards Goal  Goal: Respiratory  Outcome: Progressing Towards Goal  Goal: Treatments/Interventions/Procedures  Outcome: Progressing Towards Goal  Goal: Psychosocial  Outcome: Progressing Towards Goal     Problem: Afib: Discharge Outcomes (not in CAT)  Goal: *Hemodynamically stable  Outcome: Progressing Towards Goal  Goal: *Stable cardiac rhythm  Outcome: Progressing Towards Goal  Goal: *Lungs clear or at baseline  Outcome: Progressing Towards Goal  Goal: *Optimal pain control at patient's stated goal  Outcome: Progressing Towards Goal  Goal: *Identifies cardiac risk factors  Outcome: Progressing Towards Goal  Goal: *Verbalizes home exercise program, activity guidelines, cardiac precautions  Outcome: Progressing Towards Goal  Goal: *Verbalizes understanding and describes prescribed diet  Outcome: Progressing Towards Goal  Goal: *Verbalizes understanding and describes medication purposes and frequencies  Outcome: Progressing Towards Goal  Goal: *Anxiety reduced or absent  Outcome: Progressing Towards Goal  Goal: *Understands and describes signs and symptoms to report to providers(Stroke Metric)  Outcome: Progressing Towards Goal  Goal: *Describes follow-up/return visits to physicians  Outcome: Progressing Towards Goal  Goal: *Describes available resources and support systems  Outcome: Progressing Towards Goal  Goal: *Influenza immunization  Outcome: Progressing Towards Goal  Goal: *Pneumococcal immunization  Outcome: Progressing Towards Goal  Goal: *Describes smoking cessation resources  Outcome: Progressing Towards Goal     Problem: General Infection Care Plan (Adult and Pediatric)  Goal: Improvement in signs and symptoms of infection  Outcome: Progressing Towards Goal  Goal: *Optimize nutritional status  Outcome: Progressing Towards Goal     Problem: Patient Education: Go to Patient Education Activity  Goal: Patient/Family Education  Outcome: Progressing Towards Goal

## 2022-08-21 ENCOUNTER — APPOINTMENT (OUTPATIENT)
Dept: NON INVASIVE DIAGNOSTICS | Age: 52
DRG: 872 | End: 2022-08-21
Attending: NURSE PRACTITIONER
Payer: MEDICARE

## 2022-08-21 LAB
ANION GAP SERPL CALC-SCNC: 5 MMOL/L (ref 5–15)
APTT PPP: 46.4 SEC (ref 21.2–34.1)
APTT PPP: 48.7 SEC (ref 21.2–34.1)
APTT PPP: 61.2 SEC (ref 21.2–34.1)
APTT PPP: 66.7 SEC (ref 21.2–34.1)
BASOPHILS # BLD: 0 K/UL (ref 0–0.1)
BASOPHILS NFR BLD: 0 % (ref 0–1)
BUN SERPL-MCNC: 22 MG/DL (ref 6–20)
BUN/CREAT SERPL: 19 (ref 12–20)
CA-I BLD-MCNC: 7.5 MG/DL (ref 8.5–10.1)
CHLORIDE SERPL-SCNC: 102 MMOL/L (ref 97–108)
CO2 SERPL-SCNC: 26 MMOL/L (ref 21–32)
CREAT SERPL-MCNC: 1.18 MG/DL (ref 0.7–1.3)
CRP SERPL-MCNC: 8.73 MG/DL (ref 0–0.6)
DIFFERENTIAL METHOD BLD: ABNORMAL
EOSINOPHIL # BLD: 0 K/UL (ref 0–0.4)
EOSINOPHIL NFR BLD: 0 % (ref 0–7)
ERYTHROCYTE [DISTWIDTH] IN BLOOD BY AUTOMATED COUNT: 15.4 % (ref 11.5–14.5)
GLUCOSE BLD STRIP.AUTO-MCNC: 170 MG/DL (ref 65–117)
GLUCOSE BLD STRIP.AUTO-MCNC: 180 MG/DL (ref 65–117)
GLUCOSE SERPL-MCNC: 177 MG/DL (ref 65–100)
HCT VFR BLD AUTO: 32.5 % (ref 36.6–50.3)
HGB BLD-MCNC: 10.4 G/DL (ref 12.1–17)
IMM GRANULOCYTES # BLD AUTO: 0 K/UL
IMM GRANULOCYTES NFR BLD AUTO: 0 %
LYMPHOCYTES # BLD: 2.4 K/UL (ref 0.8–3.5)
LYMPHOCYTES NFR BLD: 10 % (ref 12–49)
MAGNESIUM SERPL-MCNC: 2.3 MG/DL (ref 1.6–2.4)
MCH RBC QN AUTO: 27.9 PG (ref 26–34)
MCHC RBC AUTO-ENTMCNC: 32 G/DL (ref 30–36.5)
MCV RBC AUTO: 87.1 FL (ref 80–99)
MONOCYTES # BLD: 1.4 K/UL (ref 0–1)
MONOCYTES NFR BLD: 6 % (ref 5–13)
MYELOCYTES NFR BLD MANUAL: 1 %
NEUTS SEG # BLD: 19.8 K/UL (ref 1.8–8)
NEUTS SEG NFR BLD: 83 % (ref 32–75)
NRBC # BLD: 0.03 K/UL (ref 0–0.01)
NRBC BLD-RTO: 0.1 PER 100 WBC
PERFORMED BY, TECHID: ABNORMAL
PERFORMED BY, TECHID: ABNORMAL
PLATELET # BLD AUTO: 233 K/UL (ref 150–400)
PLATELET COMMENTS,PCOM: ABNORMAL
PMV BLD AUTO: 9.7 FL (ref 8.9–12.9)
POTASSIUM SERPL-SCNC: 3.9 MMOL/L (ref 3.5–5.1)
PROCALCITONIN SERPL-MCNC: 1.94 NG/ML
RBC # BLD AUTO: 3.73 M/UL (ref 4.1–5.7)
RBC MORPH BLD: ABNORMAL
SODIUM SERPL-SCNC: 133 MMOL/L (ref 136–145)
T3FREE SERPL-MCNC: 2.9 PG/ML (ref 2.2–4)
T4 FREE SERPL-MCNC: 1.2 NG/DL (ref 0.8–1.5)
THERAPEUTIC RANGE,PTTT: ABNORMAL SEC (ref 82–109)
WBC # BLD AUTO: 23.8 K/UL (ref 4.1–11.1)

## 2022-08-21 PROCEDURE — 82962 GLUCOSE BLOOD TEST: CPT

## 2022-08-21 PROCEDURE — 93970 EXTREMITY STUDY: CPT

## 2022-08-21 PROCEDURE — 74011250637 HC RX REV CODE- 250/637: Performed by: NURSE PRACTITIONER

## 2022-08-21 PROCEDURE — 85730 THROMBOPLASTIN TIME PARTIAL: CPT

## 2022-08-21 PROCEDURE — 85025 COMPLETE CBC W/AUTO DIFF WBC: CPT

## 2022-08-21 PROCEDURE — 74011250637 HC RX REV CODE- 250/637: Performed by: HOSPITALIST

## 2022-08-21 PROCEDURE — 74011250636 HC RX REV CODE- 250/636: Performed by: HOSPITALIST

## 2022-08-21 PROCEDURE — 74011000250 HC RX REV CODE- 250: Performed by: HOSPITALIST

## 2022-08-21 PROCEDURE — 74011250637 HC RX REV CODE- 250/637: Performed by: INTERNAL MEDICINE

## 2022-08-21 PROCEDURE — 74011250636 HC RX REV CODE- 250/636: Performed by: INTERNAL MEDICINE

## 2022-08-21 PROCEDURE — 74011000258 HC RX REV CODE- 258: Performed by: HOSPITALIST

## 2022-08-21 PROCEDURE — 83735 ASSAY OF MAGNESIUM: CPT

## 2022-08-21 PROCEDURE — 36415 COLL VENOUS BLD VENIPUNCTURE: CPT

## 2022-08-21 PROCEDURE — 84481 FREE ASSAY (FT-3): CPT

## 2022-08-21 PROCEDURE — 74011000258 HC RX REV CODE- 258: Performed by: INTERNAL MEDICINE

## 2022-08-21 PROCEDURE — 65610000006 HC RM INTENSIVE CARE

## 2022-08-21 PROCEDURE — 80048 BASIC METABOLIC PNL TOTAL CA: CPT

## 2022-08-21 PROCEDURE — 99233 SBSQ HOSP IP/OBS HIGH 50: CPT | Performed by: INTERNAL MEDICINE

## 2022-08-21 PROCEDURE — 84439 ASSAY OF FREE THYROXINE: CPT

## 2022-08-21 PROCEDURE — 74011000250 HC RX REV CODE- 250: Performed by: INTERNAL MEDICINE

## 2022-08-21 PROCEDURE — 84145 PROCALCITONIN (PCT): CPT

## 2022-08-21 PROCEDURE — 86140 C-REACTIVE PROTEIN: CPT

## 2022-08-21 RX ORDER — CHOLECALCIFEROL (VITAMIN D3) 125 MCG
5 CAPSULE ORAL
Status: DISCONTINUED | OUTPATIENT
Start: 2022-08-21 | End: 2022-08-26 | Stop reason: HOSPADM

## 2022-08-21 RX ADMIN — METOPROLOL TARTRATE 12.5 MG: 25 TABLET, FILM COATED ORAL at 21:12

## 2022-08-21 RX ADMIN — ACETAMINOPHEN 650 MG: 325 TABLET, FILM COATED ORAL at 04:11

## 2022-08-21 RX ADMIN — OXYCODONE 5 MG: 5 TABLET ORAL at 04:10

## 2022-08-21 RX ADMIN — DIGOXIN 0.25 MG: 250 TABLET ORAL at 09:31

## 2022-08-21 RX ADMIN — SODIUM CHLORIDE, PRESERVATIVE FREE 1 G: 5 INJECTION INTRAVENOUS at 10:18

## 2022-08-21 RX ADMIN — HEPARIN SODIUM 5000 UNITS: 1000 INJECTION, SOLUTION INTRAVENOUS; SUBCUTANEOUS at 01:30

## 2022-08-21 RX ADMIN — MEROPENEM 1 G: 1 INJECTION, POWDER, FOR SOLUTION INTRAVENOUS at 17:18

## 2022-08-21 RX ADMIN — OXYCODONE 5 MG: 5 TABLET ORAL at 15:09

## 2022-08-21 RX ADMIN — SODIUM CHLORIDE, PRESERVATIVE FREE 10 ML: 5 INJECTION INTRAVENOUS at 15:08

## 2022-08-21 RX ADMIN — HEPARIN SODIUM 5000 UNITS: 1000 INJECTION, SOLUTION INTRAVENOUS; SUBCUTANEOUS at 22:30

## 2022-08-21 RX ADMIN — HEPARIN SODIUM 5000 UNITS: 1000 INJECTION, SOLUTION INTRAVENOUS; SUBCUTANEOUS at 15:07

## 2022-08-21 RX ADMIN — HEPARIN SODIUM 26 UNITS/KG/HR: 10000 INJECTION, SOLUTION INTRAVENOUS at 17:16

## 2022-08-21 RX ADMIN — SODIUM CHLORIDE, PRESERVATIVE FREE 10 ML: 5 INJECTION INTRAVENOUS at 22:34

## 2022-08-21 RX ADMIN — HEPARIN SODIUM 5000 UNITS: 1000 INJECTION, SOLUTION INTRAVENOUS; SUBCUTANEOUS at 09:30

## 2022-08-21 RX ADMIN — METOPROLOL TARTRATE 12.5 MG: 25 TABLET, FILM COATED ORAL at 09:30

## 2022-08-21 RX ADMIN — HEPARIN SODIUM 22 UNITS/KG/HR: 10000 INJECTION, SOLUTION INTRAVENOUS at 01:33

## 2022-08-21 RX ADMIN — HEPARIN SODIUM 24 UNITS/KG/HR: 10000 INJECTION, SOLUTION INTRAVENOUS at 09:29

## 2022-08-21 RX ADMIN — PIPERACILLIN AND TAZOBACTAM 3.38 G: 3; .375 INJECTION, POWDER, LYOPHILIZED, FOR SOLUTION INTRAVENOUS at 04:10

## 2022-08-21 RX ADMIN — MELATONIN TAB 5 MG 5 MG: 5 TAB at 21:13

## 2022-08-21 RX ADMIN — ACETAMINOPHEN 650 MG: 325 TABLET, FILM COATED ORAL at 21:12

## 2022-08-21 NOTE — PROGRESS NOTES
Hospitalist Progress Note    Subjective:   Daily Progress Note: 8/21/2022 12:55 PM    Admission HPI/Hx (per Dr. Anai Castanon):  47year old morbidly obese male presents to the emergency room complaining of swelling of scrotal area with severe pain. Presented yesterday with same complaint, was placed on seymour catheter. States sill with severe pain and swelling, worsening with pain. On exam and US scrotum did not reveal any evidence of abscess. Seymour catheter with urine output, UA today with large blood and leukocyte esterase. .  On evaluation he is found to have atrial fibrillation with tachycardia. Even though not having any temperature he looks warm and flushed. Denies any chest pain, palpitations, cough or trouble breathing. Denies any headache. Complains of still having a lot of pain in the genitalia. He is started on IV diltiazem in the emergency room. But he looks volume contracted. And review of care everywhere records 8/2020 he has normal renal functions. He had a surgical excision of the fungating penile mass with incision and drainage of scrotal abscess, at that time had surgical reconstruction done. At the same time had urethral dilation. 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 Patient since then not following back with urology, not sure if he is following with anybody else. After evaluation labs were added, suggestive of elevated sed rate, C-reactive protein and procalcitonin suggestive of infectious etiology. Also elevated uric acid and increased BUN/creatinine suggest volume contraction dehydration. Even though urine analysis has positive large blood with no RBC no evidence of rhabdomyolysis with low CK.  ---------------    Subjective: Doing better. No active complaint. Decreasing scrotal pain. No n/v, cp, sob.     Current Facility-Administered Medications   Medication Dose Route Frequency    metoprolol tartrate (LOPRESSOR) tablet 12.5 mg  12.5 mg Oral Q12H    piperacillin-tazobactam (ZOSYN) 3.375 g in 0.9% sodium chloride (MBP/ADV) 100 mL MBP  3.375 g IntraVENous Q8H    heparin 25,000 units in D5W 250 ml infusion  18-36 Units/kg/hr (Adjusted) IntraVENous TITRATE    heparin (porcine) 1,000 unit/mL injection 10,000 Units  10,000 Units IntraVENous PRN    Or    heparin (porcine) 1,000 unit/mL injection 5,000 Units  5,000 Units IntraVENous PRN    oxyCODONE IR (ROXICODONE) tablet 5 mg  5 mg Oral Q4H PRN    digoxin (LANOXIN) tablet 0.25 mg  0.25 mg Oral DAILY    dilTIAZem (CARDIZEM) 125 mg in 0.9% sodium chloride 125 mL ([id number])  0-15 mg/hr IntraVENous TITRATE    sodium chloride (NS) flush 5-40 mL  5-40 mL IntraVENous Q8H    sodium chloride (NS) flush 5-40 mL  5-40 mL IntraVENous PRN    acetaminophen (TYLENOL) tablet 650 mg  650 mg Oral Q6H PRN    Or    acetaminophen (TYLENOL) suppository 650 mg  650 mg Rectal Q6H PRN    ondansetron (ZOFRAN ODT) tablet 4 mg  4 mg Oral Q8H PRN    Or    ondansetron (ZOFRAN) injection 4 mg  4 mg IntraVENous Q6H PRN        Review of Systems    Constitutional: No - fever, chills, weight change    HEENT: No - sore throat, sinus pressure, ear pain    Respiratory: No - cough, sob, wheeze, hemoptysis, pleuritic pain    Cardiovascular: No - chest pain, palpitations, extremity edema    Gastrointestinal: No - n/v/d/bleeding/abd pain    Genitourinary: Testicular pain better    Neurological: No - headache, focal weakness, LOC, paresthesia    Skin: No - new rash, discoloration    M/S: No - joint pain, back pain, injury      Objective:     Visit Vitals  /70   Pulse 94   Temp 97.9 °F (36.6 °C)   Resp 17   Ht 5' 9\" (1.753 m)   Wt (!) 274.1 kg (604 lb 4.5 oz)   SpO2 97%   BMI 89.24 kg/m²      O2 Device: None (Room air)    Temp (24hrs), Av.2 °F (36.8 °C), Min:97.8 °F (36.6 °C), Max:98.6 °F (37 °C)      No intake/output data recorded.  1901 -  0700  In: 2314 [P.O.:750;  I.V.:1564]  Out: 2097 [SQQAV:0268]    PHYSICAL EXAM    Constitutional: Morbidly obese (500#), In good spirits    HEENT: Poor dentition    Neck: Supple     Cardiovascular: S1S2, Irreg; Tele A Fib:  rate    Respiratory:  CTA ant bilat     GI: Soft, NT; no rigidity; + bowel sounds    : Siddiqi; pink urine; edematous scrotum    Musculoskeletal: Moving all extremities spontaneously; Neurological:  AxOx2; No new focal weakness; No tremors    Psychiatric: Appropriate mood; oriented    Skin: Thickened venous stasis dermatitis LE bilat    Vascular: Palpable pulses; + lymphedema      Data Review    Recent Results (from the past 24 hour(s))   PTT    Collection Time: 08/20/22  8:20 AM   Result Value Ref Range    aPTT 35.9 (H) 21.2 - 34.1 sec    aPTT, therapeutic range   82 - 109 sec   PTT    Collection Time: 08/20/22  4:15 PM   Result Value Ref Range    aPTT 36.4 (H) 21.2 - 34.1 sec    aPTT, therapeutic range   82 - 109 sec   GLUCOSE, POC    Collection Time: 08/21/22 12:01 AM   Result Value Ref Range    Glucose (POC) 180 (H) 65 - 117 mg/dL    Performed by Rah Lacey (PCT)    PTT    Collection Time: 08/21/22 12:45 AM   Result Value Ref Range    aPTT 46.4 (H) 21.2 - 34.1 sec    aPTT, therapeutic range   82 - 109 sec   CBC WITH AUTOMATED DIFF    Collection Time: 08/21/22 12:45 AM   Result Value Ref Range    WBC 23.8 (H) 4.1 - 11.1 K/uL    RBC 3.73 (L) 4.10 - 5.70 M/uL    HGB 10.4 (L) 12.1 - 17.0 g/dL    HCT 32.5 (L) 36.6 - 50.3 %    MCV 87.1 80.0 - 99.0 FL    MCH 27.9 26.0 - 34.0 PG    MCHC 32.0 30.0 - 36.5 g/dL    RDW 15.4 (H) 11.5 - 14.5 %    PLATELET 695 875 - 287 K/uL    MPV 9.7 8.9 - 12.9 FL    NRBC 0.1 (H) 0.0  WBC    ABSOLUTE NRBC 0.03 (H) 0.00 - 0.01 K/uL    NEUTROPHILS 83 (H) 32 - 75 %    LYMPHOCYTES 10 (L) 12 - 49 %    MONOCYTES 6 5 - 13 %    EOSINOPHILS 0 0 - 7 %    BASOPHILS 0 0 - 1 %    MYELOCYTES 1 (H) 0 %    IMMATURE GRANULOCYTES 0 %    ABS. NEUTROPHILS 19.8 (H) 1.8 - 8.0 K/UL    ABS.  LYMPHOCYTES 2.4 0.8 - 3.5 K/UL ABS. MONOCYTES 1.4 (H) 0.0 - 1.0 K/UL    ABS. EOSINOPHILS 0.0 0.0 - 0.4 K/UL    ABS. BASOPHILS 0.0 0.0 - 0.1 K/UL    ABS. IMM. GRANS. 0.0 K/UL    DF Manual      PLATELET COMMENTS Giant Platelets      RBC COMMENTS Polychromasia  1+       C REACTIVE PROTEIN, QT    Collection Time: 08/21/22 12:45 AM   Result Value Ref Range    C-Reactive protein 8.73 (H) 0.00 - 0.60 mg/dL   PROCALCITONIN    Collection Time: 08/21/22 12:45 AM   Result Value Ref Range    Procalcitonin 1.94 (H) 0 ng/mL   MAGNESIUM    Collection Time: 08/21/22 12:45 AM   Result Value Ref Range    Magnesium 2.3 1.6 - 2.4 mg/dL   METABOLIC PANEL, BASIC    Collection Time: 08/21/22 12:45 AM   Result Value Ref Range    Sodium 133 (L) 136 - 145 mmol/L    Potassium 3.9 3.5 - 5.1 mmol/L    Chloride 102 97 - 108 mmol/L    CO2 26 21 - 32 mmol/L    Anion gap 5 5 - 15 mmol/L    Glucose 177 (H) 65 - 100 mg/dL    BUN 22 (H) 6 - 20 mg/dL    Creatinine 1.18 0.70 - 1.30 mg/dL    BUN/Creatinine ratio 19 12 - 20      GFR est AA >60 >60 ml/min/1.73m2    GFR est non-AA >60 >60 ml/min/1.73m2    Calcium 7.5 (L) 8.5 - 10.1 mg/dL   GLUCOSE, POC    Collection Time: 08/21/22  5:34 AM   Result Value Ref Range    Glucose (POC) 170 (H) 65 - 117 mg/dL    Performed by [facility] (PCT)        XR CHEST PORT   Final Result   Limited examination with no acute infiltrate suspected. Active Problems:    SIRS (systemic inflammatory response syndrome) (HCC) (8/18/2022)      Acute renal failure (ARF) ([address] (8/18/2022)      Testicular swelling (8/18/2022)      Acute renal failure ([address] (8/18/2022)      Atrial fibrillation with rapid ventricular response ([address] (8/18/2022)      Assessment/Plan:     Sepsis (clinically) with Scrotal Cellulitis & Proteus Bacteremia  - ID consulted/Mello  - Cont Zosyn/Levaquin  - Urology evaluated. CT cannot be done d/t weight.  Ultrasound shows no abscess  - Procal 10 --> 2; CRP 13 --> 7  - WBC still elevated ~23    A Fib RVR  - Cardizem gtt  - Hep gtt  - Cadrdiology/Dr. Dunne Sit on consult    OSIEL; improving  - Received IVF  - Cr 2.2 --> 1.18    Elevated BNP; D/t RVR?  Echo pending    Chronic Venous Stasis/Lymphedema  - Venous Duplex    Anemia with mild thrombocytopenia    Severe Morbid Obesity (500#)      DVT Prophylaxis: On AC  Code Status: Full Code    Care Plan discussed with:   ________________________________________  Luis Fernando Davis MD

## 2022-08-21 NOTE — PROGRESS NOTES
[facility] CARDIOLOGY  CARDIOLOGY PROGRESS NOTE    HISTORY OF PRESENT ILLNESS:  Noemi Barreto is a 47y.o. year-old male with past medical history significant for morbid obesity, DVT, lymphedema, ? DM who was evaluated today due to new onset AF. Patient presented with scrotal pain and was found to be in AF. No complaints of chest pain, shortness of breath, CVA, PAD, HTN. Was found to have elevated blood sugar on admit. 8/19: Patient was seen and examined in the ER. Complaining of scrotal pain. Denies chest pain or discomfort. 8/20: Patient seen and examined in ICU. Endorses back pain and ongoing scrotal pain/edema. Denies chest pain or dyspnea. Remains on diltiazem and heparin drips. 8/21: Patient seen and examined. Remains in ICU. No c/o chest pain or dyspnea. Remains in afib 80-100s. Heparin drip. Dilt drip weaned to 5mg/hr. Records from hospital admission course thus far reviewed. Telemetry reviewed. AF 90-100s      INPATIENT MEDICATIONS:  Home medications reviewed.     Current Facility-Administered Medications:     melatonin tablet 5 mg, 5 mg, Oral, QHS PRN, Mary Fregoso MD    [COMPLETED] meropenem (MERREM) 1 g in 0.9% sodium chloride 20 mL IV syringe, 1 g, IntraVENous, NOW, 1 g at 08/21/22 1018 **FOLLOWED BY** meropenem (MERREM) 1 g in 0.9% sodium chloride (MBP/ADV) 50 mL MBP, 1 g, IntraVENous, Q8H, Seda Sarkar, MD    metoprolol tartrate (LOPRESSOR) tablet 12.5 mg, 12.5 mg, Oral, Q12H, Annetta King FNP, 12.5 mg at 08/21/22 0930    heparin 25,000 units in D5W 250 ml infusion, 18-36 Units/kg/hr (Adjusted), IntraVENous, TITRATE, Kesha Strauss MD, Last Rate: 31.9 mL/hr at 08/21/22 0929, 24 Units/kg/hr at 08/21/22 0929    heparin (porcine) 1,000 unit/mL injection 10,000 Units, 10,000 Units, IntraVENous, PRN, 10,000 Units at 08/20/22 0154 **OR** heparin (porcine) 1,000 unit/mL injection 5,000 Units, 5,000 Units, IntraVENous, PRN, Kesha Strauss MD, 5,000 Units at 08/21/22 0930    oxyCODONE IR (ROXICODONE) tablet 5 mg, 5 mg, Oral, Q4H PRN, Carlitos Floyd MD, 5 mg at 08/21/22 0410    digoxin (LANOXIN) tablet 0.25 mg, 0.25 mg, Oral, DAILY, Neela Hussein NP, 0.25 mg at 08/21/22 0931    dilTIAZem (CARDIZEM) 125 mg in 0.9% sodium chloride 125 mL ([id number]), 0-15 mg/hr, IntraVENous, TITRATE, Frank Ibrahim MD, Last Rate: 5 mL/hr at 08/20/22 2332, 5 mg/hr at 08/20/22 2332    sodium chloride (NS) flush 5-40 mL, 5-40 mL, IntraVENous, Q8H, Carlitos Floyd MD, 10 mL at 08/20/22 1401    sodium chloride (NS) flush 5-40 mL, 5-40 mL, IntraVENous, PRN, Carlitos Floyd MD    acetaminophen (TYLENOL) tablet 650 mg, 650 mg, Oral, Q6H PRN, 650 mg at 08/21/22 0411 **OR** acetaminophen (TYLENOL) suppository 650 mg, 650 mg, Rectal, Q6H PRN, Carlitos Floyd MD    ondansetron (ZOFRAN ODT) tablet 4 mg, 4 mg, Oral, Q8H PRN **OR** ondansetron (ZOFRAN) injection 4 mg, 4 mg, IntraVENous, Q6H PRN, Carlitos Floyd MD     ALLERGIES:  Allergies reviewed with the patient,No Known Allergies . FAMILY HISTORY:  Family history reviewed. Not significant. SOCIAL HISTORY:  Notable for no  tobacco use, no heavy alcohol or illicit drug use. REVIEW OF SYSTEMS:  Complete review of systems performed, pertinents noted above, all other systems are negative. PHYSICAL EXAMINATION:  Vital sign assessment reveal a blood pressure of  118/70  and pulse rate of 90s. Cardiovascular exam has a heart with a irregular rate and rhythm, normal S1 and S2. No murmur present. There are no rubs or gallops. Good peripheral pulses. No jugular venous distension. No carotid bruits are present. Respiratory exam reveals clear lung fields, no rales or rhonchi. Gastrointestinal exam has soft, nontender abdomen with normal bowel sounds. Lymphatic exam reveals chronic lymphedema and no varicosities. Chronic hyperpigmented skin changes.   Neurologic exam is nonfocal. Musculoskeletal exam is notable for a normal gait. Recent labs results and imaging reviewed. Notable findings include   Lab Results   Component Value Date/Time    WBC 23.8 (H) 08/21/2022 12:45 AM    HGB 10.4 (L) 08/21/2022 12:45 AM    HCT 32.5 (L) 08/21/2022 12:45 AM    PLATELET 233 94/01/0241 12:45 AM    MCV 87.1 08/21/2022 12:45 AM     Lab Results   Component Value Date/Time    TSH 4.74 (H) 08/18/2022 07:32 PM      Lab Results   Component Value Date/Time    CK 29 (L) 08/18/2022 07:32 PM     .       Case was discussed with Dr. Marvel Diaz and our impression and recommendations are as follows:      1 Atrial fibrillation with rapid rate: Continue to wean diltiazem drip to off. HR up to 130 is acceptable considering ongoing sepsis. Continue metoprolol tartrate 12.5mg BID and dig 0.25 mg po daily. BP acceptable despite sepsis on admission. TSH noted above; this may be contributory to new AF. Will add free T3/4 for correlate. Echo is pending. 2.  DVT: Chronic lymphedema per patient. Unclear history. LE venous duplex pending. Will need chronic AC. 3.  Super morbid obesity: Educated on diet and lifestyle modifications. 4.  OSIEL: Creatinine improving. 1.18 today. Plan per primary. 5.  Sepsis: ID following. Thank you for involving us in the care of this patient. Please do not hesitate to call if additional questions arise. RITIKA Card  8/21/2022     MERYL Farmer Addendum:  Agree with findings and plan noted above. HR still higher, but appears to be due to sepsis.

## 2022-08-21 NOTE — PROGRESS NOTES
Progress Note    Patient: Elvis Crawley MRN: [id number]  SSN: [id number]    YOB: 1970  Age: 47 y.o. Sex: male      Admit Date: 8/18/2022    LOS: 3 days     Subjective:   Patient followed for sepsis with scrotal cellulitis and UTI. Urine cuture growing Proteus mirabilis and blood cultures growing Proteus species. He is afebrile with WBC essentially unchanged with increasing CRP. Patient is awake and responsive. He remains in the ICU. Patient currently on IV Zosyn. Objective:     Vitals:    08/21/22 0800 08/21/22 0830 08/21/22 0900 08/21/22 0930   BP: 116/71 128/82 122/79 115/88   Pulse: 83 (!) 101 100 (!) 106   Resp: 17   21   Temp:       SpO2: 99% 99% 95% 98%   Weight:       Height:            Intake and Output:  Current Shift: No intake/output data recorded. Last three shifts: 08/19 1901 - 08/21 0700  In: 2314 [P.O.:750; I.V.:1564]  Out: 1277 [Urine:1875]    Physical Exam:       Vitals and nursing note reviewed. Constitutional:       Appearance: He is obese. He is ill-appearing. HENT:      Head: Normocephalic and atraumatic. Right Ear: External ear normal.      Left Ear: External ear normal.      Nose: Nose normal.      Mouth/Throat:      Pharynx: Oropharynx is clear. Eyes:      Pupils: Pupils are equal, round, and reactive to light. Cardiovascular:      Rate and Rhythm: Normal rate and regular rhythm. Heart sounds: No murmur heard. Pulmonary:      Effort: Pulmonary effort is normal.      Breath sounds: Normal breath sounds. Abdominal:      General: Bowel sounds are normal. There is no distension. Palpations: Abdomen is soft. Tenderness: There is no abdominal tenderness. There is no right CVA tenderness or left CVA tenderness. Genitourinary:     Comments: Siddiqi catheter     Severe scrotal swelling, erythema and tenderness     Suprapubic pain  Musculoskeletal:         General: Swelling present. Cervical back: Neck supple.       Right lower leg: Edema present. Left lower leg: Edema present. Skin:     Findings: No rash. Comments: Hyperkeratotic changes both distal calves likely secondary to chronic venous stasis along with erythema   Neurological:      General: No focal deficit present. Mental Status: He is alert and oriented to person, place, and time. Psychiatric:         Mood and Affect: Mood normal.         Behavior: Behavior normal.         Thought Content: Thought content normal.         Judgment: Judgment normal.     Lab/Data Review:     WBC 23,800     CRP 8.73 <7.73 <13.60  Procal 1.94 <2.67 <10.44    Urine cultures (8/17) >100,000 col/ml Proteus mirabilis  Blood cultures (8/18) Proteus species    Assessment:     Active Problems:    SIRS (systemic inflammatory response syndrome) ([address] (8/18/2022)      Acute renal failure (ARF) ([address] (8/18/2022)      Testicular swelling (8/18/2022)      Acute renal failure ([address] (8/18/2022)      Atrial fibrillation with rapid ventricular response ([address] (8/18/2022)  Sepsis with leukocytosis, elevated ESR, CRP, procal  Scrotal cellulitis, etiology unclear  UTI with pyuria and bacteria, secondary to Proteus mirabilis, on IV Zosyn rods  Gram negative bacteremia, with Proteus species, presumed secondary to UTI, on IV Zosyn  Chronic lymphedema with venous stasis dermatitis  Morbid obesity    Comment:  Concerned by suboptimal response to Zosyn. Plan:         1. Discontinue Zosyn  2. Start IV Meropenem pending susceptibility results  3. In am, repeat CBC, CRP, procal  4.  Follow-up blood and urine cultures    Signed By: Max Osborn MD     August 21, 2022

## 2022-08-21 NOTE — PROGRESS NOTES
Problem: Falls - Risk of  Goal: *Absence of Falls  Description: Document Vanessa Blood Fall Risk and appropriate interventions in the flowsheet. Outcome: Progressing Towards Goal  Note: Fall Risk Interventions:  Mobility Interventions: Bed/chair exit alarm, PT Consult for mobility concerns, Strengthening exercises (ROM-active/passive)         Medication Interventions: Bed/chair exit alarm, Patient to call before getting OOB    Elimination Interventions: Bed/chair exit alarm, Call light in reach, Patient to call for help with toileting needs    History of Falls Interventions: Bed/chair exit alarm         Problem: Patient Education: Go to Patient Education Activity  Goal: Patient/Family Education  Outcome: Progressing Towards Goal     Problem: Pressure Injury - Risk of  Goal: *Prevention of pressure injury  Description: Document Kiko Scale and appropriate interventions in the flowsheet.   Outcome: Progressing Towards Goal  Note: Pressure Injury Interventions:  Sensory Interventions: Float heels, Keep linens dry and wrinkle-free, Minimize linen layers, Pressure redistribution bed/mattress (bed type)    Moisture Interventions: Absorbent underpads, Internal/External urinary devices, Limit adult briefs, Maintain skin hydration (lotion/cream), Minimize layers, Contain wound drainage    Activity Interventions: Pressure redistribution bed/mattress(bed type)    Mobility Interventions: Pressure redistribution bed/mattress (bed type)    Nutrition Interventions: Document food/fluid/supplement intake    Friction and Shear Interventions: Lift sheet, Minimize layers                Problem: Patient Education: Go to Patient Education Activity  Goal: Patient/Family Education  Outcome: Progressing Towards Goal     Problem: Patient Education: Go to Patient Education Activity  Goal: Patient/Family Education  Outcome: Progressing Towards Goal     Problem: Afib Pathway: Day 1  Goal: Off Pathway (Use only if patient is Off Pathway)  Outcome: Progressing Towards Goal  Goal: Activity/Safety  Outcome: Progressing Towards Goal  Goal: Consults, if ordered  Outcome: Progressing Towards Goal  Goal: Diagnostic Test/Procedures  Outcome: Progressing Towards Goal  Goal: Nutrition/Diet  Outcome: Progressing Towards Goal  Goal: Discharge Planning  Outcome: Progressing Towards Goal  Goal: Medications  Outcome: Progressing Towards Goal  Goal: Respiratory  Outcome: Progressing Towards Goal  Goal: Treatments/Interventions/Procedures  Outcome: Progressing Towards Goal  Goal: Psychosocial  Outcome: Progressing Towards Goal  Goal: *Optimal pain control at patient's stated goal  Outcome: Progressing Towards Goal  Goal: *Hemodynamically stable  Outcome: Progressing Towards Goal  Goal: *Stable cardiac rhythm  Outcome: Progressing Towards Goal  Goal: *Lungs clear or at baseline  Outcome: Progressing Towards Goal  Goal: *Labs within defined limits  Outcome: Progressing Towards Goal  Goal: *Describes available resources and support systems  Outcome: Progressing Towards Goal     Problem: Afib Pathway: Day 2  Goal: Off Pathway (Use only if patient is Off Pathway)  Outcome: Progressing Towards Goal  Goal: Activity/Safety  Outcome: Progressing Towards Goal  Goal: Consults, if ordered  Outcome: Progressing Towards Goal  Goal: Diagnostic Test/Procedures  Outcome: Progressing Towards Goal  Goal: Nutrition/Diet  Outcome: Progressing Towards Goal  Goal: Discharge Planning  Outcome: Progressing Towards Goal  Goal: Medications  Outcome: Progressing Towards Goal  Goal: Respiratory  Outcome: Progressing Towards Goal  Goal: Treatments/Interventions/Procedures  Outcome: Progressing Towards Goal  Goal: Psychosocial  Outcome: Progressing Towards Goal  Goal: *Hemodynamically stable  Outcome: Progressing Towards Goal  Goal: *Optimal pain control at patient's stated goal  Outcome: Progressing Towards Goal  Goal: *Stable cardiac rhythm  Outcome: Progressing Towards Goal  Goal: *Lungs clear or at baseline  Outcome: Progressing Towards Goal  Goal: *Describes available resources and support systems  Outcome: Progressing Towards Goal     Problem: Afib Pathway: Day 3  Goal: Off Pathway (Use only if patient is Off Pathway)  Outcome: Progressing Towards Goal  Goal: Activity/Safety  Outcome: Progressing Towards Goal  Goal: Diagnostic Test/Procedures  Outcome: Progressing Towards Goal  Goal: Nutrition/Diet  Outcome: Progressing Towards Goal  Goal: Discharge Planning  Outcome: Progressing Towards Goal  Goal: Medications  Outcome: Progressing Towards Goal  Goal: Respiratory  Outcome: Progressing Towards Goal  Goal: Treatments/Interventions/Procedures  Outcome: Progressing Towards Goal  Goal: Psychosocial  Outcome: Progressing Towards Goal     Problem: Afib: Discharge Outcomes (not in CAT)  Goal: *Hemodynamically stable  Outcome: Progressing Towards Goal  Goal: *Stable cardiac rhythm  Outcome: Progressing Towards Goal  Goal: *Lungs clear or at baseline  Outcome: Progressing Towards Goal  Goal: *Optimal pain control at patient's stated goal  Outcome: Progressing Towards Goal  Goal: *Identifies cardiac risk factors  Outcome: Progressing Towards Goal  Goal: *Verbalizes home exercise program, activity guidelines, cardiac precautions  Outcome: Progressing Towards Goal  Goal: *Verbalizes understanding and describes prescribed diet  Outcome: Progressing Towards Goal  Goal: *Verbalizes understanding and describes medication purposes and frequencies  Outcome: Progressing Towards Goal  Goal: *Anxiety reduced or absent  Outcome: Progressing Towards Goal  Goal: *Understands and describes signs and symptoms to report to providers(Stroke Metric)  Outcome: Progressing Towards Goal  Goal: *Describes follow-up/return visits to physicians  Outcome: Progressing Towards Goal  Goal: *Describes available resources and support systems  Outcome: Progressing Towards Goal  Goal: *Influenza immunization  Outcome: Progressing Towards Goal  Goal: *Pneumococcal immunization  Outcome: Progressing Towards Goal  Goal: *Describes smoking cessation resources  Outcome: Progressing Towards Goal     Problem: General Infection Care Plan (Adult and Pediatric)  Goal: Improvement in signs and symptoms of infection  Outcome: Progressing Towards Goal  Goal: *Optimize nutritional status  Outcome: Progressing Towards Goal     Problem: Patient Education: Go to Patient Education Activity  Goal: Patient/Family Education  Outcome: Progressing Towards Goal

## 2022-08-22 LAB
ANION GAP SERPL CALC-SCNC: 7 MMOL/L (ref 5–15)
APPEARANCE UR: CLEAR
APTT PPP: 84.4 SEC (ref 21.2–34.1)
APTT PPP: 88.6 SEC (ref 21.2–34.1)
BACTERIA SPEC CULT: ABNORMAL
BACTERIA SPEC CULT: ABNORMAL
BACTERIA URNS QL MICRO: ABNORMAL /HPF
BASOPHILS # BLD: 0 K/UL (ref 0–0.1)
BASOPHILS NFR BLD: 0 % (ref 0–1)
BILIRUB UR QL: NEGATIVE
BUN SERPL-MCNC: 16 MG/DL (ref 6–20)
BUN/CREAT SERPL: 15 (ref 12–20)
CA-I BLD-MCNC: 7.8 MG/DL (ref 8.5–10.1)
CAOX CRY URNS QL MICRO: ABNORMAL
CHLORIDE SERPL-SCNC: 102 MMOL/L (ref 97–108)
CO2 SERPL-SCNC: 26 MMOL/L (ref 21–32)
COLONY COUNT,CNT: ABNORMAL
COLOR UR: ABNORMAL
CREAT SERPL-MCNC: 1.07 MG/DL (ref 0.7–1.3)
CRP SERPL-MCNC: 11 MG/DL (ref 0–0.6)
DIFFERENTIAL METHOD BLD: ABNORMAL
DIGOXIN SERPL-MCNC: 0.5 NG/ML (ref 0.9–2)
EOSINOPHIL # BLD: 0 K/UL (ref 0–0.4)
EOSINOPHIL NFR BLD: 0 % (ref 0–7)
ERYTHROCYTE [DISTWIDTH] IN BLOOD BY AUTOMATED COUNT: 15.7 % (ref 11.5–14.5)
GLUCOSE SERPL-MCNC: 172 MG/DL (ref 65–100)
GLUCOSE UR STRIP.AUTO-MCNC: NEGATIVE MG/DL
HCT VFR BLD AUTO: 32.1 % (ref 36.6–50.3)
HGB BLD-MCNC: 10.3 G/DL (ref 12.1–17)
HGB UR QL STRIP: ABNORMAL
IMM GRANULOCYTES # BLD AUTO: 0 K/UL
IMM GRANULOCYTES NFR BLD AUTO: 0 %
KETONES UR QL STRIP.AUTO: NEGATIVE MG/DL
LEUKOCYTE ESTERASE UR QL STRIP.AUTO: NEGATIVE
LYMPHOCYTES # BLD: 2.6 K/UL (ref 0.8–3.5)
LYMPHOCYTES NFR BLD: 11 % (ref 12–49)
MAGNESIUM SERPL-MCNC: 2.4 MG/DL (ref 1.6–2.4)
MCH RBC QN AUTO: 28.1 PG (ref 26–34)
MCHC RBC AUTO-ENTMCNC: 32.1 G/DL (ref 30–36.5)
MCV RBC AUTO: 87.7 FL (ref 80–99)
METAMYELOCYTES NFR BLD MANUAL: 1 %
MONOCYTES # BLD: 0.7 K/UL (ref 0–1)
MONOCYTES NFR BLD: 3 % (ref 5–13)
MUCOUS THREADS URNS QL MICRO: ABNORMAL /LPF
MYELOCYTES NFR BLD MANUAL: 1 %
NEUTS SEG # BLD: 19.6 K/UL (ref 1.8–8)
NEUTS SEG NFR BLD: 84 % (ref 32–75)
NITRITE UR QL STRIP.AUTO: NEGATIVE
NRBC # BLD: 0.02 K/UL (ref 0–0.01)
NRBC BLD-RTO: 0.1 PER 100 WBC
PH UR STRIP: 5 [PH]
PLATELET # BLD AUTO: 281 K/UL (ref 150–400)
PMV BLD AUTO: 9.8 FL (ref 8.9–12.9)
POTASSIUM SERPL-SCNC: 3.9 MMOL/L (ref 3.5–5.1)
PROCALCITONIN SERPL-MCNC: 1.56 NG/ML
PROT UR STRIP-MCNC: NEGATIVE MG/DL
RBC # BLD AUTO: 3.66 M/UL (ref 4.1–5.7)
RBC #/AREA URNS HPF: ABNORMAL /HPF (ref 0–5)
RBC MORPH BLD: ABNORMAL
RBC MORPH BLD: ABNORMAL
SODIUM SERPL-SCNC: 135 MMOL/L (ref 136–145)
SP GR UR REFRACTOMETRY: 1 (ref 1–1.03)
SPECIAL REQUESTS,SREQ: ABNORMAL
THERAPEUTIC RANGE,PTTT: ABNORMAL SEC (ref 82–109)
THERAPEUTIC RANGE,PTTT: ABNORMAL SEC (ref 82–109)
UROBILINOGEN UR QL STRIP.AUTO: 1 EU/DL (ref 0.2–1)
WBC # BLD AUTO: 23.3 K/UL (ref 4.1–11.1)
WBC URNS QL MICRO: ABNORMAL /HPF (ref 0–4)

## 2022-08-22 PROCEDURE — 74011000258 HC RX REV CODE- 258: Performed by: EMERGENCY MEDICINE

## 2022-08-22 PROCEDURE — 74011250637 HC RX REV CODE- 250/637: Performed by: NURSE PRACTITIONER

## 2022-08-22 PROCEDURE — 36415 COLL VENOUS BLD VENIPUNCTURE: CPT

## 2022-08-22 PROCEDURE — 80162 ASSAY OF DIGOXIN TOTAL: CPT

## 2022-08-22 PROCEDURE — 74011000250 HC RX REV CODE- 250: Performed by: HOSPITALIST

## 2022-08-22 PROCEDURE — 85730 THROMBOPLASTIN TIME PARTIAL: CPT

## 2022-08-22 PROCEDURE — 74011250637 HC RX REV CODE- 250/637: Performed by: INTERNAL MEDICINE

## 2022-08-22 PROCEDURE — 74011000258 HC RX REV CODE- 258: Performed by: INTERNAL MEDICINE

## 2022-08-22 PROCEDURE — 74011250636 HC RX REV CODE- 250/636: Performed by: INTERNAL MEDICINE

## 2022-08-22 PROCEDURE — 74011250636 HC RX REV CODE- 250/636: Performed by: HOSPITALIST

## 2022-08-22 PROCEDURE — 99232 SBSQ HOSP IP/OBS MODERATE 35: CPT | Performed by: UROLOGY

## 2022-08-22 PROCEDURE — 84145 PROCALCITONIN (PCT): CPT

## 2022-08-22 PROCEDURE — 99233 SBSQ HOSP IP/OBS HIGH 50: CPT | Performed by: INTERNAL MEDICINE

## 2022-08-22 PROCEDURE — 85025 COMPLETE CBC W/AUTO DIFF WBC: CPT

## 2022-08-22 PROCEDURE — 74011000250 HC RX REV CODE- 250: Performed by: EMERGENCY MEDICINE

## 2022-08-22 PROCEDURE — 83735 ASSAY OF MAGNESIUM: CPT

## 2022-08-22 PROCEDURE — 65270000029 HC RM PRIVATE

## 2022-08-22 PROCEDURE — 86140 C-REACTIVE PROTEIN: CPT

## 2022-08-22 PROCEDURE — 81001 URINALYSIS AUTO W/SCOPE: CPT

## 2022-08-22 PROCEDURE — 80048 BASIC METABOLIC PNL TOTAL CA: CPT

## 2022-08-22 RX ORDER — DILTIAZEM HYDROCHLORIDE 60 MG/1
120 CAPSULE, EXTENDED RELEASE ORAL DAILY
Status: DISCONTINUED | OUTPATIENT
Start: 2022-08-22 | End: 2022-08-26 | Stop reason: HOSPADM

## 2022-08-22 RX ADMIN — MELATONIN TAB 5 MG 5 MG: 5 TAB at 21:54

## 2022-08-22 RX ADMIN — HEPARIN SODIUM 28 UNITS/KG/HR: 10000 INJECTION, SOLUTION INTRAVENOUS at 01:02

## 2022-08-22 RX ADMIN — MEROPENEM 1 G: 1 INJECTION, POWDER, FOR SOLUTION INTRAVENOUS at 17:56

## 2022-08-22 RX ADMIN — SODIUM CHLORIDE, PRESERVATIVE FREE 10 ML: 5 INJECTION INTRAVENOUS at 21:54

## 2022-08-22 RX ADMIN — MEROPENEM 1 G: 1 INJECTION, POWDER, FOR SOLUTION INTRAVENOUS at 09:23

## 2022-08-22 RX ADMIN — MEROPENEM 1 G: 1 INJECTION, POWDER, FOR SOLUTION INTRAVENOUS at 01:05

## 2022-08-22 RX ADMIN — SODIUM CHLORIDE, PRESERVATIVE FREE 10 ML: 5 INJECTION INTRAVENOUS at 07:56

## 2022-08-22 RX ADMIN — HEPARIN SODIUM 28 UNITS/KG/HR: 10000 INJECTION, SOLUTION INTRAVENOUS at 15:11

## 2022-08-22 RX ADMIN — HEPARIN SODIUM 28 UNITS/KG/HR: 10000 INJECTION, SOLUTION INTRAVENOUS at 07:12

## 2022-08-22 RX ADMIN — METOPROLOL TARTRATE 12.5 MG: 25 TABLET, FILM COATED ORAL at 08:22

## 2022-08-22 RX ADMIN — METOPROLOL TARTRATE 12.5 MG: 25 TABLET, FILM COATED ORAL at 21:53

## 2022-08-22 RX ADMIN — SODIUM CHLORIDE, PRESERVATIVE FREE 10 ML: 5 INJECTION INTRAVENOUS at 15:13

## 2022-08-22 RX ADMIN — DIGOXIN 0.25 MG: 250 TABLET ORAL at 08:22

## 2022-08-22 RX ADMIN — DILTIAZEM HYDROCHLORIDE 5 MG/HR: 5 INJECTION, SOLUTION INTRAVENOUS at 07:13

## 2022-08-22 RX ADMIN — SODIUM CHLORIDE, PRESERVATIVE FREE 10 ML: 5 INJECTION INTRAVENOUS at 15:11

## 2022-08-22 RX ADMIN — DILTIAZEM HYDROCHLORIDE 120 MG: 60 CAPSULE, EXTENDED RELEASE ORAL at 10:12

## 2022-08-22 RX ADMIN — HEPARIN SODIUM 28 UNITS/KG/HR: 10000 INJECTION, SOLUTION INTRAVENOUS at 23:06

## 2022-08-22 NOTE — PROGRESS NOTES
[facility] CARDIOLOGY  CARDIOLOGY PROGRESS NOTE    HISTORY OF PRESENT ILLNESS:  Elma Garcia is a 47y.o. year-old male with past medical history significant for morbid obesity, DVT, lymphedema, ? DM who was evaluated today due to new onset AF. Patient presented with scrotal pain and was found to be in AF. No complaints of chest pain, shortness of breath, CVA, PAD, HTN. Was found to have elevated blood sugar on admit. 8/19: Patient was seen and examined in the ER. Complaining of scrotal pain. Denies chest pain or discomfort. 8/20: Patient seen and examined in ICU. Endorses back pain and ongoing scrotal pain/edema. Denies chest pain or dyspnea. Remains on diltiazem and heparin drips. 8/22: Patient was seen and examined in the ICU. Sleepy but arousable. Remains on Cardizem gtt this morning will transition to PO Cardizem. Records from hospital admission course thus far reviewed. Telemetry reviewed. AF 80s       INPATIENT MEDICATIONS:  Home medications reviewed.     Current Facility-Administered Medications:     melatonin tablet 5 mg, 5 mg, Oral, QHS PRN, Shaniqua Cheney MD, 5 mg at 08/21/22 2113    [COMPLETED] meropenem (MERREM) 1 g in 0.9% sodium chloride 20 mL IV syringe, 1 g, IntraVENous, NOW, 1 g at 08/21/22 1018 **FOLLOWED BY** meropenem (MERREM) 1 g in 0.9% sodium chloride (MBP/ADV) 50 mL MBP, 1 g, IntraVENous, Q8H, Eric Cevallos MD, Last Rate: 16.7 mL/hr at 08/22/22 0105, 1 g at 08/22/22 0105    metoprolol tartrate (LOPRESSOR) tablet 12.5 mg, 12.5 mg, Oral, Q12H, Polo Orlando, FNP, 12.5 mg at 08/22/22 1101    heparin 25,000 units in D5W 250 ml infusion, 18-36 Units/kg/hr (Adjusted), IntraVENous, TITRATE, Walter Gilliam MD, Last Rate: 37.3 mL/hr at 08/22/22 0712, 28 Units/kg/hr at 08/22/22 0712    heparin (porcine) 1,000 unit/mL injection 10,000 Units, 10,000 Units, IntraVENous, PRN, 10,000 Units at 08/20/22 0154 **OR** heparin (porcine) 1,000 unit/mL injection 5,000 Units, 5,000 Units, IntraVENous, PRN, Irene Lynne MD, 5,000 Units at 08/21/22 2230    oxyCODONE IR (ROXICODONE) tablet 5 mg, 5 mg, Oral, Q4H PRN, Irene Lynne MD, 5 mg at 08/21/22 1509    digoxin (LANOXIN) tablet 0.25 mg, 0.25 mg, Oral, DAILY, Jacklyn Urban NP, 0.25 mg at 08/22/22 5796    dilTIAZem (CARDIZEM) 125 mg in 0.9% sodium chloride 125 mL ([id number]), 0-15 mg/hr, IntraVENous, TITRATE, Charity Do MD, Last Rate: 5 mL/hr at 08/22/22 0713, 5 mg/hr at 08/22/22 0713    sodium chloride (NS) flush 5-40 mL, 5-40 mL, IntraVENous, Q8H, Irene Lynne MD, 10 mL at 08/22/22 0756    sodium chloride (NS) flush 5-40 mL, 5-40 mL, IntraVENous, PRN, Irene Lynne MD    acetaminophen (TYLENOL) tablet 650 mg, 650 mg, Oral, Q6H PRN, 650 mg at 08/21/22 2112 **OR** acetaminophen (TYLENOL) suppository 650 mg, 650 mg, Rectal, Q6H PRN, Irene Lynne MD    ondansetron (ZOFRAN ODT) tablet 4 mg, 4 mg, Oral, Q8H PRN **OR** ondansetron (ZOFRAN) injection 4 mg, 4 mg, IntraVENous, Q6H PRN, Irene Lynne MD     ALLERGIES:  Allergies reviewed with the patient,No Known Allergies . FAMILY HISTORY:  Family history reviewed. Not significant. SOCIAL HISTORY:  Notable for no  tobacco use, no heavy alcohol or illicit drug use. REVIEW OF SYSTEMS:  Complete review of systems performed, pertinents noted above, all other systems are negative. PHYSICAL EXAMINATION:  Vital sign assessment reveal a blood pressure of  120/70  and pulse rate of  75. Cardiovascular exam has a heart with a irregular rate and rhythm, normal S1 and S2. No murmur present. There are no rubs or gallops. Good peripheral pulses. No jugular venous distension. No carotid bruits are present. Respiratory exam reveals clear lung fields, no rales or rhonchi. Gastrointestinal exam has soft, nontender abdomen with normal bowel sounds. Lymphatic exam reveals chronic lymphedema and no varicosities. Chronic hyperpigmented skin changes. Neurologic exam is nonfocal.  Musculoskeletal exam is notable for a normal gait. Recent labs results and imaging reviewed. Notable findings include   Lab Results   Component Value Date/Time    WBC 23.3 (H) 08/22/2022 04:40 AM    HGB 10.3 (L) 08/22/2022 04:40 AM    HCT 32.1 (L) 08/22/2022 04:40 AM    PLATELET 437 93/74/7626 04:40 AM    MCV 87.7 08/22/2022 04:40 AM     Lab Results   Component Value Date/Time    TSH 4.74 (H) 08/18/2022 07:32 PM    T4, Free 1.2 08/21/2022 10:45 AM      Lab Results   Component Value Date/Time    CK 29 (L) 08/18/2022 07:32 PM     .       Case was discussed with Dr. Moe Ruiz and our impression and recommendations are as follows:      1 Atrial fibrillation with rapid rate:   Continue  heparin and dig 0.25 mg po daily. BP acceptable despite sepsis on admission. Continue low dose beta blocker and will add Cardizem  mg daily. Echo pending. HR up to 130 is acceptable considering ongoing sepsis. 2.  DVT: Chronic lymphedema per patient. Unclear history. Venous duplex negative. Will need chronic AC. 3.  Super morbid obesity: Educated on diet and lifestyle modifications. Thank you for involving us in the care of this patient. Please do not hesitate to call if additional questions arise. Alex Ramírez NP  8/22/2022       Dr. Kate Arreguin  Electrophysiologist    [facility]  [address] [place] [address].    [address] [place] [address]  [phone]

## 2022-08-22 NOTE — PROGRESS NOTES
Hospitalist Progress Note    Subjective:   Daily Progress Note: 8/22/2022 12:55 PM    Admission HPI/Hx (per Dr. Mian Ruiz):  47year old morbidly obese male presents to the emergency room complaining of swelling of scrotal area with severe pain. Presented yesterday with same complaint, was placed on seymour catheter. States sill with severe pain and swelling, worsening with pain. On exam and US scrotum did not reveal any evidence of abscess. Seymour catheter with urine output, UA today with large blood and leukocyte esterase. .  On evaluation he is found to have atrial fibrillation with tachycardia. Even though not having any temperature he looks warm and flushed. Denies any chest pain, palpitations, cough or trouble breathing. Denies any headache. Complains of still having a lot of pain in the genitalia. He is started on IV diltiazem in the emergency room. But he looks volume contracted. And review of care everywhere records 8/2020 he has normal renal functions. He had a surgical excision of the fungating penile mass with incision and drainage of scrotal abscess, at that time had surgical reconstruction done. At the same time had urethral dilation. 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 Patient since then not following back with urology, not sure if he is following with anybody else. After evaluation labs were added, suggestive of elevated sed rate, C-reactive protein and procalcitonin suggestive of infectious etiology. Also elevated uric acid and increased BUN/creatinine suggest volume contraction dehydration. Even though urine analysis has positive large blood with no RBC no evidence of rhabdomyolysis with low CK.  ---------------    Subjective: Scrotal pain. No n/v, cp, sob.     Current Facility-Administered Medications   Medication Dose Route Frequency    dilTIAZem ER (CARDIZEM SR) capsule 120 mg  120 mg Oral DAILY    melatonin tablet 5 mg  5 mg Oral QHS PRN    meropenem (MERREM) 1 g in 0.9% sodium chloride (MBP/ADV) 50 mL MBP  1 g IntraVENous Q8H    metoprolol tartrate (LOPRESSOR) tablet 12.5 mg  12.5 mg Oral Q12H    heparin 25,000 units in D5W 250 ml infusion  18-36 Units/kg/hr (Adjusted) IntraVENous TITRATE    heparin (porcine) 1,000 unit/mL injection 10,000 Units  10,000 Units IntraVENous PRN    Or    heparin (porcine) 1,000 unit/mL injection 5,000 Units  5,000 Units IntraVENous PRN    oxyCODONE IR (ROXICODONE) tablet 5 mg  5 mg Oral Q4H PRN    digoxin (LANOXIN) tablet 0.25 mg  0.25 mg Oral DAILY    dilTIAZem (CARDIZEM) 125 mg in 0.9% sodium chloride 125 mL ([id number])  0-15 mg/hr IntraVENous TITRATE    sodium chloride (NS) flush 5-40 mL  5-40 mL IntraVENous Q8H    sodium chloride (NS) flush 5-40 mL  5-40 mL IntraVENous PRN    acetaminophen (TYLENOL) tablet 650 mg  650 mg Oral Q6H PRN    Or    acetaminophen (TYLENOL) suppository 650 mg  650 mg Rectal Q6H PRN    ondansetron (ZOFRAN ODT) tablet 4 mg  4 mg Oral Q8H PRN    Or    ondansetron (ZOFRAN) injection 4 mg  4 mg IntraVENous Q6H PRN        Review of Systems    Constitutional: No - fever, chills, weight change    HEENT: No - sore throat, sinus pressure, ear pain    Respiratory: No - cough, sob, wheeze, hemoptysis, pleuritic pain    Cardiovascular: No - chest pain, palpitations, extremity edema    Gastrointestinal: No - n/v/d/bleeding/abd pain    Genitourinary: Testicular pain better    Neurological: No - headache, focal weakness, LOC, paresthesia    Skin: No - new rash, discoloration    M/S: No - joint pain, back pain, injury      Objective:     Visit Vitals  /66 (BP 1 Location: Left lower arm, BP Patient Position: At rest)   Pulse 85   Temp 97.6 °F (36.4 °C)   Resp 24   Ht 5' 9\" (1.753 m)   Wt (!) 274.1 kg (604 lb 4.5 oz)   SpO2 97%   BMI 89.24 kg/m²      O2 Device: None (Room air)    Temp (24hrs), Av.1 °F (36.7 °C), Min:97.6 °F (36.4 °C), Max:100 °F (37.8 °C)      701 -  1900  In: 280.9 [I.V.:280.9]  Out: -   08/20 1901 - 08/22 0700  In: 2615.7 [P.O.:950; I.V.:1665.7]  Out: 2900 [Urine:2500]    PHYSICAL EXAM    Constitutional: Morbidly obese (500#), In good spirits    HEENT: Poor dentition    Neck: Supple     Cardiovascular: S1S2, Irreg; Tele A Fib:  rate    Respiratory:  CTA ant bilat     GI: Soft, NT; no rigidity; + bowel sounds    : Siddiqi; pink urine; edematous scrotum    Musculoskeletal: Moving all extremities spontaneously; Neurological:  AxOx2; No new focal weakness;  No tremors    Psychiatric: Appropriate mood; oriented    Skin: Thickened venous stasis dermatitis LE bilat    Vascular: Palpable pulses; + lymphedema      Data Review    Recent Results (from the past 24 hour(s))   PTT    Collection Time: 08/21/22  1:30 PM   Result Value Ref Range    aPTT 66.7 (H) 21.2 - 34.1 sec    aPTT, therapeutic range   82 - 109 sec   PTT    Collection Time: 08/21/22  9:15 PM   Result Value Ref Range    aPTT 61.2 (H) 21.2 - 34.1 sec    aPTT, therapeutic range   82 - 109 sec   PTT    Collection Time: 08/22/22  4:40 AM   Result Value Ref Range    aPTT 84.4 (H) 21.2 - 34.1 sec    aPTT, therapeutic range   82 - 109 sec   MAGNESIUM    Collection Time: 08/22/22  4:40 AM   Result Value Ref Range    Magnesium 2.4 1.6 - 2.4 mg/dL   METABOLIC PANEL, BASIC    Collection Time: 08/22/22  4:40 AM   Result Value Ref Range    Sodium 135 (L) 136 - 145 mmol/L    Potassium 3.9 3.5 - 5.1 mmol/L    Chloride 102 97 - 108 mmol/L    CO2 26 21 - 32 mmol/L    Anion gap 7 5 - 15 mmol/L    Glucose 172 (H) 65 - 100 mg/dL    BUN 16 6 - 20 mg/dL    Creatinine 1.07 0.70 - 1.30 mg/dL    BUN/Creatinine ratio 15 12 - 20      GFR est AA >60 >60 ml/min/1.73m2    GFR est non-AA >60 >60 ml/min/1.73m2    Calcium 7.8 (L) 8.5 - 10.1 mg/dL   CBC WITH AUTOMATED DIFF    Collection Time: 08/22/22  4:40 AM   Result Value Ref Range    WBC 23.3 (H) 4.1 - 11.1 K/uL    RBC 3.66 (L) 4.10 - 5.70 M/uL    HGB 10.3 (L) 12.1 - 17.0 g/dL HCT 32.1 (L) 36.6 - 50.3 %    MCV 87.7 80.0 - 99.0 FL    MCH 28.1 26.0 - 34.0 PG    MCHC 32.1 30.0 - 36.5 g/dL    RDW 15.7 (H) 11.5 - 14.5 %    PLATELET 593 388 - 993 K/uL    MPV 9.8 8.9 - 12.9 FL    NRBC 0.1 (H) 0.0  WBC    ABSOLUTE NRBC 0.02 (H) 0.00 - 0.01 K/uL    NEUTROPHILS 84 (H) 32 - 75 %    LYMPHOCYTES 11 (L) 12 - 49 %    MONOCYTES 3 (L) 5 - 13 %    EOSINOPHILS 0 0 - 7 %    BASOPHILS 0 0 - 1 %    METAMYELOCYTES 1 (H) 0 %    MYELOCYTES 1 (H) 0 %    IMMATURE GRANULOCYTES 0 %    ABS. NEUTROPHILS 19.6 (H) 1.8 - 8.0 K/UL    ABS. LYMPHOCYTES 2.6 0.8 - 3.5 K/UL    ABS. MONOCYTES 0.7 0.0 - 1.0 K/UL    ABS. EOSINOPHILS 0.0 0.0 - 0.4 K/UL    ABS. BASOPHILS 0.0 0.0 - 0.1 K/UL    ABS. IMM. GRANS. 0.0 K/UL    DF Manual      RBC COMMENTS Polychromasia  1+        RBC COMMENTS Macrocytosis  1+       PROCALCITONIN    Collection Time: 08/22/22  4:40 AM   Result Value Ref Range    Procalcitonin 1.56 (H) 0 ng/mL   C REACTIVE PROTEIN, QT    Collection Time: 08/22/22  4:40 AM   Result Value Ref Range    C-Reactive protein 11.00 (H) 0.00 - 0.60 mg/dL   DIGOXIN    Collection Time: 08/22/22  4:40 AM   Result Value Ref Range    Digoxin level 0.5 (L) 0.90 - 2.0 ng/mL   PTT    Collection Time: 08/22/22 10:23 AM   Result Value Ref Range    aPTT 88.6 (H) 21.2 - 34.1 sec    aPTT, therapeutic range   82 - 109 sec       DUPLEX LOWER EXT VENOUS BILAT   Final Result      XR CHEST PORT   Final Result   Limited examination with no acute infiltrate suspected.           Active Problems:    SIRS (systemic inflammatory response syndrome) (HCC) (8/18/2022)      Acute renal failure (ARF) ([address] (8/18/2022)      Testicular swelling (8/18/2022)      Acute renal failure ([facility] (8/18/2022)      Atrial fibrillation with rapid ventricular response ([facility] (8/18/2022)      Assessment/Plan:     Severe Sepsis (clinically) with Scrotal Cellulitis & Proteus Bacteremia (in all 4 bottles) + Proteus UTI  - ID consulted/Mello  - Cont Zosyn/Levaquin  - Urology evaluated. CT cannot be done d/t weight. Ultrasound shows no abscess  - Procal 10 --> 1.56; CRP 13 --> 7 --> 11  - WBC still elevated ~23    A Fib RVR  - Cardizem gtt --> PO  - Hep gtt  - Cadrdiology/Dr. Maxene Carrel on consult    OSIEL; improving  - Received IVF  - Cr 2.2 --> 1.18    Elevated BNP; D/t RVR?  Echo still pending    Chronic Venous Stasis/Lymphedema  - Venous Duplex: Negative for Acute DVT    Anemia with mild thrombocytopenia    Severe Morbid Obesity (500#)    PT/OT    Transfer to /Tele      DVT Prophylaxis: On AC  Code Status: Full Code    Care Plan discussed with:   ________________________________________  Lotus Agosto MD

## 2022-08-22 NOTE — PROGRESS NOTES
UROLOGY Progress Note          [phone]      Daily Progress Note: 8/22/2022      Subjective: The patient is seen for UROLOGIC follow  up. Scrotal cellulitis/ sepsis  He has mild low back pain and scrotal soreness.      Problem List:  Patient Active Problem List   Diagnosis Code    Hypertension I10    DVT (deep venous thrombosis) ([facility]) I82.409    Dysuria R30.0    SIRS (systemic inflammatory response syndrome) ([facility]) R65.10    Acute renal failure (ARF) ([facility]) N17.9    Testicular swelling N50.89    Acute renal failure ([facility]) N17.9    Atrial fibrillation with rapid ventricular response ([facility]) I48.91         Medications reviewed  Current Facility-Administered Medications   Medication Dose Route Frequency    dilTIAZem ER (CARDIZEM SR) capsule 120 mg  120 mg Oral DAILY    melatonin tablet 5 mg  5 mg Oral QHS PRN    meropenem (MERREM) 1 g in 0.9% sodium chloride (MBP/ADV) 50 mL MBP  1 g IntraVENous Q8H    metoprolol tartrate (LOPRESSOR) tablet 12.5 mg  12.5 mg Oral Q12H    heparin 25,000 units in D5W 250 ml infusion  18-36 Units/kg/hr (Adjusted) IntraVENous TITRATE    heparin (porcine) 1,000 unit/mL injection 10,000 Units  10,000 Units IntraVENous PRN    Or    heparin (porcine) 1,000 unit/mL injection 5,000 Units  5,000 Units IntraVENous PRN    oxyCODONE IR (ROXICODONE) tablet 5 mg  5 mg Oral Q4H PRN    digoxin (LANOXIN) tablet 0.25 mg  0.25 mg Oral DAILY    dilTIAZem (CARDIZEM) 125 mg in 0.9% sodium chloride 125 mL (Dnmm4Sag)  0-15 mg/hr IntraVENous TITRATE    sodium chloride (NS) flush 5-40 mL  5-40 mL IntraVENous Q8H    sodium chloride (NS) flush 5-40 mL  5-40 mL IntraVENous PRN    acetaminophen (TYLENOL) tablet 650 mg  650 mg Oral Q6H PRN    Or    acetaminophen (TYLENOL) suppository 650 mg  650 mg Rectal Q6H PRN    ondansetron (ZOFRAN ODT) tablet 4 mg  4 mg Oral Q8H PRN    Or    ondansetron (ZOFRAN) injection 4 mg  4 mg IntraVENous Q6H PRN       Review of Systems:   Review of Systems   Musculoskeletal: Positive for back pain. All other systems reviewed and are negative. Objective:   Physical Exam  Constitutional:       Appearance: Normal appearance. He is obese. HENT:      Head: Normocephalic and atraumatic. Eyes:      Extraocular Movements: Extraocular movements intact. Cardiovascular:      Rate and Rhythm: Normal rate and regular rhythm. Pulmonary:      Effort: Pulmonary effort is normal. No respiratory distress. Breath sounds: No stridor. No wheezing or rhonchi. Abdominal:      General: There is no distension. Palpations: Abdomen is soft. Tenderness: There is no abdominal tenderness. Genitourinary:     Comments: Siddiqi draining clear yellow. Blood at meatus  Anterior scrotum woody/ edematous. Minimally tender,  no erythema  Musculoskeletal:      Cervical back: Normal range of motion. No rigidity. Skin:     General: Skin is warm and dry. Neurological:      General: No focal deficit present. Mental Status: He is alert and oriented to person, place, and time.    Psychiatric:         Mood and Affect: Mood normal.         Behavior: Behavior normal.        Visit Vitals  /70 (BP 1 Location: Left lower arm, BP Patient Position: At rest)   Pulse 92   Temp 97.7 °F (36.5 °C)   Resp 22   Ht 5' 9\" (1.753 m)   Wt (!) 604 lb 4.5 oz (274.1 kg)   SpO2 98%   BMI 89.24 kg/m²         Data Review:       Recent Days:  Recent Labs     08/22/22  0440 08/21/22  0045 08/20/22  0500   WBC 23.3* 23.8* 23.2*   HGB 10.3* 10.4* 10.1*   HCT 32.1* 32.5* 31.7*    233 172       Recent Labs     08/22/22  0440 08/21/22  0045 08/20/22  0500   * 133* 138   K 3.9 3.9 3.8    102 106   CO2 26 26 25   * 177* 162*   BUN 16 22* 33*   CREA 1.07 1.18 1.31*   CA 7.8* 7.5* 7.5*   MG 2.4 2.3 2.5*   ALB  --   --  2.0*   TBILI  --   --  2.1*   ALT  --   --  34           CRP 7.73 from 13.6  Procalcitonin 2.67 from 10.44      Assessment/     Patient Active Problem List   Diagnosis Code Hypertension I10    DVT (deep venous thrombosis) ([facility]) I82.409    Dysuria R30.0    SIRS (systemic inflammatory response syndrome) ([facility]) R65.10    Acute renal failure (ARF) ([facility]) N17.9    Testicular swelling N50.89    Acute renal failure ([facility]) N17.9    Atrial fibrillation with rapid ventricular response ([facility]) I48.91   Scrotal cellulitis, stable to improve. Hematuria with possible seymour irritation. On anticoagulation  D/c seymour when medically stable. External collection device or urinal afterward  Urine cuture growing Proteus mirabilis and blood cultures growing Proteus species. WBC still high.   Continue abx per ID    Adan Villasenor MD

## 2022-08-22 NOTE — PROGRESS NOTES
Problem: Falls - Risk of  Goal: *Absence of Falls  Description: Document Leona Tong Fall Risk and appropriate interventions in the flowsheet.   Outcome: Progressing Towards Goal  Note: Fall Risk Interventions:  Mobility Interventions: Bed/chair exit alarm, OT consult for ADLs, PT Consult for mobility concerns, Patient to call before getting OOB, Strengthening exercises (ROM-active/passive)         Medication Interventions: Assess postural VS orthostatic hypotension, Bed/chair exit alarm, Patient to call before getting OOB, Teach patient to arise slowly    Elimination Interventions: Bed/chair exit alarm, Call light in reach, Toileting schedule/hourly rounds    History of Falls Interventions: Bed/chair exit alarm, Door open when patient unattended, Room close to nurse's station

## 2022-08-22 NOTE — PROGRESS NOTES
Progress Note    Patient: Rannie Krabbe. MRN: [id number]  SSN: [id number]    YOB: 1970  Age: 47 y.o. Sex: male      Admit Date: 8/18/2022    LOS: 4 days     Subjective:   Patient followed for sepsis with scrotal cellulitis and UTI. Urine cuture grew Proteus mirabilis and blood cultures grew Proteus species. He is afebrile with WBC essentially unchanged with increasing CRP, but procal decreasing. He remains in the ICU. Patient currently on IV Zosyn. Patient resting resting comfortably with no scrotal pain until he bathed. Objective:     Vitals:    08/22/22 1012 08/22/22 1100 08/22/22 1200 08/22/22 1300   BP: 116/70 120/80 125/66 124/81   Pulse: 92 75 85 81   Resp: 22 23 24 24   Temp:  97.6 °F (36.4 °C)     SpO2: 98% 98% 97% 97%   Weight:       Height:            Intake and Output:  Current Shift: 08/22 0701 - 08/22 1900  In: 280.9 [I.V.:280.9]  Out: -   Last three shifts: 08/20 1901 - 08/22 0700  In: 2615.7 [P.O.:950; I.V.:1665.7]  Out: 2900 [Urine:2500]    Physical Exam:       Vitals and nursing note reviewed. Constitutional:       Appearance: He is obese. He is ill-appearing. HENT:      Head: Normocephalic and atraumatic. Right Ear: External ear normal.      Left Ear: External ear normal.      Nose: Nose normal.      Mouth/Throat:      Pharynx: Oropharynx is clear. Eyes:      Pupils: Pupils are equal, round, and reactive to light. Cardiovascular:      Rate and Rhythm: Normal rate and regular rhythm. Heart sounds: No murmur heard. Pulmonary:      Effort: Pulmonary effort is normal.      Breath sounds: Normal breath sounds. Abdominal:      General: Bowel sounds are normal. There is no distension. Palpations: Abdomen is soft. Tenderness: There is no abdominal tenderness. There is no right CVA tenderness or left CVA tenderness.    Genitourinary:     Comments: Siddiqi catheter  Moderate scrotal swelling, minimal erythema and tenderness  Local bleeding  Musculoskeletal:         General: Swelling present. Cervical back: Neck supple. Right lower leg: Edema present. Left lower leg: Edema present. Skin:     Findings: No rash. Comments: Hyperkeratotic changes both distal calves likely secondary to chronic venous stasis along with erythema   Neurological:      General: No focal deficit present. Mental Status: He is alert and oriented to person, place, and time. Psychiatric:         Mood and Affect: Mood normal.         Behavior: Behavior normal.         Thought Content: Thought content normal.         Judgment: Judgment normal.     Lab/Data Review:     WBC 23,300     CRP 11.00 <8.73 <7.73 <13.60  Procal 1.56 <1.94 <2.67 <10.44    Urine cultures (8/17) >100,000 col/ml Proteus mirabilis  Blood cultures (8/18) Proteus species    Assessment:     Active Problems:    SIRS (systemic inflammatory response syndrome) ([address] (8/18/2022)      Acute renal failure (ARF) ([address] (8/18/2022)      Testicular swelling (8/18/2022)      Acute renal failure ([address] (8/18/2022)      Atrial fibrillation with rapid ventricular response ([address] (8/18/2022)  Sepsis with leukocytosis, elevated ESR, CRP, procal  Scrotal cellulitis, etiology unclear  UTI with pyuria and bacteria, secondary to Proteus mirabilis and E. coli, on IV Zosyn   Gram negative bacteremia, with Proteus species, presumed secondary to UTI, on IV Zosyn  Chronic lymphedema with venous stasis dermatitis  Morbid obesity    Comment:  There is has been improvement in the clinical appearance of his scrotum. WBC decline is slowed and CRP is increasing. E. Coli susceptibility to Zosyn not reported    Plan:         1. Continue IV Meropenem (concerned about Zosyn failure)  2. Repeat urinalysis  3. In am, repeat CBC, CRP, procal  4.  Follow-up blood cultures    Signed By: Lenore Ruvalcaba MD     August 22, 2022

## 2022-08-23 LAB
ANION GAP SERPL CALC-SCNC: 7 MMOL/L (ref 5–15)
APTT PPP: 84 SEC (ref 21.2–34.1)
BACTERIA SPEC CULT: ABNORMAL
BASOPHILS # BLD: 0.1 K/UL (ref 0–0.1)
BASOPHILS NFR BLD: 0 % (ref 0–1)
BUN SERPL-MCNC: 15 MG/DL (ref 6–20)
BUN/CREAT SERPL: 18 (ref 12–20)
CA-I BLD-MCNC: 8 MG/DL (ref 8.5–10.1)
CHLORIDE SERPL-SCNC: 99 MMOL/L (ref 97–108)
CO2 SERPL-SCNC: 27 MMOL/L (ref 21–32)
CREAT SERPL-MCNC: 0.84 MG/DL (ref 0.7–1.3)
CRP SERPL-MCNC: 9.6 MG/DL (ref 0–0.6)
DIFFERENTIAL METHOD BLD: ABNORMAL
EOSINOPHIL # BLD: 0.1 K/UL (ref 0–0.4)
EOSINOPHIL NFR BLD: 1 % (ref 0–7)
ERYTHROCYTE [DISTWIDTH] IN BLOOD BY AUTOMATED COUNT: 15.9 % (ref 11.5–14.5)
GLUCOSE SERPL-MCNC: 160 MG/DL (ref 65–100)
HCT VFR BLD AUTO: 33.2 % (ref 36.6–50.3)
HGB BLD-MCNC: 10.4 G/DL (ref 12.1–17)
IMM GRANULOCYTES # BLD AUTO: 1.4 K/UL (ref 0–0.04)
IMM GRANULOCYTES NFR BLD AUTO: 7 % (ref 0–0.5)
LYMPHOCYTES # BLD: 2 K/UL (ref 0.8–3.5)
LYMPHOCYTES NFR BLD: 10 % (ref 12–49)
MAGNESIUM SERPL-MCNC: 2.3 MG/DL (ref 1.6–2.4)
MCH RBC QN AUTO: 27.9 PG (ref 26–34)
MCHC RBC AUTO-ENTMCNC: 31.3 G/DL (ref 30–36.5)
MCV RBC AUTO: 89 FL (ref 80–99)
MONOCYTES # BLD: 1.1 K/UL (ref 0–1)
MONOCYTES NFR BLD: 5 % (ref 5–13)
NEUTS SEG # BLD: 16.4 K/UL (ref 1.8–8)
NEUTS SEG NFR BLD: 77 % (ref 32–75)
NRBC # BLD: 0 K/UL (ref 0–0.01)
NRBC BLD-RTO: 0 PER 100 WBC
PLATELET # BLD AUTO: 264 K/UL (ref 150–400)
PMV BLD AUTO: 10.2 FL (ref 8.9–12.9)
POTASSIUM SERPL-SCNC: 3.7 MMOL/L (ref 3.5–5.1)
PROCALCITONIN SERPL-MCNC: 0.7 NG/ML
RBC # BLD AUTO: 3.73 M/UL (ref 4.1–5.7)
SODIUM SERPL-SCNC: 133 MMOL/L (ref 136–145)
SPECIAL REQUESTS,SREQ: ABNORMAL
THERAPEUTIC RANGE,PTTT: ABNORMAL SEC (ref 82–109)
WBC # BLD AUTO: 21.2 K/UL (ref 4.1–11.1)

## 2022-08-23 PROCEDURE — 97165 OT EVAL LOW COMPLEX 30 MIN: CPT

## 2022-08-23 PROCEDURE — 85730 THROMBOPLASTIN TIME PARTIAL: CPT

## 2022-08-23 PROCEDURE — 99232 SBSQ HOSP IP/OBS MODERATE 35: CPT | Performed by: INTERNAL MEDICINE

## 2022-08-23 PROCEDURE — 74011250637 HC RX REV CODE- 250/637: Performed by: HOSPITALIST

## 2022-08-23 PROCEDURE — 85025 COMPLETE CBC W/AUTO DIFF WBC: CPT

## 2022-08-23 PROCEDURE — 97530 THERAPEUTIC ACTIVITIES: CPT

## 2022-08-23 PROCEDURE — 83735 ASSAY OF MAGNESIUM: CPT

## 2022-08-23 PROCEDURE — 74011250637 HC RX REV CODE- 250/637: Performed by: NURSE PRACTITIONER

## 2022-08-23 PROCEDURE — 74011000250 HC RX REV CODE- 250: Performed by: HOSPITALIST

## 2022-08-23 PROCEDURE — 84145 PROCALCITONIN (PCT): CPT

## 2022-08-23 PROCEDURE — 74011250636 HC RX REV CODE- 250/636: Performed by: HOSPITALIST

## 2022-08-23 PROCEDURE — 74011250637 HC RX REV CODE- 250/637: Performed by: INTERNAL MEDICINE

## 2022-08-23 PROCEDURE — 80048 BASIC METABOLIC PNL TOTAL CA: CPT

## 2022-08-23 PROCEDURE — 36415 COLL VENOUS BLD VENIPUNCTURE: CPT

## 2022-08-23 PROCEDURE — 74011000258 HC RX REV CODE- 258: Performed by: INTERNAL MEDICINE

## 2022-08-23 PROCEDURE — 97161 PT EVAL LOW COMPLEX 20 MIN: CPT

## 2022-08-23 PROCEDURE — 97162 PT EVAL MOD COMPLEX 30 MIN: CPT

## 2022-08-23 PROCEDURE — 74011250636 HC RX REV CODE- 250/636: Performed by: INTERNAL MEDICINE

## 2022-08-23 PROCEDURE — 65270000029 HC RM PRIVATE

## 2022-08-23 PROCEDURE — 86140 C-REACTIVE PROTEIN: CPT

## 2022-08-23 RX ADMIN — MEROPENEM 1 G: 1 INJECTION, POWDER, FOR SOLUTION INTRAVENOUS at 01:17

## 2022-08-23 RX ADMIN — MEROPENEM 1 G: 1 INJECTION, POWDER, FOR SOLUTION INTRAVENOUS at 17:19

## 2022-08-23 RX ADMIN — OXYCODONE 5 MG: 5 TABLET ORAL at 22:54

## 2022-08-23 RX ADMIN — METOPROLOL TARTRATE 12.5 MG: 25 TABLET, FILM COATED ORAL at 22:54

## 2022-08-23 RX ADMIN — DIGOXIN 0.25 MG: 250 TABLET ORAL at 09:14

## 2022-08-23 RX ADMIN — MELATONIN TAB 5 MG 5 MG: 5 TAB at 22:54

## 2022-08-23 RX ADMIN — HEPARIN SODIUM 28 UNITS/KG/HR: 10000 INJECTION, SOLUTION INTRAVENOUS at 13:36

## 2022-08-23 RX ADMIN — MEROPENEM 1 G: 1 INJECTION, POWDER, FOR SOLUTION INTRAVENOUS at 09:14

## 2022-08-23 RX ADMIN — SODIUM CHLORIDE, PRESERVATIVE FREE 10 ML: 5 INJECTION INTRAVENOUS at 23:10

## 2022-08-23 RX ADMIN — OXYCODONE 5 MG: 5 TABLET ORAL at 11:38

## 2022-08-23 RX ADMIN — DILTIAZEM HYDROCHLORIDE 120 MG: 60 CAPSULE, EXTENDED RELEASE ORAL at 09:14

## 2022-08-23 RX ADMIN — HEPARIN SODIUM 28 UNITS/KG/HR: 10000 INJECTION, SOLUTION INTRAVENOUS at 22:53

## 2022-08-23 RX ADMIN — HEPARIN SODIUM 28 UNITS/KG/HR: 10000 INJECTION, SOLUTION INTRAVENOUS at 06:41

## 2022-08-23 RX ADMIN — METOPROLOL TARTRATE 12.5 MG: 25 TABLET, FILM COATED ORAL at 09:14

## 2022-08-23 RX ADMIN — SODIUM CHLORIDE, PRESERVATIVE FREE 10 ML: 5 INJECTION INTRAVENOUS at 05:26

## 2022-08-23 RX ADMIN — HEPARIN SODIUM 28 UNITS/KG/HR: 10000 INJECTION, SOLUTION INTRAVENOUS at 09:00

## 2022-08-23 RX ADMIN — SODIUM CHLORIDE, PRESERVATIVE FREE 10 ML: 5 INJECTION INTRAVENOUS at 14:43

## 2022-08-23 NOTE — PROGRESS NOTES
Problem: Falls - Risk of  Goal: *Absence of Falls  Description: Document [place] Fall Risk and appropriate interventions in the flowsheet. Outcome: Progressing Towards Goal  Note: Fall Risk Interventions:  Mobility Interventions: Bed/chair exit alarm, OT consult for ADLs, PT Consult for mobility concerns, Patient to call before getting OOB, Strengthening exercises (ROM-active/passive)         Medication Interventions: Assess postural VS orthostatic hypotension, Bed/chair exit alarm, Patient to call before getting OOB, Teach patient to arise slowly    Elimination Interventions: Bed/chair exit alarm, Call light in reach, Toileting schedule/hourly rounds    History of Falls Interventions: Bed/chair exit alarm, Door open when patient unattended, Room close to nurse's station         Problem: Patient Education: Go to Patient Education Activity  Goal: Patient/Family Education  Outcome: Progressing Towards Goal     Problem: Pressure Injury - Risk of  Goal: *Prevention of pressure injury  Description: Document Kiko Scale and appropriate interventions in the flowsheet.   Outcome: Progressing Towards Goal  Note: Pressure Injury Interventions:  Sensory Interventions: Assess changes in LOC    Moisture Interventions: Absorbent underpads    Activity Interventions: Pressure redistribution bed/mattress(bed type)    Mobility Interventions: PT/OT evaluation    Nutrition Interventions: Document food/fluid/supplement intake    Friction and Shear Interventions: Apply protective barrier, creams and emollients                Problem: Patient Education: Go to Patient Education Activity  Goal: Patient/Family Education  Outcome: Progressing Towards Goal     Problem: Patient Education: Go to Patient Education Activity  Goal: Patient/Family Education  Outcome: Progressing Towards Goal     Problem: Afib Pathway: Day 1  Goal: Off Pathway (Use only if patient is Off Pathway)  Outcome: Progressing Towards Goal  Goal: Activity/Safety  Outcome: Progressing Towards Goal  Goal: Consults, if ordered  Outcome: Progressing Towards Goal  Goal: Diagnostic Test/Procedures  Outcome: Progressing Towards Goal  Goal: Nutrition/Diet  Outcome: Progressing Towards Goal  Goal: Discharge Planning  Outcome: Progressing Towards Goal  Goal: Medications  Outcome: Progressing Towards Goal  Goal: Respiratory  Outcome: Progressing Towards Goal  Goal: Treatments/Interventions/Procedures  Outcome: Progressing Towards Goal  Goal: Psychosocial  Outcome: Progressing Towards Goal  Goal: *Optimal pain control at patient's stated goal  Outcome: Progressing Towards Goal  Goal: *Hemodynamically stable  Outcome: Progressing Towards Goal  Goal: *Stable cardiac rhythm  Outcome: Progressing Towards Goal  Goal: *Lungs clear or at baseline  Outcome: Progressing Towards Goal  Goal: *Labs within defined limits  Outcome: Progressing Towards Goal  Goal: *Describes available resources and support systems  Outcome: Progressing Towards Goal     Problem: Afib Pathway: Day 2  Goal: Off Pathway (Use only if patient is Off Pathway)  Outcome: Progressing Towards Goal  Goal: Activity/Safety  Outcome: Progressing Towards Goal  Goal: Consults, if ordered  Outcome: Progressing Towards Goal  Goal: Diagnostic Test/Procedures  Outcome: Progressing Towards Goal  Goal: Nutrition/Diet  Outcome: Progressing Towards Goal  Goal: Discharge Planning  Outcome: Progressing Towards Goal  Goal: Medications  Outcome: Progressing Towards Goal  Goal: Respiratory  Outcome: Progressing Towards Goal  Goal: Treatments/Interventions/Procedures  Outcome: Progressing Towards Goal  Goal: Psychosocial  Outcome: Progressing Towards Goal  Goal: *Hemodynamically stable  Outcome: Progressing Towards Goal  Goal: *Optimal pain control at patient's stated goal  Outcome: Progressing Towards Goal  Goal: *Stable cardiac rhythm  Outcome: Progressing Towards Goal  Goal: *Lungs clear or at baseline  Outcome: Progressing Towards Goal  Goal: *Describes available resources and support systems  Outcome: Progressing Towards Goal     Problem: Afib Pathway: Day 3  Goal: Off Pathway (Use only if patient is Off Pathway)  Outcome: Progressing Towards Goal  Goal: Activity/Safety  Outcome: Progressing Towards Goal  Goal: Diagnostic Test/Procedures  Outcome: Progressing Towards Goal  Goal: Nutrition/Diet  Outcome: Progressing Towards Goal  Goal: Discharge Planning  Outcome: Progressing Towards Goal  Goal: Medications  Outcome: Progressing Towards Goal  Goal: Respiratory  Outcome: Progressing Towards Goal  Goal: Treatments/Interventions/Procedures  Outcome: Progressing Towards Goal  Goal: Psychosocial  Outcome: Progressing Towards Goal     Problem: Afib: Discharge Outcomes (not in CAT)  Goal: *Hemodynamically stable  Outcome: Progressing Towards Goal  Goal: *Stable cardiac rhythm  Outcome: Progressing Towards Goal  Goal: *Lungs clear or at baseline  Outcome: Progressing Towards Goal  Goal: *Optimal pain control at patient's stated goal  Outcome: Progressing Towards Goal  Goal: *Identifies cardiac risk factors  Outcome: Progressing Towards Goal  Goal: *Verbalizes home exercise program, activity guidelines, cardiac precautions  Outcome: Progressing Towards Goal  Goal: *Verbalizes understanding and describes prescribed diet  Outcome: Progressing Towards Goal  Goal: *Verbalizes understanding and describes medication purposes and frequencies  Outcome: Progressing Towards Goal  Goal: *Anxiety reduced or absent  Outcome: Progressing Towards Goal  Goal: *Understands and describes signs and symptoms to report to providers(Stroke Metric)  Outcome: Progressing Towards Goal  Goal: *Describes follow-up/return visits to physicians  Outcome: Progressing Towards Goal  Goal: *Describes available resources and support systems  Outcome: Progressing Towards Goal  Goal: *Influenza immunization  Outcome: Progressing Towards Goal  Goal: *Pneumococcal immunization  Outcome: Progressing Towards Goal  Goal: *Describes smoking cessation resources  Outcome: Progressing Towards Goal     Problem: General Infection Care Plan (Adult and Pediatric)  Goal: Improvement in signs and symptoms of infection  Outcome: Progressing Towards Goal  Goal: *Optimize nutritional status  Outcome: Progressing Towards Goal     Problem: Patient Education: Go to Patient Education Activity  Goal: Patient/Family Education  Outcome: Progressing Towards Goal     Problem: Risk for Spread of Infection  Goal: Prevent transmission of infectious organism to others  Description: Prevent the transmission of infectious organisms to other patients, staff members, and visitors. Outcome: Progressing Towards Goal     Problem: Falls - Risk of  Goal: *Absence of Falls  Description: Document Earma Lynsey Fall Risk and appropriate interventions in the flowsheet.   Outcome: Progressing Towards Goal  Note: Fall Risk Interventions:  Mobility Interventions: Bed/chair exit alarm, OT consult for ADLs, PT Consult for mobility concerns, Patient to call before getting OOB, Strengthening exercises (ROM-active/passive)         Medication Interventions: Assess postural VS orthostatic hypotension, Bed/chair exit alarm, Patient to call before getting OOB, Teach patient to arise slowly    Elimination Interventions: Bed/chair exit alarm, Call light in reach, Toileting schedule/hourly rounds    History of Falls Interventions: Bed/chair exit alarm, Door open when patient unattended, Room close to nurse's station         Problem: Patient Education: Go to Patient Education Activity  Goal: Patient/Family Education  Outcome: Progressing Towards Goal

## 2022-08-23 NOTE — PROGRESS NOTES
Hospitalist Progress Note    Subjective:   Daily Progress Note: 8/23/2022 8:50 AM    Hospital Course: Patient is a 47year old morbidly obese male presents to the emergency room complaining of swelling of scrotal area with severe pain on 8/18/2022. Presented yesterday with same complaint, was placed on seymour catheter. States sill with severe pain and swelling, worsening with pain. On exam and US scrotum did not reveal any evidence of abscess. Seymour catheter with urine output, UA today with large blood and leukocyte esterase. .  On evaluation he is found to have atrial fibrillation with tachycardia. He is started on IV diltiazem in the emergency room. But he looks volume contracted. And review of care everywhere records 8/2020 he has normal renal functions. He had a surgical excision of the fungating penile mass with incision and drainage of scrotal abscess, at that time had surgical reconstruction done. At the same time had urethral dilation. 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 Patient since then not following back with urology, not sure if he is following with anybody else. After evaluation labs were added, suggestive of elevated sed rate, C-reactive protein and procalcitonin suggestive of infectious etiology. Also elevated uric acid and increased BUN/creatinine suggest volume contraction dehydration. Even though urine analysis has positive large blood with no RBC no evidence of rhabdomyolysis with low CK. Urine culture and blood cultures positive for protease mirabilis. Infectious disease consulted. Patient remains on IV meropenem. Urology consulted and continue local wound care. Patient initially admitted to the ICU. Transferred of the ICU on 8/22/2022. Was transition to oral diltiazem as heart rate remained stable. Patient placed on a heparin drip for anticoagulation.     Patient will require a bariatric wheelchair as patient's mobility limitation impairs ability to participate in activities such as toileting, dressing, grooming, bathing, and mobility limitation cannot be resolved by cane or walker. Patient is able to safely use a manual wheelchair and his functional mobility deficit can be resolved by the use of a manual wheelchair and patient can maneuver the chair independently and he is able to have the strength to maneuver the wheelchair. Patient also will need a heavy-duty bedside commode due to patient's weight. Patient will need this as he is confined to a single room and unable to utilize regular toilet facilities    Subjective: Patient says that he is ready to go home. He says that he has had 1 large episode of loose stool.     Current Facility-Administered Medications   Medication Dose Route Frequency    dilTIAZem ER (CARDIZEM SR) capsule 120 mg  120 mg Oral DAILY    melatonin tablet 5 mg  5 mg Oral QHS PRN    meropenem (MERREM) 1 g in 0.9% sodium chloride (MBP/ADV) 50 mL MBP  1 g IntraVENous Q8H    metoprolol tartrate (LOPRESSOR) tablet 12.5 mg  12.5 mg Oral Q12H    heparin 25,000 units in D5W 250 ml infusion  18-36 Units/kg/hr (Adjusted) IntraVENous TITRATE    heparin (porcine) 1,000 unit/mL injection 10,000 Units  10,000 Units IntraVENous PRN    Or    heparin (porcine) 1,000 unit/mL injection 5,000 Units  5,000 Units IntraVENous PRN    oxyCODONE IR (ROXICODONE) tablet 5 mg  5 mg Oral Q4H PRN    digoxin (LANOXIN) tablet 0.25 mg  0.25 mg Oral DAILY    dilTIAZem (CARDIZEM) 125 mg in 0.9% sodium chloride 125 mL ([id number])  0-15 mg/hr IntraVENous TITRATE    sodium chloride (NS) flush 5-40 mL  5-40 mL IntraVENous Q8H    sodium chloride (NS) flush 5-40 mL  5-40 mL IntraVENous PRN    acetaminophen (TYLENOL) tablet 650 mg  650 mg Oral Q6H PRN    Or    acetaminophen (TYLENOL) suppository 650 mg  650 mg Rectal Q6H PRN    ondansetron (ZOFRAN ODT) tablet 4 mg  4 mg Oral Q8H PRN    Or    ondansetron (ZOFRAN) injection 4 mg  4 mg IntraVENous Q6H PRN        Review of Systems  Constitutional: No fevers, No chills, No sweats, No fatigue, + Weakness  Eyes: No redness  Ears, nose, mouth, throat, and face: No nasal congestion, No sore throat, No voice change  Respiratory: No Shortness of Breath, No cough, No wheezing  Cardiovascular: No chest pain, No palpitations, No extremity edema  Gastrointestinal: No nausea, No vomiting, No diarrhea, No abdominal pain  Genitourinary: No frequency, No dysuria, No hematuria  Integument/breast: No skin lesion(s)   Neurological: No Confusion, No headaches, No dizziness      Objective:     Visit Vitals  /72 (BP 1 Location: Left lower arm, BP Patient Position: Semi fowlers; Lying)   Pulse 81   Temp 98.2 °F (36.8 °C)   Resp 20   Ht 5' 9\" (1.753 m)   Wt (!) 274.1 kg (604 lb 4.5 oz)   SpO2 97%   BMI 89.24 kg/m²      O2 Device: None (Room air)    Temp (24hrs), Av °F (36.7 °C), Min:97.3 °F (36.3 °C), Max:98.4 °F (36.9 °C)      No intake/output data recorded.  1901 -  0700  In: 1141.8 [I.V.:1141.8]  Out: 4348 [Urine:2875]    PHYSICAL EXAM:  Constitutional: No acute distress  Skin: Extremities and face reveal no rashes. HEENT: Sclerae anicteric. Extra-occular muscles are intact. No oral ulcers. The neck is supple and no masses. Cardiovascular: Regular rate and rhythm. Respiratory:  Clear breath sounds bilaterally with no wheezes, rales, or rhonchi. GI: Abdomen nondistended, morbidly obese, soft, and nontender. Normal active bowel sounds. Musculoskeletal: No pitting edema of the lower legs. Able to move all ext  Neurological:  Patient is alert and oriented.  Cranial nerves II-XII grossly intact  Psychiatric: Flat affect      Data Review    Recent Results (from the past 24 hour(s))   PTT    Collection Time: 22 10:23 AM   Result Value Ref Range    aPTT 88.6 (H) 21.2 - 34.1 sec    aPTT, therapeutic range   82 - 109 sec   URINALYSIS W/MICROSCOPIC    Collection Time: 22  4:44 PM   Result Value Ref Range    Color Yellow/Straw      Appearance Clear Clear      Specific gravity 1.005 1.003 - 1.030      pH (UA) 5.0      Protein Negative Negative mg/dL    Glucose Negative Negative mg/dL    Ketone Negative Negative mg/dL    Bilirubin Negative Negative      Blood Trace (A) Negative      Urobilinogen 1.0 0.2 - 1.0 EU/dL    Nitrites Negative Negative      Leukocyte Esterase Negative Negative      WBC 0-4 0 - 4 /hpf    RBC 0-5 0 - 5 /hpf    Bacteria 1+ (A) Negative /hpf    Mucus Trace (A) Negative /lpf    CA Oxalate crystals 1+ (A) Negative   MAGNESIUM    Collection Time: 08/23/22  5:40 AM   Result Value Ref Range    Magnesium 2.3 1.6 - 2.4 mg/dL   METABOLIC PANEL, BASIC    Collection Time: 08/23/22  5:40 AM   Result Value Ref Range    Sodium 133 (L) 136 - 145 mmol/L    Potassium 3.7 3.5 - 5.1 mmol/L    Chloride 99 97 - 108 mmol/L    CO2 27 21 - 32 mmol/L    Anion gap 7 5 - 15 mmol/L    Glucose 160 (H) 65 - 100 mg/dL    BUN 15 6 - 20 mg/dL    Creatinine 0.84 0.70 - 1.30 mg/dL    BUN/Creatinine ratio 18 12 - 20      GFR est AA >60 >60 ml/min/1.73m2    GFR est non-AA >60 >60 ml/min/1.73m2    Calcium 8.0 (L) 8.5 - 10.1 mg/dL   C REACTIVE PROTEIN, QT    Collection Time: 08/23/22  5:40 AM   Result Value Ref Range    C-Reactive protein 9.60 (H) 0.00 - 0.60 mg/dL   PROCALCITONIN    Collection Time: 08/23/22  5:40 AM   Result Value Ref Range    Procalcitonin 0.70 (H) 0 ng/mL   PTT    Collection Time: 08/23/22  5:40 AM   Result Value Ref Range    aPTT 84.0 (H) 21.2 - 34.1 sec    aPTT, therapeutic range   82 - 109 sec       Assessment:   1. Severe sepsis secondary to scrotal cellulitis and Proteus bacteremia/UTI  2. Atrial fibrillation with RVR  3. Acute kidney injury secondary to volume contraction  4. Chronic venous stasis lymphedema  5. Chronic anemia with mild thrombocytopenia  6. Severe morbid obesity    Plan:   1. ID consulted. Urinalysis and culture and blood cultures positive for Proteus mirabilis.   On IV meropenem. Ultrasound showed no abscess. Inflammatory markers trending down. WBC still elevated but stable. Urology also consulted  2. Patient was on a diltiazem drip and switch to p.o. on 8/22/2022. Heart rate well controlled. On heparin drip. 2D echo pending. BNP elevated. Continue digoxin and Cardizem and metoprolol  3. Renal function is stable. Sodium level low this morning at 133. No clinical signs. Continue to monitor closely  4. Venous Dopplers negative for DVT. Most likely would benefit form outpatient lymphedema clinic  5. Hemoglobin 10.3. No signs of active bleeding. Platelet count within normal limits at 281. On a heparin drip. 6.  Complicates all aspects. CBC BMP in the a.m.  7.  CBC BMP inflammatory markers in a.m. Dispo: >48 hours. Need improvement in overall sepsis and clearance from infectious disease for antibiotic management the outpatient setting. Suspect patient may need home health     CODE STATUS Full     DVT prophylaxis: Loveonx  Ulcer prophylaxis: Not indicated     Care Plan discussed with: Patient/Family and Nurse and CM    Total time spent with patient: 34 minutes.

## 2022-08-23 NOTE — PROGRESS NOTES
Progress Note    Patient: Oh Mcdonald. MRN: [id number]  SSN: [id number]    YOB: 1970  Age: 47 y.o. Sex: male      Admit Date: 8/18/2022    LOS: 5 days     Subjective:   Patient followed for sepsis with scrotal cellulitis and UTI. Urine cuture and blood culture grew Proteus mirabilis. He is afebrile with slight decrease in WBC and decreasing CRP and procal.  He has been transferred out of the ICU. Patient currently on IV Meropenem. Objective:     Vitals:    08/22/22 2337 08/23/22 0348 08/23/22 0734 08/23/22 1130   BP: 118/77 115/66 114/72 115/71   Pulse: 91 87 81 87   Resp: 18 18 20 20   Temp: 98.2 °F (36.8 °C) 98.4 °F (36.9 °C) 98.2 °F (36.8 °C) 98.4 °F (36.9 °C)   SpO2: 96% 100% 97% 98%   Weight:       Height:            Intake and Output:  Current Shift: 08/23 0701 - 08/23 1900  In: -   Out: 550 [Urine:550]  Last three shifts: 08/21 1901 - 08/23 0700  In: 1141.8 [I.V.:1141.8]  Out: 5200 [Urine:2875]    Physical Exam:       Vitals and nursing note reviewed. Constitutional:       Appearance: He is obese. He is ill-appearing. Genitourinary:     Comments: Siddiqi catheter  Moderate scrotal swelling, minimal erythema and tenderness, copious drainage or urine around Siddiqi catheter  Local bleeding  Musculoskeletal:         General: Swelling present. Cervical back: Neck supple. Right lower leg: Edema present. Left lower leg: Edema present. Skin:     Findings: No rash. Comments: Hyperkeratotic changes both distal calves likely secondary to chronic venous stasis along with erythema   Neurological:      General: No focal deficit present. Mental Status: He is alert and oriented to person, place, and time. Psychiatric:         Mood and Affect: Mood normal.         Behavior: Behavior normal.         Thought Content:  Thought content normal.         Judgment: Judgment normal.     Lab/Data Review:     WBC 21,200     CRP 9.60 <11.00 <8.73 <7.73 <13.60  Procal 0.70 <1.56 <1.94 <2.67 <10.44    Urine cultures (8/17) >100,000 col/ml Proteus mirabilis  Blood cultures (8/18) Proteus mirabilis    Assessment:     Active Problems:    SIRS (systemic inflammatory response syndrome) ([address] (8/18/2022)      Acute renal failure (ARF) ([address] (8/18/2022)      Testicular swelling (8/18/2022)      Acute renal failure ([address] (8/18/2022)      Atrial fibrillation with rapid ventricular response ([address] (8/18/2022)  Sepsis with leukocytosis, elevated ESR, CRP, procal  Scrotal cellulitis, etiology unclear  UTI with pyuria and bacteria, secondary to Proteus mirabilis and E. coli, on IV Zosyn   Gram negative bacteremia, with Proteus species, presumed secondary to UTI, on IV Zosyn  Chronic lymphedema with venous stasis dermatitis  Morbid obesity    Comment:  Still slow decline in WBC but CRP and procal decreasing. Urinalysis shows resolution of pyuria. Plan:         1. Continue IV Meropenem (concerned about Zosyn failure)  2.  In am, repeat CBC, CRP, procal       Signed By: Anyi Gupta MD     August 23, 2022

## 2022-08-23 NOTE — PROGRESS NOTES
[facility] CARDIOLOGY  CARDIOLOGY PROGRESS NOTE    HISTORY OF PRESENT ILLNESS:  Rekha Fernandez. is a 47y.o. year-old male with past medical history significant for morbid obesity, DVT, lymphedema, ? DM who was evaluated today due to new onset AF. Patient presented with scrotal pain and was found to be in AF. No complaints of chest pain, shortness of breath, CVA, PAD, HTN. Was found to have elevated blood sugar on admit. 8/19: Patient was seen and examined in the ER. Complaining of scrotal pain. Denies chest pain or discomfort. 8/20: Patient seen and examined in ICU. Endorses back pain and ongoing scrotal pain/edema. Denies chest pain or dyspnea. Remains on diltiazem and heparin drips. 8/22: Patient was seen and examined in the ICU. Sleepy but arousable. Remains on Cardizem gtt this morning will transition to PO Cardizem. 8/23: No complaints noted overnight. Records from hospital admission course thus far reviewed. Telemetry reviewed. AF 80s       INPATIENT MEDICATIONS:  Home medications reviewed.     Current Facility-Administered Medications:     dilTIAZem ER (CARDIZEM SR) capsule 120 mg, 120 mg, Oral, DAILY, Jose Angel Garcia NP, 120 mg at 08/23/22 0914    melatonin tablet 5 mg, 5 mg, Oral, QHS PRN, Irene Schmidt MD, 5 mg at 08/22/22 2154    [COMPLETED] meropenem (MERREM) 1 g in 0.9% sodium chloride 20 mL IV syringe, 1 g, IntraVENous, NOW, 1 g at 08/21/22 1018 **FOLLOWED BY** meropenem (MERREM) 1 g in 0.9% sodium chloride (MBP/ADV) 50 mL MBP, 1 g, IntraVENous, Q8H, Yusef Luna MD, Last Rate: 16.7 mL/hr at 08/23/22 0914, 1 g at 08/23/22 0914    metoprolol tartrate (LOPRESSOR) tablet 12.5 mg, 12.5 mg, Oral, Q12H, Brandon Braxton, FNP, 12.5 mg at 08/23/22 0914    heparin 25,000 units in D5W 250 ml infusion, 18-36 Units/kg/hr (Adjusted), IntraVENous, TITRATE, Pradeep Gun, MD, Last Rate: 37.3 mL/hr at 08/23/22 1336, 28 Units/kg/hr at 08/23/22 1336    heparin (porcine) 1,000 unit/mL injection 10,000 Units, 10,000 Units, IntraVENous, PRN, 10,000 Units at 08/20/22 0154 **OR** heparin (porcine) 1,000 unit/mL injection 5,000 Units, 5,000 Units, IntraVENous, PRN, Renita Leon MD, 5,000 Units at 08/21/22 2230    oxyCODONE IR (ROXICODONE) tablet 5 mg, 5 mg, Oral, Q4H PRN, Renita Leon MD, 5 mg at 08/23/22 1138    digoxin (LANOXIN) tablet 0.25 mg, 0.25 mg, Oral, DAILY, Ellie Palomino, NP, 0.25 mg at 08/23/22 0914    dilTIAZem (CARDIZEM) 125 mg in 0.9% sodium chloride 125 mL ([id number]), 0-15 mg/hr, IntraVENous, TITRATE, Clarissa Holm MD, Stopped at 08/22/22 1013    sodium chloride (NS) flush 5-40 mL, 5-40 mL, IntraVENous, Q8H, Renita Leon MD, 10 mL at 08/23/22 0526    sodium chloride (NS) flush 5-40 mL, 5-40 mL, IntraVENous, PRN, Renita Leon MD, 10 mL at 08/22/22 1511    acetaminophen (TYLENOL) tablet 650 mg, 650 mg, Oral, Q6H PRN, 650 mg at 08/21/22 2112 **OR** acetaminophen (TYLENOL) suppository 650 mg, 650 mg, Rectal, Q6H PRN, Renita Leon MD    ondansetron (ZOFRAN ODT) tablet 4 mg, 4 mg, Oral, Q8H PRN **OR** ondansetron (ZOFRAN) injection 4 mg, 4 mg, IntraVENous, Q6H PRN, Renita Leon MD     ALLERGIES:  Allergies reviewed with the patient,No Known Allergies . FAMILY HISTORY:  Family history reviewed. Not significant. SOCIAL HISTORY:  Notable for no  tobacco use, no heavy alcohol or illicit drug use. REVIEW OF SYSTEMS:  Complete review of systems performed, pertinents noted above, all other systems are negative. PHYSICAL EXAMINATION:  Vital sign assessment reveal a blood pressure of  115/71  and pulse rate of  87. Cardiovascular exam has a heart with a irregular rate and rhythm, normal S1 and S2. No murmur present. There are no rubs or gallops. Good peripheral pulses. No jugular venous distension. No carotid bruits are present. Respiratory exam reveals clear lung fields, no rales or rhonchi. Gastrointestinal exam has soft, nontender abdomen with normal bowel sounds. Lymphatic exam reveals chronic lymphedema and no varicosities. Chronic hyperpigmented skin changes. Neurologic exam is nonfocal.  Musculoskeletal exam is notable for a normal gait. Recent labs results and imaging reviewed. Notable findings include   Lab Results   Component Value Date/Time    WBC 21.2 (H) 08/23/2022 05:40 AM    HGB 10.4 (L) 08/23/2022 05:40 AM    HCT 33.2 (L) 08/23/2022 05:40 AM    PLATELET 618 58/27/7955 05:40 AM    MCV 89.0 08/23/2022 05:40 AM     Lab Results   Component Value Date/Time    TSH 4.74 (H) 08/18/2022 07:32 PM    T4, Free 1.2 08/21/2022 10:45 AM      Lab Results   Component Value Date/Time    CK 29 (L) 08/18/2022 07:32 PM     .       Case was discussed with Dr. Selina Dykes and our impression and recommendations are as follows:      1 Atrial fibrillation with rapid rate:   Continue  heparin and dig 0.25 mg po daily. BP acceptable despite sepsis on admission. Continue low dose beta blocker and will add Cardizem  mg daily. Echo pending. HR up to 130 is acceptable considering ongoing sepsis. Check digoxin level in the AM.    2.  DVT: Chronic lymphedema per patient. Unclear history. Venous duplex negative. Will need chronic AC. 3.  Super morbid obesity: Educated on diet and lifestyle modifications. Thank you for involving us in the care of this patient. Please do not hesitate to call if additional questions arise. Meghna Brandt, MEENA  8/23/2022       Dr. Paige Farnsworth  Electrophysiologist    [facility]  [address], [address] [facility]  [phone]

## 2022-08-23 NOTE — PROGRESS NOTES
CM met with patient to discuss DCP, patient recc for IRF by PT/OT, however patient is adamantly declining at this time, stating he will not go to a facility and he wants to return home. Patient states his sister, Gisell López, and niece can help with any needs. Patient states he needs a wheelchair and bedside commode, gave choice for [facility]. Patient agreeable to [facility], no preference given. CM contacted patient's sister, Gisell López [phone], with patient's permission, to confirm DCP. CM and Ms. Gisell López reviewed PT/OT notes, she reported that it seems patient is at his baseline and agreed that she does help however reports she is limited in her abilities to help due to patient's weight. She stated that she would speak with patient regarding IRF. Referrals sent via ngoc for DME and [facility], awaiting orders and documentation from attending physician. CM continues to follow.

## 2022-08-23 NOTE — PROGRESS NOTES
Problem: Mobility Impaired (Adult and Pediatric)  Goal: *Acute Goals and Plan of Care (Insert Text)  Description: Pt stated goal: I want to go home  Pt will be I with LE HEP in 7 days. Pt will perform bed mobility with mod I in 7 days. Pt will perform transfers with mod I in 7 days. Pt will amb -25 feet with LRAD safely with mod I in 7 days. Outcome: Not Met     PHYSICAL THERAPY EVALUATION  Patient: Lizbethyarelymeaghan Mccormack. (55 y.o. male)  Date: 8/23/2022  Primary Diagnosis: SIRS (systemic inflammatory response syndrome) (HCC) [R65.10]  Atrial fibrillation with rapid ventricular response (HCC) [I48.91]  Acute renal failure (HCC) [N17.9]  Testicular swelling [N50.89]  Procedure(s) (LRB):  RIGHT RADICAL ORCHIECTOMY, POSSIBLE INCISION AND DRAINAGE OF SCROTAL ABSCESS (Right)  . (Right)     Precautions: falls    ASSESSMENT  This is a 46 y/o male who came to  [facility]  ED with c/o swelling of scrotal area with severe pain, admitted in ICU on 8/18/22 for SIRS, atrial fibrillation with rapid ventricular response and testicular swelling. Pt moved to floor on 8/22/22. Pls see PMH below for details. Pt received semi-supine in bed , agreeable for PT/OT eval/tx . Pt is A& O x 4 ,  per pt report , he lives with his mother in a apartment on first floor with ramp entrance,  mostly mod I with ADL's, needed assist with donning socks and shoes( aunt, sister, and nieces assist as needed),  mod I for functional transfers/mobility with SPC (short distances) prior to admission.  Other DME owned: shower chair and adjustable bed     Based on the objective data described below, the patient presents with c/o pain at back - 10/10 with mobility ,  decreased strength , -3/5 grossly in  b/l LE , balance deficits , generalized weakness , , decreased activity tolerance and increased need for assist with functional mobility ( needs Gabrielle for rolling  to R side  , Gabrielle/modA for supine >sit transfers, good  static sitting balance, able to tolerate sitting EOB for ~8 minutes,. Pt actively bleeding from  scrotal area, nsg made aware. Pt requires modAx 2 for sit <>stand  transfers, demonstrates good  static  standing balance with support, is able to take few side steps towards the [facility] - 3' with RW, modAx 2 ) . Pt would benefit from continued skilled PT services to address current impairments and improve overall IND and safety with  functional transfers/mobility. Recommend d/c to IRF vs HHPT with family care when medically appropriate. Pt declining for IRF, wants to go home with HHPT. Current Level of Function Impacting Discharge (mobility/balance): Pt requires modAx 2 for transfers/mobility    Functional Outcome Measure: The patient scored 12 on the AM PAC basic mobility outcome measure which is indicative of medium complexity. Other factors to consider for discharge: Decline from functional baseline, time since onset         PLAN :  Recommendations and Planned Interventions: bed mobility training, transfer training, gait training, patient and family training/education, and therapeutic activities      Frequency/Duration: Patient will be followed by physical therapy:  3-5x/week to address goals. Recommendation for discharge: (in order for the patient to meet his/her long term goals)  [address] [facility]     This discharge recommendation:  Has been made in collaboration with the attending provider and/or case management    IF patient discharges home will need the following DME: Bariatric RW         SUBJECTIVE:   Patient stated My back hurts when I move.     OBJECTIVE DATA SUMMARY:   HISTORY:    Past Medical History:   Diagnosis Date    DVT (deep venous thrombosis) ([facility] [address]     Hypertension 7/27/2020     Past Surgical History:   Procedure Laterality Date    HX ORTHOPAEDIC      Ankle    HX ORTHOPAEDIC      Tumors removed from left knee     HX UROLOGICAL      Penile surgery       Personal factors and/or comorbidities impacting plan of care: Home Situation  Home Environment: Apartment (first floor)  Wheelchair Ramp: Yes  One/Two Story Residence: One story  Living Alone: No  Support Systems: Parent(s), Other Family Member(s) (lives with mother, has add'l assist from sister, aunt and nieces as needed)  Patient Expects to be Discharged to[de-identified] Home with home health  Current DME Used/Available at Home: Erleen Candi, straight, Shower chair, Other (comment) (adjustable bed)  Tub or Shower Type: Tub/Shower combination    PLOF: Pt mod I for ADLS/IADLS, mod I for mobility with SPC (short distances) prior to admission. EXAMINATION/PRESENTATION/DECISION MAKING:   Critical Behavior:  Neurologic State: Alert  Orientation Level: Oriented X4  Cognition: Follows commands     Hearing: Auditory  Auditory Impairment: None    Skin: Dry, poor skin turgor. Both lower extremities with thickening of the skin    Range Of Motion:    AROM:Generally decreased, functional    Strength:    Strength: Generally decreased, functional    Tone & Sensation:   Tone: Normal    Sensation: Intact     Coordination:  Coordination: Within functional limits    Functional Mobility:  Bed Mobility:  Rolling: Minimum assistance;Assist x1  Supine to Sit: Minimum assistance; Moderate assistance;Assist x2  Sit to Supine: Moderate assistance;Assist x2  Scooting: Contact guard assistance  Transfers:  Sit to Stand: Moderate assistance;Assist x2  Stand to Sit: Moderate assistance;Assist x2    Balance:   Sitting: Impaired; With support  Sitting - Static: Good (unsupported)  Sitting - Dynamic: Fair (occasional)  Standing: Impaired; With support  Standing - Static: Good;Constant support  Standing - Dynamic : Fair;Constant support  Ambulation/Gait Training:  Distance (ft): 3 Feet (ft) (Able to take few side steps towards the [facility])  Assistive Device: Walker, rolling;Gait belt  Ambulation - Level of Assistance:  Moderate assistance;Assist x2    Functional Measure:    [address] Mobility Inpatient Short Form  How much difficulty does the patient currently have. .. Unable A Lot A Little None   1. Turning over in bed (including adjusting bedclothes, sheets and blankets)? [] 1   [] 2   [x] 3   [] 4   2. Sitting down on and standing up from a chair with arms ( e.g., wheelchair, bedside commode, etc.)   [] 1   [x] 2   [] 3   [] 4   3. Moving from lying on back to sitting on the side of the bed? [] 1   [x] 2   [] 3   [] 4          How much help from another person does the patient currently need. .. Total A Lot A Little None   4. Moving to and from a bed to a chair (including a wheelchair)? [] 1   [x] 2   [] 3   [] 4   5. Need to walk in hospital room? [] 1   [x] 2   [] 3   [] 4   6. Climbing 3-5 steps with a railing? [x] 1   [] 2   [] 3   [] 4   © 2007, Trustees of [employer], under license to [employer]. All rights reserved     Score:  Initial: 12 Most Recent: X (Date: 8/23/22-- )   Interpretation of Tool:  Represents activities that are increasingly more difficult (i.e. Bed mobility, Transfers, Gait). Score 24 23 22-20 19-15 14-10 9-7 6   Modifier CH CI CJ CK CL CM CN          Physical Therapy Evaluation Charge Determination   History Examination Presentation Decision-Making   MEDIUM  Complexity : 1-2 comorbidities / personal factors will impact the outcome/ POC  MEDIUM Complexity : 3 Standardized tests and measures addressing body structure, function, activity limitation and / or participation in recreation  MEDIUM Complexity : Evolving with changing characteristics  Other Functional Measure [facility] 6 medium complexity      Based on the above components, the patient evaluation is determined to be of the following complexity level: MEDIUM    Pain Rating:  Pain at back 10/10 with mobility    Activity Tolerance:   Fair  Please refer to the flowsheet for vital signs taken during this treatment.     After treatment patient left in no apparent distress:   Bed locked and in lowest position Supine in bed, Call bell within reach, and Side rails x 3    COMMUNICATION/EDUCATION:   The patients plan of care was discussed with: Occupational therapist and Registered nurse. Fall prevention education was provided and the patient/caregiver indicated understanding. and Patient/family agree to work toward stated goals and plan of care. PT/OT session occurred together for increased pt's and clinicians' safety.     Thank you for this referral.  Estella Redd   Time Calculation: 33 mins

## 2022-08-23 NOTE — PROGRESS NOTES
Primary Nurse Kate Phillips RN and Megha Anderson RN performed a dual skin assessment on this patient {S SKIN ASSESMENT:85601} bilateral lower legs edema, and scrutum swelling noted.   Kiko score is {KIKO SCALE:45796}

## 2022-08-23 NOTE — PROGRESS NOTES
OCCUPATIONAL THERAPY EVALUATION  Patient: Nova Lucas (55 y.o. male)  Date: 8/23/2022  Primary Diagnosis: SIRS (systemic inflammatory response syndrome) (HCC) [R65.10]  Atrial fibrillation with rapid ventricular response (HCC) [I48.91]  Acute renal failure (HCC) [N17.9]  Testicular swelling [N50.89]  Procedure(s) (LRB):  RIGHT RADICAL ORCHIECTOMY, POSSIBLE INCISION AND DRAINAGE OF SCROTAL ABSCESS (Right)  . (Right)     Precautions: fall risk, contact precautions       ASSESSMENT  Pt is a 47 y.o. male presenting to [facility] with c/o pain and swelling to the scrotal area, admitted 8/18/22 initially to ICU and currently being treated for SIRS, atrial fibrillation with RVR and testicular swelling. Pt received semi-supine in bed upon arrival, AXO x4, and agreeable to OT evaluation. PT also present for increased pt/clinician safety with OOB mobility. Per pt report:   Home Situation  Home Environment: Apartment (first floor)  Wheelchair Ramp: Yes  One/Two Story Residence: One story  Living Alone: No  Support Systems: Parent(s), Other Family Member(s) (lives with mother, has add'l assist from sister, aunt and nieces as needed)  Patient Expects to be Discharged to[de-identified] Home with home health  Current DME Used/Available at Home: [place], straight, Shower chair, Other (comment) (adjustable bed)  Tub or Shower Type: Tub/Shower combination  PLOF: Min A with ADLs (LB dressing), ambulatory short distances with SPC. Pt reports one recent fall from EOB recently, denies any other fall hx. Functional Mobility and Transfers for ADLs:  Bed Mobility:  Rolling: Minimum assistance;Assist x1  Supine to Sit: Minimum assistance; Moderate assistance;Assist x2  Sit to Supine: Moderate assistance;Assist x2  Scooting: Contact guard assistance    Transfers:  Sit to Stand: Moderate assistance;Assist x2  Stand to Sit: Moderate assistance;Assist x2    ADL Intervention and task modifications:  Grooming  Grooming Assistance: Set-up; Stand-by assistance  Position Performed: Long sitting on bed (simulated)    Lower Body Dressing Assistance  Socks: Total assistance (dependent)  Position Performed: Long sitting on bed    Toileting  Toileting Assistance: Total assistance(dependent) (Simulated)    Based on current observations, pt presents with deficits in generalized strength/AROM, bed mobility, dynamic sitting balance, static/dynamic standing balance (see PT note for gait details), functional activity tolerance and pain currently impacting overall performance of ADLs and functional transfers/mobility. Overall, pt tolerates session fair with c/o low back pain (0/10 at rest and 10/10 in sitting) and additional time to complete all mobility in prep for ADLs. Pt would benefit from continued skilled OT services to address current impairments and improve IND and safety with self cares and functional transfers/mobility. Current OT d/c recommendation IRF once medically appropriate, however pt currently declining. Would recommend at a minimum [facility] with family care and would need a bariatric RW and bariatric 3-in-in BSC. Other factors to consider for discharge: family/social support, DME, time since onset, severity of deficits, functional baseline     Patient will benefit from skilled therapy intervention to address the above noted impairments. PLAN :  Recommendations and Planned Interventions: self care training, functional mobility training, therapeutic exercise, balance training, therapeutic activities, endurance activities, neuromuscular re-education, patient education, and family training/education    Frequency/Duration: Patient will be followed by occupational therapy:  3-5x/week to address goals.     Recommendation for discharge: (in order for the patient to meet his/her long term goals)  [address]     This discharge recommendation:  Has been made in collaboration with the attending provider and/or case management    IF patient discharges home will need the following DME: See assessment above       SUBJECTIVE:   Patient stated I'll be able to do better at home.     OBJECTIVE DATA SUMMARY:   HISTORY:   Past Medical History:   Diagnosis Date    DVT (deep venous thrombosis) ([facility] [address]     Hypertension 7/27/2020     Past Surgical History:   Procedure Laterality Date    HX ORTHOPAEDIC      Ankle    HX ORTHOPAEDIC      Tumors removed from left knee     HX UROLOGICAL      Penile surgery       Hand dominance: Right    EXAMINATION OF PERFORMANCE DEFICITS:  Cognitive/Behavioral Status:  Neurologic State: Alert  Orientation Level: Oriented X4  Cognition: Follows commands             Skin: small blood droplets noted from scrotal area (RN in room to assess), generalized LE swelling      Hearing: Auditory  Auditory Impairment: None      Range of Motion:  AROM: Within functional limits                         Strength:  Strength: Generally decreased, functional                Coordination:  Coordination: Within functional limits  Fine Motor Skills-Upper: Left Intact; Right Intact    Gross Motor Skills-Upper: Left Intact; Right Intact    Tone & Sensation:  Tone: Normal  Sensation: Intact                      Balance:  Sitting: Impaired; With support  Sitting - Static: Good (unsupported)  Sitting - Dynamic: Fair (occasional)  Standing: Impaired; With support  Standing - Static: Good;Constant support  Standing - Dynamic : Fair;Constant support    Functional Mobility and Transfers for ADLs:  Bed Mobility:  Rolling: Minimum assistance;Assist x1  Supine to Sit: Minimum assistance; Moderate assistance;Assist x2  Sit to Supine: Moderate assistance;Assist x2  Scooting: Contact guard assistance    Transfers:  Sit to Stand: Moderate assistance;Assist x2  Stand to Sit: Moderate assistance;Assist x2      ADL Intervention and task modifications:       Grooming  Grooming Assistance: Set-up; Stand-by assistance  Position Performed: Long sitting on bed (simulated) Lower Body Dressing Assistance  Socks: Total assistance (dependent)  Position Performed: Long sitting on bed    Toileting  Toileting Assistance: Total assistance(dependent) (Simulated)         Therapeutic Exercise:  Pt would benefit from UE HEP to improve overall UE AROM/strength and can be further educated in next treatment session. Functional Measure:    [employer] AM-PACTM \"6 Clicks\"                                                       Daily Activity Inpatient Short Form  How much help from another person does the patient currently need. .. Total; A Lot A Little None   1. Putting on and taking off regular lower body clothing? [x]  1 []  2 []  3 []  4   2. Bathing (including washing, rinsing, drying)? [x]  1 []  2 []  3 []  4   3. Toileting, which includes using toilet, bedpan or urinal? [x] 1 []  2 []  3 []  4   4. Putting on and taking off regular upper body clothing? []  1 []  2 [x]  3 []  4   5. Taking care of personal grooming such as brushing teeth? []  1 []  2 [x]  3 []  4   6. Eating meals? []  1 []  2 []  3 [x]  4   © 2007, Trustees of [facility] [employer], under license to [employer]. All rights reserved     Score: 13/24     Interpretation of Tool:  Represents clinically-significant functional categories (i.e. Activities of daily living). Percentage of Impairment CH    0%   CI    1-19% CJ    20-39% CK    40-59% CL    60-79% CM    80-99% CN     100%   [facility]  Score 6-24 24 23 20-22 15-19 10-14 7-9 6         Occupational Therapy Evaluation Charge Determination   History Examination Decision-Making   LOW Complexity : Brief history review  LOW Complexity : 1-3 performance deficits relating to physical, cognitive , or psychosocial skils that result in activity limitations and / or participation restrictions  HIGH Complexity : Patient presents with comorbidities that affect occupational performance.  Signifigant modification of tasks or assistance (eg, physical or verbal) with assessment (s) is necessary to enable patient to complete evaluation       Based on the above components, the patient evaluation is determined to be of the following complexity level: LOW   Pain Ratin/10 at rest, 10/10 low back in sitting    Activity Tolerance:   Fair    After treatment patient left in no apparent distress:    Supine in bed, Call bell within reach, and Side rails x 3, bed locked and in lowest position    COMMUNICATION/EDUCATION:   The patients plan of care was discussed with: Physical therapist, Registered nurse, and Case management. Patient/family have participated as able in goal setting and plan of care. and Patient/family agree to work toward stated goals and plan of care. This patients plan of care is appropriate for delegation to [facility]. OT/PT sessions occurred together for increased patient and clinician safety as pt with decreased activity tolerance and requires A of 2 for mobility at this time.      Thank you for this referral.  Arnav Gallego  Time Calculation: 33 mins   Problem: Self Care Deficits Care Plan (Adult)  Goal: *Acute Goals and Plan of Care (Insert Text)  Description: Pt stated goal \"to go home\"  Pt will be Mod I sup <> sit in prep for EOB ADLs  Pt will be Mod I grooming sitting EOB/long sit  Pt will be Mod I UB dressing sitting EOB/long sit   Pt will be Mod A LE dressing sitting EOB/long sit  Pt will be Mod I sit <>  prep for toileting LRAD  Pt will be Min A toileting/toilet transfer/cloth mgmt LRAD  Pt will be IND following UE HEP in prep for self care tasks      Outcome: Not Met

## 2022-08-24 ENCOUNTER — APPOINTMENT (OUTPATIENT)
Dept: NON INVASIVE DIAGNOSTICS | Age: 52
DRG: 872 | End: 2022-08-24
Attending: NURSE PRACTITIONER
Payer: MEDICARE

## 2022-08-24 LAB
ANION GAP SERPL CALC-SCNC: 7 MMOL/L (ref 5–15)
APTT PPP: 95.5 SEC (ref 21.2–34.1)
BASOPHILS # BLD: 0 K/UL (ref 0–0.1)
BASOPHILS NFR BLD: 0 % (ref 0–1)
BUN SERPL-MCNC: 13 MG/DL (ref 6–20)
BUN/CREAT SERPL: 17 (ref 12–20)
CA-I BLD-MCNC: 7.9 MG/DL (ref 8.5–10.1)
CHLORIDE SERPL-SCNC: 100 MMOL/L (ref 97–108)
CO2 SERPL-SCNC: 28 MMOL/L (ref 21–32)
CREAT SERPL-MCNC: 0.77 MG/DL (ref 0.7–1.3)
CRP SERPL-MCNC: 8.85 MG/DL (ref 0–0.6)
DIFFERENTIAL METHOD BLD: ABNORMAL
DIGOXIN SERPL-MCNC: 0.6 NG/ML (ref 0.9–2)
EOSINOPHIL # BLD: 0 K/UL (ref 0–0.4)
EOSINOPHIL NFR BLD: 0 % (ref 0–7)
ERYTHROCYTE [DISTWIDTH] IN BLOOD BY AUTOMATED COUNT: 15.7 % (ref 11.5–14.5)
GLUCOSE BLD STRIP.AUTO-MCNC: 165 MG/DL (ref 65–117)
GLUCOSE SERPL-MCNC: 152 MG/DL (ref 65–100)
HCT VFR BLD AUTO: 32.5 % (ref 36.6–50.3)
HGB BLD-MCNC: 10 G/DL (ref 12.1–17)
IMM GRANULOCYTES # BLD AUTO: 0 K/UL
IMM GRANULOCYTES NFR BLD AUTO: 0 %
LYMPHOCYTES # BLD: 1.9 K/UL (ref 0.8–3.5)
LYMPHOCYTES NFR BLD: 11 % (ref 12–49)
MAGNESIUM SERPL-MCNC: 2.2 MG/DL (ref 1.6–2.4)
MCH RBC QN AUTO: 27.4 PG (ref 26–34)
MCHC RBC AUTO-ENTMCNC: 30.8 G/DL (ref 30–36.5)
MCV RBC AUTO: 89 FL (ref 80–99)
MONOCYTES # BLD: 0.7 K/UL (ref 0–1)
MONOCYTES NFR BLD: 4 % (ref 5–13)
NEUTS BAND NFR BLD MANUAL: 2 % (ref 0–6)
NEUTS SEG # BLD: 14.7 K/UL (ref 1.8–8)
NEUTS SEG NFR BLD: 83 % (ref 32–75)
NRBC # BLD: 0.02 K/UL (ref 0–0.01)
NRBC BLD-RTO: 0.1 PER 100 WBC
PERFORMED BY, TECHID: ABNORMAL
PLATELET # BLD AUTO: 334 K/UL (ref 150–400)
PMV BLD AUTO: 9 FL (ref 8.9–12.9)
POTASSIUM SERPL-SCNC: 4 MMOL/L (ref 3.5–5.1)
PROCALCITONIN SERPL-MCNC: 0.64 NG/ML
RBC # BLD AUTO: 3.65 M/UL (ref 4.1–5.7)
RBC MORPH BLD: ABNORMAL
SODIUM SERPL-SCNC: 135 MMOL/L (ref 136–145)
THERAPEUTIC RANGE,PTTT: ABNORMAL SEC (ref 82–109)
WBC # BLD AUTO: 17.3 K/UL (ref 4.1–11.1)

## 2022-08-24 PROCEDURE — 74011000258 HC RX REV CODE- 258: Performed by: INTERNAL MEDICINE

## 2022-08-24 PROCEDURE — 86140 C-REACTIVE PROTEIN: CPT

## 2022-08-24 PROCEDURE — 74011250636 HC RX REV CODE- 250/636: Performed by: HOSPITALIST

## 2022-08-24 PROCEDURE — 65270000029 HC RM PRIVATE

## 2022-08-24 PROCEDURE — 85025 COMPLETE CBC W/AUTO DIFF WBC: CPT

## 2022-08-24 PROCEDURE — 85730 THROMBOPLASTIN TIME PARTIAL: CPT

## 2022-08-24 PROCEDURE — 51798 US URINE CAPACITY MEASURE: CPT

## 2022-08-24 PROCEDURE — 80048 BASIC METABOLIC PNL TOTAL CA: CPT

## 2022-08-24 PROCEDURE — 36415 COLL VENOUS BLD VENIPUNCTURE: CPT

## 2022-08-24 PROCEDURE — 82962 GLUCOSE BLOOD TEST: CPT

## 2022-08-24 PROCEDURE — 74011250637 HC RX REV CODE- 250/637: Performed by: NURSE PRACTITIONER

## 2022-08-24 PROCEDURE — 83735 ASSAY OF MAGNESIUM: CPT

## 2022-08-24 PROCEDURE — 99232 SBSQ HOSP IP/OBS MODERATE 35: CPT | Performed by: INTERNAL MEDICINE

## 2022-08-24 PROCEDURE — 84145 PROCALCITONIN (PCT): CPT

## 2022-08-24 PROCEDURE — 74011250636 HC RX REV CODE- 250/636: Performed by: INTERNAL MEDICINE

## 2022-08-24 PROCEDURE — 80162 ASSAY OF DIGOXIN TOTAL: CPT

## 2022-08-24 PROCEDURE — 74011000250 HC RX REV CODE- 250: Performed by: HOSPITALIST

## 2022-08-24 PROCEDURE — 74011250637 HC RX REV CODE- 250/637: Performed by: HOSPITALIST

## 2022-08-24 RX ORDER — MEROPENEM 1 G/1
1 INJECTION, POWDER, FOR SOLUTION INTRAVENOUS EVERY 8 HOURS
Qty: 42 G | Refills: 0 | Status: SHIPPED | OUTPATIENT
Start: 2022-08-24 | End: 2022-09-07

## 2022-08-24 RX ADMIN — SODIUM CHLORIDE, PRESERVATIVE FREE 10 ML: 5 INJECTION INTRAVENOUS at 22:00

## 2022-08-24 RX ADMIN — SODIUM CHLORIDE, PRESERVATIVE FREE 10 ML: 5 INJECTION INTRAVENOUS at 06:01

## 2022-08-24 RX ADMIN — MEROPENEM 1 G: 1 INJECTION, POWDER, FOR SOLUTION INTRAVENOUS at 01:19

## 2022-08-24 RX ADMIN — DIGOXIN 0.25 MG: 250 TABLET ORAL at 12:28

## 2022-08-24 RX ADMIN — MEROPENEM 1 G: 1 INJECTION, POWDER, FOR SOLUTION INTRAVENOUS at 18:18

## 2022-08-24 RX ADMIN — OXYCODONE 5 MG: 5 TABLET ORAL at 18:18

## 2022-08-24 RX ADMIN — DILTIAZEM HYDROCHLORIDE 120 MG: 60 CAPSULE, EXTENDED RELEASE ORAL at 10:13

## 2022-08-24 RX ADMIN — OXYCODONE 5 MG: 5 TABLET ORAL at 22:48

## 2022-08-24 RX ADMIN — METOPROLOL TARTRATE 12.5 MG: 25 TABLET, FILM COATED ORAL at 22:49

## 2022-08-24 RX ADMIN — METOPROLOL TARTRATE 12.5 MG: 25 TABLET, FILM COATED ORAL at 10:13

## 2022-08-24 RX ADMIN — HEPARIN SODIUM 28 UNITS/KG/HR: 10000 INJECTION, SOLUTION INTRAVENOUS at 05:58

## 2022-08-24 RX ADMIN — MEROPENEM 1 G: 1 INJECTION, POWDER, FOR SOLUTION INTRAVENOUS at 10:14

## 2022-08-24 NOTE — PROGRESS NOTES
OT treatment attempted at 1:29 pm, however patient reported being sick and declining participation in therapy at this time. Patient educated on importance of working with therapy. Will continue to follow patient and attempt tx at a later time. Thank you!

## 2022-08-24 NOTE — PROGRESS NOTES
Progress Note    Patient: Noemi Barreto MRN: [id number]  SSN: [id number]    YOB: 1970  Age: 47 y.o. Sex: male      Admit Date: 8/18/2022    LOS: 6 days     Subjective:   Patient followed for sepsis with scrotal cellulitis and UTI. Urine cuture and blood culture grew Proteus mirabilis. He is afebrile with slight decrease in WBC and decreasing CRP and procal.  Patient currently on IV Meropenem. Patient resting comfortably but eager to go home. Objective:     Vitals:    08/24/22 0106 08/24/22 0116 08/24/22 0456 08/24/22 0925   BP: (!) 130/55 125/72  115/73   Pulse: 70 81  96   Resp: 18 18  18   Temp: 98.2 °F (36.8 °C) 98.2 °F (36.8 °C)  98.2 °F (36.8 °C)   SpO2: 98% 95%  97%   Weight:   (!) 606 lb 7.8 oz (275.1 kg)    Height:            Intake and Output:  Current Shift: 08/24 0701 - 08/24 1900  In: -   Out: 800 [Urine:800]  Last three shifts: 08/22 1901 - 08/24 0700  In: -   Out: 3050 [Urine:3050]    Physical Exam:       Vitals and nursing note reviewed. Constitutional:       Appearance: He is obese. He is ill-appearing. Genitourinary:     Comments: Siddiqi catheter  Moderate scrotal swelling, minimal erythema and tenderness, no drainage today  Musculoskeletal:         General: Swelling present. Cervical back: Neck supple. Right lower leg: Edema present. Left lower leg: Edema present. Skin:     Findings: No rash. Comments: Hyperkeratotic changes both distal calves likely secondary to chronic venous stasis along with erythema   Neurological:      General: No focal deficit present. Mental Status: He is alert and oriented to person, place, and time. Psychiatric:         Mood and Affect: Mood normal.         Behavior: Behavior normal.         Thought Content:  Thought content normal.         Judgment: Judgment normal.     Lab/Data Review:     WBC 17,300     CRP 8.85 <9.60 <11.00 <8.73 <7.73 <13.60  Procal 0.64 <0.70 <1.56 <1.94 <2.67 <10.44    Urine cultures (8/17) >100,000 col/ml Proteus mirabilis  Blood cultures (8/18) Proteus mirabilis    Assessment:     Active Problems:    SIRS (systemic inflammatory response syndrome) ([address] (8/18/2022)      Acute renal failure (ARF) ([address] (8/18/2022)      Testicular swelling (8/18/2022)      Acute renal failure ([address] (8/18/2022)      Atrial fibrillation with rapid ventricular response ([address] (8/18/2022)  Sepsis with leukocytosis, elevated ESR, CRP, procal  Scrotal cellulitis, etiology unclear, on IV Meropenem  UTI with pyuria and bacteria, secondary to Proteus mirabilis and E. coli, on Day #7 IV antibiotics, Day #4 IV Meropenem   Gram negative bacteremia, with Proteus species, presumed secondary to UTI, on Day #7 IV antibiotics, Day #4 IV Meropenem IV Zosyn  Chronic lymphedema with venous stasis dermatitis  Morbid obesity    Comment:  Greater decline in WBC with slowly deceasing CRP and procal.    Plan:         1. Continue IV Meropenem for 2 more weeks (can be done at home via [facility])  2. Clear for discharge from ID standpoint.     Signed By: Paulino Hernandez MD     August 24, 2022

## 2022-08-24 NOTE — PROGRESS NOTES
Spiritual Care Assessment/Progress Note  [facility]      NAME: Rylee Duran MRN: [id number]  AGE: 47 y.o.  SEX: male  Tenriism Affiliation: No preference   Language: English     8/24/2022     Total Time (in minutes): 17     Spiritual Assessment begun in [address] through conversation with:         [x]Patient        [] Family    [] Friend(s)        Reason for Consult: Initial/Spiritual assessment, patient floor     Spiritual beliefs: (Please include comment if needed)     [] Identifies with a abbi tradition:         [] Supported by a abbi community:            [] Claims no spiritual orientation:           [] Seeking spiritual identity:                [x] Adheres to an individual form of spirituality:           [] Not able to assess:                           Identified resources for coping:      [x] Prayer                               [] Music                  [] Guided Imagery     [x] Family/friends                 [] Pet visits     [] Devotional reading                         [] Unknown     [] Other:                                              Interventions offered during this visit: (See comments for more details)    Patient Interventions: Affirmation of emotions/emotional suffering, Affirmation of abbi, Bridging, Catharsis/review of pertinent events in supportive environment, Coping skills reviewed/reinforced, Iconic (affirming the presence of God/Higher Power), Initial/Spiritual assessment, patient floor, Normalization of emotional/spiritual concerns, Prayer (actual), Life review/legacy           Plan of Care:     [] Support spiritual and/or cultural needs    [] Support AMD and/or advance care planning process      [] Support grieving process   [] Coordinate Rites and/or Rituals    [] Coordination with community clergy   [] No spiritual needs identified at this time   [] Detailed Plan of Care below (See Comments)  [] Make referral to Music Therapy  [] Make referral to Pet Therapy [] Make referral to Addiction services  [] Make referral to [facility]  [] Make referral to Spiritual Care Partner  [] No future visits requested        [x] Contact Spiritual Care for further referrals     Comments:  Visited patient in [facility] unit for initial assessment. Patient was alone during the visit. Patient shared about his readiness to go home and be back with his family and back to his routines. Expressed his belief in God and practice of praying regularly. Shared about his family and his wanting to set a good example to his nephews. Provided supportive presence and chaplaincy education while listening with empathy and exploring his needs. Normalized his emotions and affirmed his health care needs, spiritual resources and familial support. Offered and provided prayer per his request and advised of  availability. Contact chaplains for further referrals.  MERYL LarsenDiv.    can be reached by calling the  at [facility]  [phone]

## 2022-08-24 NOTE — PROGRESS NOTES
[facility] CARDIOLOGY  CARDIOLOGY PROGRESS NOTE    HISTORY OF PRESENT ILLNESS:  Rannie Krabbe. is a 47y.o. year-old male with past medical history significant for morbid obesity, DVT, lymphedema, ? DM who was evaluated today due to new onset AF. Patient presented with scrotal pain and was found to be in AF. No complaints of chest pain, shortness of breath, CVA, PAD, HTN. Was found to have elevated blood sugar on admit. 8/19: Patient was seen and examined in the ER. Complaining of scrotal pain. Denies chest pain or discomfort. 8/20: Patient seen and examined in ICU. Endorses back pain and ongoing scrotal pain/edema. Denies chest pain or dyspnea. Remains on diltiazem and heparin drips. 8/22: Patient was seen and examined in the ICU. Sleepy but arousable. Remains on Cardizem gtt this morning will transition to PO Cardizem. 8/23: No complaints noted overnight.     8/24: Patient is lying in the bed. No acute distress. Records from hospital admission course thus far reviewed. Telemetry reviewed. AF 80s       INPATIENT MEDICATIONS:  Home medications reviewed.     Current Facility-Administered Medications:     [START ON 8/25/2022] rivaroxaban (XARELTO) tablet 20 mg, 20 mg, Oral, DAILY WITH BREAKFAST, Du Fong PA-C    dilTIAZem ER (CARDIZEM SR) capsule 120 mg, 120 mg, Oral, DAILY, Aaron Weeks NP, 120 mg at 08/24/22 1013    melatonin tablet 5 mg, 5 mg, Oral, QHS PRN, Shelley Ya MD, 5 mg at 08/23/22 8150    [COMPLETED] meropenem (MERREM) 1 g in 0.9% sodium chloride 20 mL IV syringe, 1 g, IntraVENous, NOW, 1 g at 08/21/22 1018 **FOLLOWED BY** meropenem (MERREM) 1 g in 0.9% sodium chloride (MBP/ADV) 50 mL MBP, 1 g, IntraVENous, Q8H, Lawson Sarkar MD, Last Rate: 16.7 mL/hr at 08/24/22 1014, 1 g at 08/24/22 1014    metoprolol tartrate (LOPRESSOR) tablet 12.5 mg, 12.5 mg, Oral, Q12H, Caprice Benito, LATIHP, 12.5 mg at 08/24/22 1013    oxyCODONE IR (ROXICODONE) tablet 5 mg, 5 mg, Oral, Q4H PRN, Renita Leon MD, 5 mg at 08/23/22 2254    digoxin (LANOXIN) tablet 0.25 mg, 0.25 mg, Oral, DAILY, Ellie Palomino, NP, 0.25 mg at 08/23/22 0914    sodium chloride (NS) flush 5-40 mL, 5-40 mL, IntraVENous, Q8H, Renita Leon MD, 10 mL at 08/24/22 0601    sodium chloride (NS) flush 5-40 mL, 5-40 mL, IntraVENous, PRN, Renita Leon MD, 10 mL at 08/22/22 1511    acetaminophen (TYLENOL) tablet 650 mg, 650 mg, Oral, Q6H PRN, 650 mg at 08/21/22 2112 **OR** acetaminophen (TYLENOL) suppository 650 mg, 650 mg, Rectal, Q6H PRN, Renita Leon MD    ondansetron (ZOFRAN ODT) tablet 4 mg, 4 mg, Oral, Q8H PRN **OR** ondansetron (ZOFRAN) injection 4 mg, 4 mg, IntraVENous, Q6H PRN, Renita Leon MD     ALLERGIES:  Allergies reviewed with the patient,No Known Allergies . FAMILY HISTORY:  Family history reviewed. Not significant. SOCIAL HISTORY:  Notable for no  tobacco use, no heavy alcohol or illicit drug use. REVIEW OF SYSTEMS:  Complete review of systems performed, pertinents noted above, all other systems are negative. PHYSICAL EXAMINATION:  Vital sign assessment reveal a blood pressure of  115/73  and pulse rate of  96. Cardiovascular exam has a heart with a irregular rate and rhythm, normal S1 and S2. No murmur present. There are no rubs or gallops. Good peripheral pulses. No jugular venous distension. No carotid bruits are present. Respiratory exam reveals clear lung fields, no rales or rhonchi. Gastrointestinal exam has soft, nontender abdomen with normal bowel sounds. Lymphatic exam reveals chronic lymphedema and no varicosities. Chronic hyperpigmented skin changes. Neurologic exam is nonfocal.  Musculoskeletal exam is notable for a normal gait. Recent labs results and imaging reviewed.   Notable findings include   Lab Results   Component Value Date/Time    WBC 17.3 (H) 08/24/2022 05:31 AM    HGB 10.0 (L) 08/24/2022 05:31 AM    HCT 32.5 (L) 08/24/2022 05:31 AM    PLATELET 918 92/63/3860 05:31 AM    MCV 89.0 08/24/2022 05:31 AM     Lab Results   Component Value Date/Time    TSH 4.74 (H) 08/18/2022 07:32 PM    T4, Free 1.2 08/21/2022 10:45 AM      Lab Results   Component Value Date/Time    CK 29 (L) 08/18/2022 07:32 PM     .       Case was discussed with Dr. Uzair Bang  and our impression and recommendations are as follows:      1 Atrial fibrillation with rapid rate:   Discontinue  heparin gtt and transition to Xarelto 20 mg daily. Continue dig 0.25 mg po daily. BP acceptable despite sepsis on admission. Continue low dose beta blocker and will add Cardizem  mg daily. Echo pending. Digoxin 0.6. 2.  DVT: Chronic lymphedema per patient. Unclear history. Venous duplex negative. Continue Xarelto 20 mg daily. 3.  Super morbid obesity: Educated on diet and lifestyle modifications. Thank you for involving us in the care of this patient. Please do not hesitate to call if additional questions arise. Cyndi Camacho, MEENA  8/24/2022       Dr. Delmer Young [facility]  [address], [address] [facility]  [phone]

## 2022-08-24 NOTE — PROGRESS NOTES
Hospitalist Progress Note    Subjective:   Daily Progress Note: 8/24/2022 12:20 PM    Hospital Course:  Karina Velez is a 52-year-old morbidly obese male who presents to the emergency room complaining of swelling of scrotal area with severe pain on 8/18/2022. On exam, US scrotum did not reveal any evidence of abscess. Siddiqi catheter with urine output, UA today with large blood and leukocyte esterase. On evaluation he is found to have atrial fibrillation with tachycardia. He is started on IV diltiazem in the emergency room. Patient placed on a heparin drip for anticoagulation. But he looks volume contracted. And review of care everywhere records 8/2020 he has normal renal functions. He had a surgical excision of the fungating penile mass with incision and drainage of scrotal abscess, at that time had surgical reconstruction done. At the same time had urethral dilation. 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 Patient since then not following back with urology, not sure if he is following with anybody else. After evaluation labs were added, suggestive of elevated sed rate, C-reactive protein and procalcitonin suggestive of infectious etiology. Also elevated uric acid and increased BUN/creatinine suggest volume contraction dehydration. Even though urine analysis has positive large blood with no RBC no evidence of rhabdomyolysis with low CK. Urine culture and blood cultures positive for Protease mirabilis. Infectious disease consulted. Patient remains on IV meropenem. Urology consulted and continue local wound care. Patient initially admitted to the ICU. Transferred out of the ICU on 8/22/2022. Transitioned to oral diltiazem as heart rate remained stable. Transitioned off heparin drip on oral Xarelto.      Patient will require a bariatric wheelchair as patient's mobility limitation impairs ability to participate in activities such as toileting, dressing, grooming, bathing, and mobility limitation cannot be resolved by cane or walker. Patient is able to safely use a manual wheelchair and his functional mobility deficit can be resolved by the use of a manual wheelchair and patient can maneuver the chair independently and he is able to have the strength to maneuver the wheelchair. Patient also will need a heavy-duty bedside commode due to patient's weight. Patient will need this as he is confined to a single room and unable to utilize regular toilet facilities    Subjective:    Patient asking about discharge. Still on heparin drip this morning. Awaiting echo. Discussed barriers to discharge with him.      Current Facility-Administered Medications   Medication Dose Route Frequency    [START ON 8/25/2022] rivaroxaban (XARELTO) tablet 20 mg  20 mg Oral DAILY WITH BREAKFAST    dilTIAZem ER (CARDIZEM SR) capsule 120 mg  120 mg Oral DAILY    melatonin tablet 5 mg  5 mg Oral QHS PRN    meropenem (MERREM) 1 g in 0.9% sodium chloride (MBP/ADV) 50 mL MBP  1 g IntraVENous Q8H    metoprolol tartrate (LOPRESSOR) tablet 12.5 mg  12.5 mg Oral Q12H    oxyCODONE IR (ROXICODONE) tablet 5 mg  5 mg Oral Q4H PRN    digoxin (LANOXIN) tablet 0.25 mg  0.25 mg Oral DAILY    sodium chloride (NS) flush 5-40 mL  5-40 mL IntraVENous Q8H    sodium chloride (NS) flush 5-40 mL  5-40 mL IntraVENous PRN    acetaminophen (TYLENOL) tablet 650 mg  650 mg Oral Q6H PRN    Or    acetaminophen (TYLENOL) suppository 650 mg  650 mg Rectal Q6H PRN    ondansetron (ZOFRAN ODT) tablet 4 mg  4 mg Oral Q8H PRN    Or    ondansetron (ZOFRAN) injection 4 mg  4 mg IntraVENous Q6H PRN        Review of Systems  Constitutional: No fevers, No chills, No sweats, No fatigue, No Weakness  Eyes: No redness  Ears, nose, mouth, throat, and face: No nasal congestion, No sore throat, No voice change  Respiratory: No Shortness of Breath, No cough, No wheezing  Cardiovascular: No chest pain, No palpitations, No extremity edema  Gastrointestinal: No nausea, No vomiting, No diarrhea, No abdominal pain  Genitourinary: No frequency, No dysuria, No hematuria  Integument/breast: No skin lesion(s)   Neurological: No Confusion, No headaches, No dizziness      Objective:     Visit Vitals  /73 (BP 1 Location: Left lower arm, BP Patient Position: At rest)   Pulse 96   Temp 98.2 °F (36.8 °C)   Resp 18   Ht 5' 9\" (1.753 m)   Wt (!) 275.1 kg (606 lb 7.8 oz)   SpO2 97%   BMI 89.56 kg/m²      O2 Device: None (Room air)    Temp (24hrs), Av.2 °F (36.8 °C), Min:97.8 °F (36.6 °C), Max:98.4 °F (36.9 °C)      701 - 1900  In: -   Out: 800 [Urine:800]  1901 -  07  In: -   Out: 0391 [Urine:3050]    PHYSICAL EXAM:  Constitutional: No acute distress  Skin: Extremities and face reveal no rashes. HEENT: Sclerae anicteric. Extra-occular muscles are intact. No oral ulcers. The neck is supple and no masses. Cardiovascular: Regular rate and rhythm. Respiratory:  Clear breath sounds bilaterally with no wheezes, rales, or rhonchi. GI: Abdomen nondistended, soft, and nontender. Normal active bowel sounds. Rectal: Deferred   Musculoskeletal: No pitting edema of the lower legs. Able to move all ext  Neurological:  Patient is alert and oriented.  Cranial nerves II-XII grossly intact  Psychiatric: Mood appears appropriate       Data Review    Recent Results (from the past 24 hour(s))   MAGNESIUM    Collection Time: 22  5:31 AM   Result Value Ref Range    Magnesium 2.2 1.6 - 2.4 mg/dL   METABOLIC PANEL, BASIC    Collection Time: 22  5:31 AM   Result Value Ref Range    Sodium 135 (L) 136 - 145 mmol/L    Potassium 4.0 3.5 - 5.1 mmol/L    Chloride 100 97 - 108 mmol/L    CO2 28 21 - 32 mmol/L    Anion gap 7 5 - 15 mmol/L    Glucose 152 (H) 65 - 100 mg/dL    BUN 13 6 - 20 mg/dL    Creatinine 0.77 0.70 - 1.30 mg/dL    BUN/Creatinine ratio 17 12 - 20      GFR est AA >60 >60 ml/min/1.73m2    GFR est non-AA >60 >60 ml/min/1.73m2    Calcium 7.9 (L) 8.5 - 10.1 mg/dL   CBC WITH AUTOMATED DIFF    Collection Time: 08/24/22  5:31 AM   Result Value Ref Range    WBC 17.3 (H) 4.1 - 11.1 K/uL    RBC 3.65 (L) 4.10 - 5.70 M/uL    HGB 10.0 (L) 12.1 - 17.0 g/dL    HCT 32.5 (L) 36.6 - 50.3 %    MCV 89.0 80.0 - 99.0 FL    MCH 27.4 26.0 - 34.0 PG    MCHC 30.8 30.0 - 36.5 g/dL    RDW 15.7 (H) 11.5 - 14.5 %    PLATELET 758 174 - 039 K/uL    MPV 9.0 8.9 - 12.9 FL    NRBC 0.1 (H) 0.0  WBC    ABSOLUTE NRBC 0.02 (H) 0.00 - 0.01 K/uL    NEUTROPHILS 83 (H) 32 - 75 %    BAND NEUTROPHILS 2 0 - 6 %    LYMPHOCYTES 11 (L) 12 - 49 %    MONOCYTES 4 (L) 5 - 13 %    EOSINOPHILS 0 0 - 7 %    BASOPHILS 0 0 - 1 %    IMMATURE GRANULOCYTES 0 %    ABS. NEUTROPHILS 14.7 (H) 1.8 - 8.0 K/UL    ABS. LYMPHOCYTES 1.9 0.8 - 3.5 K/UL    ABS. MONOCYTES 0.7 0.0 - 1.0 K/UL    ABS. EOSINOPHILS 0.0 0.0 - 0.4 K/UL    ABS. BASOPHILS 0.0 0.0 - 0.1 K/UL    ABS. IMM. GRANS. 0.0 K/UL    DF Manual      RBC COMMENTS Anisocytosis  1+       C REACTIVE PROTEIN, QT    Collection Time: 08/24/22  5:31 AM   Result Value Ref Range    C-Reactive protein 8.85 (H) 0.00 - 0.60 mg/dL   PROCALCITONIN    Collection Time: 08/24/22  5:31 AM   Result Value Ref Range    Procalcitonin 0.64 (H) 0 ng/mL   DIGOXIN    Collection Time: 08/24/22  5:31 AM   Result Value Ref Range    Digoxin level 0.6 (L) 0.90 - 2.0 ng/mL   PTT    Collection Time: 08/24/22  5:31 AM   Result Value Ref Range    aPTT 95.5 (H) 21.2 - 34.1 sec    aPTT, therapeutic range   82 - 109 sec       DUPLEX LOWER EXT VENOUS BILAT   Final Result      XR CHEST PORT   Final Result   Limited examination with no acute infiltrate suspected.           Active Problems:    SIRS (systemic inflammatory response syndrome) ([address] (8/18/2022)      Acute renal failure (ARF) (HCC) (8/18/2022)      Testicular swelling (8/18/2022)      Acute renal failure ([address] (8/18/2022)      Atrial fibrillation with rapid ventricular response ([address] (8/18/2022)        Assessment/Plan:     1. Severe sepsis secondary to scrotal cellulitis and Proteus bacteremia/UTI  - ID consulted  - Urinalysis and culture and blood cultures positive for Proteus mirabilis  - On IV meropenem  - Ultrasound showed no abscess  - Inflammatory markers trending down  - WBC still elevated but stable  - Urology also consulted    2. Atrial fibrillation with RVR  - Off cardizem drip  - Transitioned to oral cardizem  - Off heparin drip. Started on Xarelto 20 mg daily.  - BNP elevated  - Continue digoxin and metoprolol  - Echo pending  - Cardiology following    3. Acute kidney injury secondary to volume contraction  - Resolved     4. Chronic venous stasis lymphedema  -Venous Dopplers negative for DVT  -  Most likely would benefit form outpatient lymphedema clinic    5. Chronic anemia with mild thrombocytopenia  - Hemoglobin 10.0. No signs of active bleeding. 6.  Severe morbid obesity      DVT Prophylaxis: Xarelto  Code Status: Full  POA:    Discharge Barriers:    - Pending Echo   - ID clearance    - On IV abx    Care Plan discussed with: patient, nursing, and daughters at bedside    Total time spent with patient: >35 minutes.

## 2022-08-24 NOTE — PROGRESS NOTES
Problem: Falls - Risk of  Goal: *Absence of Falls  Description: Document Ever Cease Fall Risk and appropriate interventions in the flowsheet. Outcome: Progressing Towards Goal  Note: Fall Risk Interventions:  Mobility Interventions: PT Consult for mobility concerns, PT Consult for assist device competence, Strengthening exercises (ROM-active/passive)         Medication Interventions: Bed/chair exit alarm, Teach patient to arise slowly    Elimination Interventions: Bed/chair exit alarm, Call light in reach, Patient to call for help with toileting needs    History of Falls Interventions: Bed/chair exit alarm, Consult care management for discharge planning, Investigate reason for fall         Problem: Patient Education: Go to Patient Education Activity  Goal: Patient/Family Education  Outcome: Progressing Towards Goal     Problem: Pressure Injury - Risk of  Goal: *Prevention of pressure injury  Description: Document Kiko Scale and appropriate interventions in the flowsheet.   Outcome: Progressing Towards Goal  Note: Pressure Injury Interventions:  Sensory Interventions: Assess changes in LOC, Keep linens dry and wrinkle-free, Minimize linen layers    Moisture Interventions: Absorbent underpads, Apply protective barrier, creams and emollients, Internal/External urinary devices    Activity Interventions: Assess need for specialty bed, Pressure redistribution bed/mattress(bed type), PT/OT evaluation    Mobility Interventions: Assess need for specialty bed, HOB 30 degrees or less    Nutrition Interventions: Document food/fluid/supplement intake    Friction and Shear Interventions: Apply protective barrier, creams and emollients, HOB 30 degrees or less, Lift sheet, Lift team/patient mobility team                Problem: Patient Education: Go to Patient Education Activity  Goal: Patient/Family Education  Outcome: Progressing Towards Goal

## 2022-08-25 ENCOUNTER — APPOINTMENT (OUTPATIENT)
Dept: NON INVASIVE DIAGNOSTICS | Age: 52
DRG: 872 | End: 2022-08-25
Attending: NURSE PRACTITIONER
Payer: MEDICARE

## 2022-08-25 VITALS
DIASTOLIC BLOOD PRESSURE: 58 MMHG | HEIGHT: 69 IN | TEMPERATURE: 97.6 F | SYSTOLIC BLOOD PRESSURE: 113 MMHG | BODY MASS INDEX: 46.65 KG/M2 | WEIGHT: 315 LBS | RESPIRATION RATE: 18 BRPM | HEART RATE: 84 BPM | OXYGEN SATURATION: 94 %

## 2022-08-25 PROBLEM — N49.9 CELLULITIS OF MALE GENITALIA: Status: ACTIVE | Noted: 2022-08-25

## 2022-08-25 PROBLEM — N39.0 UTI (URINARY TRACT INFECTION): Status: ACTIVE | Noted: 2022-08-25

## 2022-08-25 PROBLEM — R78.81 BACTEREMIA: Status: ACTIVE | Noted: 2022-08-25

## 2022-08-25 LAB
ANION GAP SERPL CALC-SCNC: 5 MMOL/L (ref 5–15)
BASOPHILS # BLD: 0 K/UL (ref 0–0.1)
BASOPHILS NFR BLD: 0 % (ref 0–1)
BUN SERPL-MCNC: 13 MG/DL (ref 6–20)
BUN/CREAT SERPL: 19 (ref 12–20)
CA-I BLD-MCNC: 8.1 MG/DL (ref 8.5–10.1)
CHLORIDE SERPL-SCNC: 102 MMOL/L (ref 97–108)
CO2 SERPL-SCNC: 28 MMOL/L (ref 21–32)
CREAT SERPL-MCNC: 0.7 MG/DL (ref 0.7–1.3)
CRP SERPL-MCNC: 7.8 MG/DL (ref 0–0.6)
DIFFERENTIAL METHOD BLD: ABNORMAL
ECHO AO DESC DIAM: 2.4 CM
ECHO AO DESCENDING AORTA INDEX: 0.73 CM/M2
ECHO AO ROOT DIAM: 3.7 CM
ECHO AO ROOT INDEX: 1.12 CM/M2
ECHO AV MEAN GRADIENT: 5 MMHG
ECHO AV MEAN VELOCITY: 1.1 M/S
ECHO AV PEAK GRADIENT: 9 MMHG
ECHO AV PEAK VELOCITY: 1.5 M/S
ECHO AV VELOCITY RATIO: 0.87
ECHO AV VTI: 34.7 CM
ECHO EST RA PRESSURE: 15 MMHG
ECHO IVC PROX: 2.8 CM
ECHO LA AREA 4C: 30.7 CM2
ECHO LA DIAMETER INDEX: 1.57 CM/M2
ECHO LA DIAMETER: 5.2 CM
ECHO LA MAJOR AXIS: 7.4 CM
ECHO LA TO AORTIC ROOT RATIO: 1.41
ECHO LV E' LATERAL VELOCITY: 12 CM/S
ECHO LV E' SEPTAL VELOCITY: 8 CM/S
ECHO LV EJECTION FRACTION A2C: 64 %
ECHO LV FRACTIONAL SHORTENING: 35 % (ref 28–44)
ECHO LV INTERNAL DIMENSION DIASTOLE INDEX: 1.54 CM/M2
ECHO LV INTERNAL DIMENSION DIASTOLIC: 5.1 CM (ref 4.2–5.9)
ECHO LV INTERNAL DIMENSION SYSTOLIC INDEX: 1 CM/M2
ECHO LV INTERNAL DIMENSION SYSTOLIC: 3.3 CM
ECHO LV IVSD: 1 CM (ref 0.6–1)
ECHO LV MASS 2D: 188 G (ref 88–224)
ECHO LV MASS INDEX 2D: 56.8 G/M2 (ref 49–115)
ECHO LV POSTERIOR WALL DIASTOLIC: 1 CM (ref 0.6–1)
ECHO LV RELATIVE WALL THICKNESS RATIO: 0.39
ECHO LVOT AV VTI INDEX: 0.73
ECHO LVOT MEAN GRADIENT: 3 MMHG
ECHO LVOT PEAK GRADIENT: 7 MMHG
ECHO LVOT PEAK VELOCITY: 1.3 M/S
ECHO LVOT VTI: 25.4 CM
ECHO MV A VELOCITY: 0.8 M/S
ECHO MV E DECELERATION TIME (DT): 173 MS
ECHO MV E VELOCITY: 0.89 M/S
ECHO MV E/A RATIO: 1.11
ECHO MV E/E' LATERAL: 7.42
ECHO MV E/E' RATIO (AVERAGED): 9.27
ECHO MV E/E' SEPTAL: 11.13
ECHO MV LVOT VTI INDEX: 1.48
ECHO MV MAX VELOCITY: 1.4 M/S
ECHO MV MEAN GRADIENT: 3 MMHG
ECHO MV MEAN VELOCITY: 0.8 M/S
ECHO MV PEAK GRADIENT: 8 MMHG
ECHO MV REGURGITANT PEAK GRADIENT: 64 MMHG
ECHO MV REGURGITANT PEAK VELOCITY: 4 M/S
ECHO MV VTI: 37.6 CM
ECHO RIGHT VENTRICULAR SYSTOLIC PRESSURE (RVSP): 64 MMHG
ECHO TV REGURGITANT MAX VELOCITY: 3.49 M/S
ECHO TV REGURGITANT PEAK GRADIENT: 49 MMHG
EOSINOPHIL # BLD: 0.1 K/UL (ref 0–0.4)
EOSINOPHIL NFR BLD: 1 % (ref 0–7)
ERYTHROCYTE [DISTWIDTH] IN BLOOD BY AUTOMATED COUNT: 15.6 % (ref 11.5–14.5)
ERYTHROCYTE [SEDIMENTATION RATE] IN BLOOD: 106 MM/HR (ref 0–20)
GLUCOSE SERPL-MCNC: 150 MG/DL (ref 65–100)
HCT VFR BLD AUTO: 31.4 % (ref 36.6–50.3)
HGB BLD-MCNC: 9.8 G/DL (ref 12.1–17)
IMM GRANULOCYTES # BLD AUTO: 0.7 K/UL (ref 0–0.04)
IMM GRANULOCYTES NFR BLD AUTO: 5 % (ref 0–0.5)
LYMPHOCYTES # BLD: 2 K/UL (ref 0.8–3.5)
LYMPHOCYTES NFR BLD: 13 % (ref 12–49)
MAGNESIUM SERPL-MCNC: 2.2 MG/DL (ref 1.6–2.4)
MCH RBC QN AUTO: 27.5 PG (ref 26–34)
MCHC RBC AUTO-ENTMCNC: 31.2 G/DL (ref 30–36.5)
MCV RBC AUTO: 88.2 FL (ref 80–99)
MONOCYTES # BLD: 1.1 K/UL (ref 0–1)
MONOCYTES NFR BLD: 7 % (ref 5–13)
NEUTS SEG # BLD: 11.2 K/UL (ref 1.8–8)
NEUTS SEG NFR BLD: 74 % (ref 32–75)
NRBC # BLD: 0.02 K/UL (ref 0–0.01)
NRBC BLD-RTO: 0.1 PER 100 WBC
PLATELET # BLD AUTO: 331 K/UL (ref 150–400)
PMV BLD AUTO: 9.1 FL (ref 8.9–12.9)
POTASSIUM SERPL-SCNC: 4.4 MMOL/L (ref 3.5–5.1)
PROCALCITONIN SERPL-MCNC: 0.3 NG/ML
RBC # BLD AUTO: 3.56 M/UL (ref 4.1–5.7)
SODIUM SERPL-SCNC: 135 MMOL/L (ref 136–145)
WBC # BLD AUTO: 15.1 K/UL (ref 4.1–11.1)

## 2022-08-25 PROCEDURE — 74011250637 HC RX REV CODE- 250/637: Performed by: NURSE PRACTITIONER

## 2022-08-25 PROCEDURE — 36415 COLL VENOUS BLD VENIPUNCTURE: CPT

## 2022-08-25 PROCEDURE — 85652 RBC SED RATE AUTOMATED: CPT

## 2022-08-25 PROCEDURE — 85025 COMPLETE CBC W/AUTO DIFF WBC: CPT

## 2022-08-25 PROCEDURE — 97535 SELF CARE MNGMENT TRAINING: CPT

## 2022-08-25 PROCEDURE — 74011250636 HC RX REV CODE- 250/636: Performed by: INTERNAL MEDICINE

## 2022-08-25 PROCEDURE — 74011000258 HC RX REV CODE- 258: Performed by: INTERNAL MEDICINE

## 2022-08-25 PROCEDURE — 97530 THERAPEUTIC ACTIVITIES: CPT

## 2022-08-25 PROCEDURE — 84145 PROCALCITONIN (PCT): CPT

## 2022-08-25 PROCEDURE — C8929 TTE W OR WO FOL WCON,DOPPLER: HCPCS

## 2022-08-25 PROCEDURE — 87529 HSV DNA AMP PROBE: CPT

## 2022-08-25 PROCEDURE — 74011000250 HC RX REV CODE- 250: Performed by: HOSPITALIST

## 2022-08-25 PROCEDURE — 80048 BASIC METABOLIC PNL TOTAL CA: CPT

## 2022-08-25 PROCEDURE — 86140 C-REACTIVE PROTEIN: CPT

## 2022-08-25 PROCEDURE — 99232 SBSQ HOSP IP/OBS MODERATE 35: CPT | Performed by: INTERNAL MEDICINE

## 2022-08-25 PROCEDURE — 83735 ASSAY OF MAGNESIUM: CPT

## 2022-08-25 PROCEDURE — 74011250637 HC RX REV CODE- 250/637: Performed by: STUDENT IN AN ORGANIZED HEALTH CARE EDUCATION/TRAINING PROGRAM

## 2022-08-25 PROCEDURE — 74011250637 HC RX REV CODE- 250/637: Performed by: HOSPITALIST

## 2022-08-25 RX ORDER — METOPROLOL TARTRATE 25 MG/1
12.5 TABLET, FILM COATED ORAL EVERY 12 HOURS
Qty: 30 TABLET | Refills: 1 | Status: SHIPPED | OUTPATIENT
Start: 2022-08-25 | End: 2022-09-24

## 2022-08-25 RX ORDER — DIGOXIN 250 MCG
0.25 TABLET ORAL DAILY
Qty: 30 TABLET | Refills: 0 | Status: SHIPPED | OUTPATIENT
Start: 2022-08-26 | End: 2022-09-25

## 2022-08-25 RX ORDER — OXYCODONE HYDROCHLORIDE 5 MG/1
5 TABLET ORAL
Qty: 12 TABLET | Refills: 0 | Status: SHIPPED | OUTPATIENT
Start: 2022-08-25 | End: 2022-08-28

## 2022-08-25 RX ORDER — DILTIAZEM HYDROCHLORIDE 120 MG/1
120 CAPSULE, EXTENDED RELEASE ORAL DAILY
Qty: 30 CAPSULE | Refills: 1 | Status: SHIPPED | OUTPATIENT
Start: 2022-08-26 | End: 2022-09-25

## 2022-08-25 RX ADMIN — METOPROLOL TARTRATE 12.5 MG: 25 TABLET, FILM COATED ORAL at 10:41

## 2022-08-25 RX ADMIN — DIGOXIN 0.25 MG: 250 TABLET ORAL at 10:41

## 2022-08-25 RX ADMIN — MEROPENEM 1 G: 1 INJECTION, POWDER, FOR SOLUTION INTRAVENOUS at 01:20

## 2022-08-25 RX ADMIN — DILTIAZEM HYDROCHLORIDE 120 MG: 60 CAPSULE, EXTENDED RELEASE ORAL at 10:42

## 2022-08-25 RX ADMIN — RIVAROXABAN 20 MG: 20 TABLET, FILM COATED ORAL at 10:41

## 2022-08-25 RX ADMIN — OXYCODONE 5 MG: 5 TABLET ORAL at 12:00

## 2022-08-25 RX ADMIN — MEROPENEM 1 G: 1 INJECTION, POWDER, FOR SOLUTION INTRAVENOUS at 10:43

## 2022-08-25 RX ADMIN — SODIUM CHLORIDE, PRESERVATIVE FREE 10 ML: 5 INJECTION INTRAVENOUS at 06:29

## 2022-08-25 RX ADMIN — PERFLUTREN 2 ML: 6.52 INJECTION, SUSPENSION INTRAVENOUS at 12:34

## 2022-08-25 RX ADMIN — SODIUM CHLORIDE, PRESERVATIVE FREE 10 ML: 5 INJECTION INTRAVENOUS at 13:17

## 2022-08-25 NOTE — PROGRESS NOTES
Problem: Falls - Risk of  Goal: *Absence of Falls  Description: Document Olga Lidia Bradley Fall Risk and appropriate interventions in the flowsheet. Outcome: Progressing Towards Goal  Note: Fall Risk Interventions:  Mobility Interventions: Communicate number of staff needed for ambulation/transfer         Medication Interventions: Bed/chair exit alarm    Elimination Interventions: Call light in reach, Patient to call for help with toileting needs    History of Falls Interventions: Bed/chair exit alarm, Door open when patient unattended, Room close to nurse's station         Problem: Pressure Injury - Risk of  Goal: *Prevention of pressure injury  Description: Document Kiko Scale and appropriate interventions in the flowsheet.   Outcome: Progressing Towards Goal  Note: Pressure Injury Interventions:  Sensory Interventions: Pressure redistribution bed/mattress (bed type)    Moisture Interventions: Absorbent underpads, Internal/External urinary devices, Minimize layers    Activity Interventions: Pressure redistribution bed/mattress(bed type)    Mobility Interventions: Pressure redistribution bed/mattress (bed type)    Nutrition Interventions: Document food/fluid/supplement intake    Friction and Shear Interventions: Apply protective barrier, creams and emollients, Minimize layers                Problem: General Infection Care Plan (Adult and Pediatric)  Goal: Improvement in signs and symptoms of infection  Outcome: Progressing Towards Goal  Goal: *Optimize nutritional status  Outcome: Progressing Towards Goal

## 2022-08-25 NOTE — PROGRESS NOTES
CM was informed patient will need two more weeks of IV merrem. He has a midline that he can get the abx through. Met with the patient at the bedside to discuss plans and see if he changed his mind about rehab. Patient adamantly refuses rehab and would not even let CM discuss it. He stated he lives at home with his mom but his niece would be able to administer the IV abx. CM reached out to patient's niece, Matteo Mathew, at [phone], and she confirmed she can administer. She will be to the hospital at 12:30 to be taught, although she stated she is a nurse and is able to do this. Referral has been sent to [facility]. THANH awaiting acceptance and insurance verification. Patient does not have a secondary insurance and will likely have an out of pocket cost.     DC pending arrangement of IV abx.

## 2022-08-25 NOTE — PROGRESS NOTES
Patient has been accepted with [facility] for IV [facility]x. He has agreed to the [facility] cost and his family has been taught how to administer the abx. He has also been accepted with [facility] for skilled services. DC orders have been sent via [facility]. Patient also being discharged on Xarelto.  provided patient with the free trial card for a 30 day supply. Patient is cleared from CM standpoint. Medicare pt has received, reviewed, and signed 2nd IM letter informing them of their right to appeal the discharge. Signed copied has been placed on pt bedside chart.

## 2022-08-25 NOTE — PROGRESS NOTES
Problem: Self Care Deficits Care Plan (Adult)  Goal: *Acute Goals and Plan of Care (Insert Text)  Description: Pt stated goal \"to go home\"  Pt will be Mod I sup <> sit in prep for EOB ADLs  Pt will be Mod I grooming sitting EOB/long sit  Pt will be Mod I UB dressing sitting EOB/long sit   Pt will be Mod A LE dressing sitting EOB/long sit  Pt will be Mod I sit <>  prep for toileting LRAD  Pt will be Min A toileting/toilet transfer/cloth mgmt LRAD  Pt will be IND following UE HEP in prep for self care tasks      Outcome: Progressing Towards Goal   OCCUPATIONAL THERAPY TREATMENT  Patient: Lonnie Cheung (55 y.o. male)  Date: 8/25/2022  Diagnosis: SIRS (systemic inflammatory response syndrome) ([facility]) [R65.10]  Atrial fibrillation with rapid ventricular response ([facility]) [I48.91]  Acute renal failure ([facility]) [N17.9]  Testicular swelling [N50.89] SIRS (systemic inflammatory response syndrome) ([facility])  Procedure(s) (LRB):  RIGHT RADICAL ORCHIECTOMY, POSSIBLE INCISION AND DRAINAGE OF SCROTAL ABSCESS (Right)  . (Right)    Precautions: FALL  Chart, occupational therapy assessment, plan of care, and goals were reviewed. ASSESSMENT  Patient continues with skilled OT services and is progressing slowly towards goals. Patient received semi supine in bed upon RUEDA/PTA arrival and agreeable to OT session. Pt reported that he needed cleaning up, pt able to roll L/R using bed rail with additional time d/t large body habits. TA for residual toileting hygiene of bowel leakage. In supine, pt min A for UB dressing (new gown) with assistance over shoulders d/t decreased rogers UE ROM. Patient completed bed mobility with CGA, supine to sit with min A, sit to supine with mod A x2 assisting LE to EOB using bed rail with additional time. Pt req'd max A x2 for STS x2 for balance without AD, pt standing for ~30 seconds with mod A x2 d/t pain in back with 1 increased rest break. Pt returned to supine with call bell and all needs met. Nursing notified of pain level. Patient would benefit from continued skilled Occupational services while at [facility] in order to address current impairments and improve IND and safety with self care and functional transfer/mobility. Current OT d/c recommendation to [facility] with family care once medically appropriate. Current Level of Function Impacting Discharge (ADLs): min A /set-up for UB dressing and TA for toileting hygiene    Other factors to consider for discharge: Time of onset, medical prognosis/diagnosis, severity of deficits, PLOF, functional baseline, home environment, and family support          PLAN :  Patient continues to benefit from skilled intervention to address the above impairments. Continue treatment per established plan of care. to address goals. Recommend with staff: Bed mobility to relieve pressure sores     Recommend next OT session: EOB grooming    Recommendation for discharge: (in order for the patient to meet his/her long term goals)  [facility] with family care    This discharge recommendation:  Has been made in collaboration with the attending provider and/or case management    IF patient discharges home will need the following DME: RW,3 in 1 BSC       SUBJECTIVE:   Patient stated My back hurts.     OBJECTIVE DATA SUMMARY:   Cognitive/Behavioral Status:  Neurologic State: Alert  Orientation Level: Appropriate for age  Cognition: Follows commands             Functional Mobility and Transfers for ADLs:  Bed Mobility:  Rolling: Contact guard assistance  Supine to Sit: Minimum assistance  Sit to Supine: Moderate assistance;Assist x2    Transfers:  Sit to Stand: Maximum assistance;Assist x2          Balance:  Sitting: Impaired; With support  Sitting - Static: Fair (occasional)  Sitting - Dynamic: Fair (occasional)  Standing: Impaired; With support  Standing - Static: Constant support; Fair  Standing - Dynamic : Constant support;Poor    ADL Intervention:     Upper Body Dressing Assistance  Dressing Assistance: Minimum assistance;Set-up  Hospital Gown: Minimum  assistance;Set-up         Toileting  Toileting Assistance: Total assistance(dependent)  Bowel Hygiene: Total assistance (dependent) (residual bowel leakage)       Pain:  10/10 back area    Activity Tolerance:   Poor, requires frequent rest breaks, and observed SOB with activity  Please refer to the flowsheet for vital signs taken during this treatment. After treatment patient left in no apparent distress:   Bed locked and in lowest position, Supine in bed, Call bell within reach, Bed / chair alarm activated, and Side rails x 3    COMMUNICATION/COLLABORATION:   The patients plan of care was discussed with: Physical therapy assistant and Registered nurse.  Co-tx with PTA for increased pt/clinician safety with OOB activity     MOHIT Hernandez  Time Calculation: 33 mins

## 2022-08-25 NOTE — PROGRESS NOTES
PHYSICAL THERAPY TREATMENT  Patient: Jennifer Philip (55 y.o. male)  Date: 8/25/2022  Diagnosis: SIRS (systemic inflammatory response syndrome) ([facility]) [R65.10]  Atrial fibrillation with rapid ventricular response ([facility]) [I48.91]  Acute renal failure ([facility]) [N17.9]  Testicular swelling [N50.89] SIRS (systemic inflammatory response syndrome) ([facility])  Procedure(s) (LRB):  RIGHT RADICAL ORCHIECTOMY, POSSIBLE INCISION AND DRAINAGE OF SCROTAL ABSCESS (Right)  . (Right)    Precautions:    Chart, physical therapy assessment, plan of care and goals were reviewed. ASSESSMENT  Patient continues with skilled PT services and is progressing towards goals. Patient supine in bed upon approach and agreed to therapy session today but reported that he felt he needed to be cleaned up first. Patient then was SBA for rolling to Lt side but found to not have any BM, ( see OT note for more details) Patient then was BSA for rolling to Rt side of bed where patient was min A for supine>sit where patient demonstrated fair sitting balance with occasional need for support and patient self supporting himself. Patient noted to have SOB while sitting EOB but was able to tolerate sitting EOB for 1-15 minutes. Patient then had 2 SST with HHA x2 with first attempt at max Ax2 and patient not able to stand upright 2/2 to bed being to high ( be could not be lowered any more). Patient was able to stand fully upright at max Ax2 with HHA. Patient able to stand for 30 seconds to 1 minute with mod Ax2 HHA to keep balance as patient was able to take 1 step backwards to bed for repositioning back into bed with max Ax2 stand>sit. Patient then was mod Ax2 for sit>supine back into bed and mod Ax2 for repositioning. Patient then left supine in bed with call bell within reach and all needs meet. Nursing alerted to how patient performed in session today.      Current Level of Function Impacting Discharge (mobility/balance): impaired balance general weakness, poor activity tolerance    Other factors to consider for discharge: PLOF, assistance at home, level of deficits, acute medicals state, obesity. PLAN :  Patient continues to benefit from skilled intervention to address the above impairments. Continue treatment per established plan of care. to address goals. Recommendation for discharge: (in order for the patient to meet his/her long term goals)  [facility],[address]     This discharge recommendation:  Has been made in collaboration with the attending provider and/or case management    IF patient discharges home will need the following DME: bariatric RW       SUBJECTIVE:   Patient stated I haven't been up in a few days.     OBJECTIVE DATA SUMMARY:   Critical Behavior:  Neurologic State: Alert  Orientation Level: Appropriate for age  Cognition: Follows commands     Functional Mobility Training:  Bed Mobility:  Rolling: Contact guard assistance  Supine to Sit: Minimum assistance  Sit to Supine: Moderate assistance;Assist x2  Transfers:  Sit to Stand: Maximum assistance;Assist x2  Stand to Sit: Maximum assistance;Assist x2  Balance:  Sitting: Impaired; With support  Sitting - Static: Fair (occasional)  Sitting - Dynamic: Fair (occasional)  Standing: Impaired; With support  Standing - Static: Constant support; Fair  Standing - Dynamic : Constant support;Poor  Ambulation/Gait Training:  Distance (ft): 1 Feet (ft)  Assistive Device: Gait belt  Ambulation - Level of Assistance: Moderate assistance;Assist x2  Pain Rating:  10/10 in back    Activity Tolerance:   Bed locked and in lowest position Fair, requires rest breaks, and observed SOB with activity  Please refer to the flowsheet for vital signs taken during this treatment.     After treatment patient left in no apparent distress:   Bed returned to lowest position, Supine in bed, Call bell within reach, and Side rails x 3    COMMUNICATION/COLLABORATION:   The patients plan of care was discussed with: Occupational therapy assistant and Registered nurse. Treatment occurred with OT due to patient requiring Ax2 for mobility and safety. Problem: Mobility Impaired (Adult and Pediatric)  Goal: *Acute Goals and Plan of Care (Insert Text)  Description: Pt stated goal: I want to go home  Pt will be I with LE HEP in 7 days. Pt will perform bed mobility with mod I in 7 days. Pt will perform transfers with mod I in 7 days. Pt will amb -25 feet with LRAD safely with mod I in 7 days.    Outcome: Progressing Towards Goal       Lennie Crenshaw PTA   Time Calculation: 35 mins

## 2022-08-25 NOTE — DISCHARGE SUMMARY
Hospitalist Discharge Summary     Patient ID:    Quincy Riojas  [id number]  47 y.o.  1970    Admit date: 8/18/2022    Discharge date : 8/25/2022    Chronic Diagnoses:    Problem List as of 8/25/2022 Date Reviewed: 8/18/2022            Codes Class Noted - Resolved    UTI (urinary tract infection) ICD-10-CM: N39.0  ICD-9-CM: 599.0  8/25/2022 - Present        Cellulitis of male genitalia ICD-10-CM: N49.9  ICD-9-CM: 608.4  8/25/2022 - Present        Bacteremia ICD-10-CM: R78.81  ICD-9-CM: 790.7  8/25/2022 - Present        * (Principal) SIRS (systemic inflammatory response syndrome) ([facility] ICD-10-CM: R65.10  ICD-9-CM: 995.90  8/18/2022 - Present        Testicular swelling ICD-10-CM: N50.89  ICD-9-CM: 608.86  8/18/2022 - Present        Acute renal failure ([facility] ICD-10-CM: N17.9  ICD-9-CM: 584.9  8/18/2022 - Present        Atrial fibrillation with rapid ventricular response ([facility] ICD-10-CM: I48.91  ICD-9-CM: 427.31  8/18/2022 - Present        Dysuria ICD-10-CM: R30.0  ICD-9-CM: 788.1  9/3/2020 - Present        Hypertension ICD-10-CM: I10  ICD-9-CM: 401.9  7/27/2020 - Present        DVT (deep venous thrombosis) ([facility] ICD-10-CM: I82.409  ICD-9-CM: 453.40  7/27/2020 - Present       22    Final Diagnoses:   Principal Problem:    SIRS (systemic inflammatory response syndrome) ([facility] (8/18/2022)    Active Problems:    Testicular swelling (8/18/2022)      Acute renal failure ([facility] (8/18/2022)      Atrial fibrillation with rapid ventricular response ([facility] (8/18/2022)      UTI (urinary tract infection) (8/25/2022)      Cellulitis of male genitalia (8/25/2022)      Bacteremia (8/25/2022)      Hospital Course:   Haydee Sorensen is a 15-year-old morbidly obese male who presents to the emergency room complaining of swelling of scrotal area with severe pain on 8/18/2022. On exam, US scrotum did not reveal any evidence of abscess.  Siddiqi catheter with urine output, UA today with large blood and leukocyte esterase. On evaluation he is found to have atrial fibrillation with tachycardia. He is started on IV diltiazem in the emergency room. Patient placed on a heparin drip for anticoagulation. But he looks volume contracted. And review of care everywhere records 8/2020 he has normal renal functions. He had a surgical excision of the fungating penile mass with incision and drainage of scrotal abscess, at that time had surgical reconstruction done. At the same time had urethral dilation. 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 Patient since then not following back with urology, not sure if he is following with anybody else. After evaluation labs were added, suggestive of elevated sed rate, C-reactive protein and procalcitonin suggestive of infectious etiology. Also elevated uric acid and increased BUN/creatinine suggest volume contraction dehydration. Even though urine analysis has positive large blood with no RBC no evidence of rhabdomyolysis with low CK. Urine culture and blood cultures positive for Protease mirabilis. Infectious disease consulted. Initially started on IV Zosyn and IV merrem. UTI secondary to Proteus mirabilis and E. Coli. Gram negative bacteremia with Proteus species. Etiology appears to be scrotal cellulitis. Patient remains on IV meropenem alone. Urology consulted and continue local wound care. Patient initially admitted to the ICU. Transferred out of the ICU on 8/22/2022. Transitioned to oral diltiazem as heart rate remained stable. Started on digoxin and beta-blocker. Transitioned off heparin drip on oral Xarelto. Unfortunately, patient is cash paying and Xarelto, Eliquis, and Pradaxa are all about $600 for one month supply. Spoke to cardiology, who is advising PCP follow-up for further recommendations. Will arrange home health and IV antibiotics at discharge. Mid-line placement prior to discharge.  Patient failed voiding trial. Will discharge with seymour catheter and close outpatient follow-up with urology in 1 week. Close outpatient follow-up with ID, urology, and cardiology. Patient will require a bariatric wheelchair as patient's mobility limitation impairs ability to participate in activities such as toileting, dressing, grooming, bathing, and mobility limitation cannot be resolved by cane or walker. Patient is able to safely use a manual wheelchair and his functional mobility deficit can be resolved by the use of a manual wheelchair and patient can maneuver the chair independently and he is able to have the strength to maneuver the wheelchair. Patient also will need a heavy-duty bedside commode due to patient's weight. Patient will need this as he is confined to a single room and unable to utilize regular toilet facilities    Discharge Medications:     Current Discharge Medication List        START taking these medications    Details   digoxin (LANOXIN) 0.25 mg tablet Take 1 Tablet by mouth daily for 30 days. Qty: 30 Tablet, Refills: 0  Start date: 8/26/2022, End date: 9/25/2022      dilTIAZem ER (CARDIZEM SR) 120 mg capsule Take 1 Capsule by mouth daily for 30 days. Qty: 30 Capsule, Refills: 1  Start date: 8/26/2022, End date: 9/25/2022      metoprolol tartrate (LOPRESSOR) 25 mg tablet Take 0.5 Tablets by mouth every twelve (12) hours for 30 days. Qty: 30 Tablet, Refills: 1  Start date: 8/25/2022, End date: 9/24/2022      oxyCODONE IR (ROXICODONE) 5 mg immediate release tablet Take 1 Tablet by mouth every six (6) hours as needed for Pain for up to 3 days. Max Daily Amount: 20 mg.  Qty: 12 Tablet, Refills: 0  Start date: 8/25/2022, End date: 8/28/2022    Associated Diagnoses: Cellulitis of scrotum; Testicular swelling      rivaroxaban (Xarelto) 20 mg tab tablet Take 1 Tablet by mouth daily (with breakfast) for 30 days.   Qty: 30 Tablet, Refills: 0  Start date: 8/25/2022, End date: 9/24/2022      meropenem [facility]) injection 1,000 mg by IntraVENous route every eight (8) hours for 14 days. Indications: complicated skin infection  Qty: 42 g, Refills: 0  Start date: 8/24/2022, End date: 9/7/2022           STOP taking these medications       furosemide (Lasix) 20 mg tablet Comments:   Reason for Stopping: Follow up Care:    1. Florencio Roque, DO in 1-2 weeks. Follow-up Information       Follow up With Specialties Details Why [address] [facility], [place], [place] [facility] Schedule an appointment as soon as possible for a visit today Hospital discharge appointment on September 2, 2022 at 1:00 pm [address]      Nilda Quispe MD Infectious Disease Physician Schedule an appointment as soon as possible for a visit in 2 week(s) Hospital follow-up IV antibiotics [address]      [facility], [address] [facility] Vascular Surgery, Clinical Cardiac Electrophysiology Physician, Cardiovascular Disease Physician, Interventional Cardiology Physician Schedule an appointment as soon as possible for a visit in 3 week(s) Hospital follow-up afib [facility] [address] [id number] [phone]      [address] [facility] [address] [facility] Follow up As needed, If symptoms worsen [address] [place] [address]  [phone]              Patient Follow Up Instructions: Activity: PT/OT per Home Health  Diet:  Cardiac Diet  Wound care: None required    Condition at Discharge:  Stable  __________________________________________________________________    Disposition  Home Health Care [facility]  ____________________________________________________________________    Code Status:  Full Code  ___________________________________________________________________    Discharge Exam:  Patient seen and examined by me on discharge day. Pertinent Findings:    Gen:    Morbidly obese male.  Not in distress  Chest: Clear lungs. Room air. CVS:   Regular rate and rhythm. +S1/S2. No murmur or gallop. Abd:  Soft, not distended, not tender. Active bowel sounds. Ext: Chronic venous changes lower extremities. Neuro:  Alert and oriented x3. CN II-XII grossly intact. Psych: Mood and affect appropriate     CONSULTATIONS: Cardiology, ID, and Urology    Significant Diagnostic Studies:   Recent Results (from the past 24 hour(s))   GLUCOSE, POC    Collection Time: 08/24/22  9:21 PM   Result Value Ref Range    Glucose (POC) 165 (H) 65 - 117 mg/dL    Performed by Carmen Roach    MAGNESIUM    Collection Time: 08/25/22  5:07 AM   Result Value Ref Range    Magnesium 2.2 1.6 - 2.4 mg/dL   METABOLIC PANEL, BASIC    Collection Time: 08/25/22  5:07 AM   Result Value Ref Range    Sodium 135 (L) 136 - 145 mmol/L    Potassium 4.4 3.5 - 5.1 mmol/L    Chloride 102 97 - 108 mmol/L    CO2 28 21 - 32 mmol/L    Anion gap 5 5 - 15 mmol/L    Glucose 150 (H) 65 - 100 mg/dL    BUN 13 6 - 20 mg/dL    Creatinine 0.70 0.70 - 1.30 mg/dL    BUN/Creatinine ratio 19 12 - 20      GFR est AA >60 >60 ml/min/1.73m2    GFR est non-AA >60 >60 ml/min/1.73m2    Calcium 8.1 (L) 8.5 - 10.1 mg/dL   CBC WITH AUTOMATED DIFF    Collection Time: 08/25/22  5:07 AM   Result Value Ref Range    WBC 15.1 (H) 4.1 - 11.1 K/uL    RBC 3.56 (L) 4.10 - 5.70 M/uL    HGB 9.8 (L) 12.1 - 17.0 g/dL    HCT 31.4 (L) 36.6 - 50.3 %    MCV 88.2 80.0 - 99.0 FL    MCH 27.5 26.0 - 34.0 PG    MCHC 31.2 30.0 - 36.5 g/dL    RDW 15.6 (H) 11.5 - 14.5 %    PLATELET 660 287 - 347 K/uL    MPV 9.1 8.9 - 12.9 FL    NRBC 0.1 (H) 0.0  WBC    ABSOLUTE NRBC 0.02 (H) 0.00 - 0.01 K/uL    NEUTROPHILS 74 32 - 75 %    LYMPHOCYTES 13 12 - 49 %    MONOCYTES 7 5 - 13 %    EOSINOPHILS 1 0 - 7 %    BASOPHILS 0 0 - 1 %    IMMATURE GRANULOCYTES 5 (H) 0 - 0.5 %    ABS. NEUTROPHILS 11.2 (H) 1.8 - 8.0 K/UL    ABS. LYMPHOCYTES 2.0 0.8 - 3.5 K/UL    ABS. MONOCYTES 1.1 (H) 0.0 - 1.0 K/UL    ABS.  EOSINOPHILS 0.1 0.0 - 0.4 K/UL ABS. BASOPHILS 0.0 0.0 - 0.1 K/UL    ABS. IMM. GRANS. 0.7 (H) 0.00 - 0.04 K/UL    DF AUTOMATED     C REACTIVE PROTEIN, QT    Collection Time: 08/25/22  5:07 AM   Result Value Ref Range    C-Reactive protein 7.80 (H) 0.00 - 0.60 mg/dL   PROCALCITONIN    Collection Time: 08/25/22  5:07 AM   Result Value Ref Range    Procalcitonin 0.30 (H) 0 ng/mL   SED RATE (ESR)    Collection Time: 08/25/22  5:07 AM   Result Value Ref Range    Sed rate, automated 106 (H) 0 - 20 mm/hr     DUPLEX LOWER EXT VENOUS BILAT   Final Result      XR CHEST PORT   Final Result   Limited examination with no acute infiltrate suspected.               Signed:  Roman Nicolas PA-C  8/25/2022  11:42 AM

## 2022-08-25 NOTE — PROGRESS NOTES
[facility] CARDIOLOGY  CARDIOLOGY PROGRESS NOTE    HISTORY OF PRESENT ILLNESS:  Eduard Bean is a 47y.o. year-old male with past medical history significant for morbid obesity, DVT, lymphedema, ? DM who was evaluated today due to new onset AF. Patient presented with scrotal pain and was found to be in AF. No complaints of chest pain, shortness of breath, CVA, PAD, HTN. Was found to have elevated blood sugar on admit. 8/19: Patient was seen and examined in the ER. Complaining of scrotal pain. Denies chest pain or discomfort. 8/20: Patient seen and examined in ICU. Endorses back pain and ongoing scrotal pain/edema. Denies chest pain or dyspnea. Remains on diltiazem and heparin drips. 8/22: Patient was seen and examined in the ICU. Sleepy but arousable. Remains on Cardizem gtt this morning will transition to PO Cardizem. 8/23: No complaints noted overnight.     8/24: Patient is lying in the bed. No acute distress. 8/25: No new cardiac complaints. Echo pending. Records from hospital admission course thus far reviewed. Telemetry reviewed. AF 80s       INPATIENT MEDICATIONS:  Home medications reviewed.     Current Facility-Administered Medications:     perflutren lipid microspheres (DEFINITY) 1.1 mg/mL contrast injection, , , ,     rivaroxaban (XARELTO) tablet 20 mg, 20 mg, Oral, DAILY WITH BREAKFAST, Manuela Gutierrez PA-C, 20 mg at 08/25/22 1041    dilTIAZem ER (CARDIZEM SR) capsule 120 mg, 120 mg, Oral, DAILY, Nicole Scott NP, 120 mg at 08/25/22 1042    melatonin tablet 5 mg, 5 mg, Oral, QHS PRN, Liz Dukes MD, 5 mg at 08/23/22 8633    [COMPLETED] meropenem (MERREM) 1 g in 0.9% sodium chloride 20 mL IV syringe, 1 g, IntraVENous, NOW, 1 g at 08/21/22 1018 **FOLLOWED BY** meropenem (MERREM) 1 g in 0.9% sodium chloride (MBP/ADV) 50 mL MBP, 1 g, IntraVENous, Q8H, Юлия Sarkar MD, Last Rate: 16.7 mL/hr at 08/25/22 1043, 1 g at 08/25/22 1043    metoprolol tartrate (LOPRESSOR) tablet 12.5 mg, 12.5 mg, Oral, Q12H, Fiordaliza Kovacs, FNP, 12.5 mg at 08/25/22 1041    oxyCODONE IR (ROXICODONE) tablet 5 mg, 5 mg, Oral, Q4H PRN, Blessing Membreno MD, 5 mg at 08/25/22 1200    digoxin (LANOXIN) tablet 0.25 mg, 0.25 mg, Oral, DAILY, Kyree Hector NP, 0.25 mg at 08/25/22 1041    sodium chloride (NS) flush 5-40 mL, 5-40 mL, IntraVENous, Q8H, Blessing Membreno MD, 10 mL at 08/25/22 1317    sodium chloride (NS) flush 5-40 mL, 5-40 mL, IntraVENous, PRN, Blessing Membreno MD, 10 mL at 08/22/22 1511    acetaminophen (TYLENOL) tablet 650 mg, 650 mg, Oral, Q6H PRN, 650 mg at 08/21/22 2112 **OR** acetaminophen (TYLENOL) suppository 650 mg, 650 mg, Rectal, Q6H PRN, Blessing Membreno MD    ondansetron (ZOFRAN ODT) tablet 4 mg, 4 mg, Oral, Q8H PRN **OR** ondansetron (ZOFRAN) injection 4 mg, 4 mg, IntraVENous, Q6H PRN, Blessing Membreno MD     ALLERGIES:  Allergies reviewed with the patient,No Known Allergies . FAMILY HISTORY:  Family history reviewed. Not significant. SOCIAL HISTORY:  Notable for no  tobacco use, no heavy alcohol or illicit drug use. REVIEW OF SYSTEMS:  Complete review of systems performed, pertinents noted above, all other systems are negative. PHYSICAL EXAMINATION:  Vital sign assessment reveal a blood pressure of  115/73  and pulse rate of  96. Cardiovascular exam has a heart with a irregular rate and rhythm, normal S1 and S2. No murmur present. There are no rubs or gallops. Good peripheral pulses. No jugular venous distension. No carotid bruits are present. Respiratory exam reveals clear lung fields, no rales or rhonchi. Gastrointestinal exam has soft, nontender abdomen with normal bowel sounds. Lymphatic exam reveals chronic lymphedema and no varicosities. Chronic hyperpigmented skin changes. Neurologic exam is nonfocal.  Musculoskeletal exam is notable for a normal gait. Recent labs results and imaging reviewed. Notable findings include   Lab Results   Component Value Date/Time    WBC 15.1 (H) 08/25/2022 05:07 AM    HGB 9.8 (L) 08/25/2022 05:07 AM    HCT 31.4 (L) 08/25/2022 05:07 AM    PLATELET 179 96/23/7989 05:07 AM    MCV 88.2 08/25/2022 05:07 AM     Lab Results   Component Value Date/Time    TSH 4.74 (H) 08/18/2022 07:32 PM    T4, Free 1.2 08/21/2022 10:45 AM      Lab Results   Component Value Date/Time    CK 29 (L) 08/18/2022 07:32 PM     .       Case was discussed with Dr. Rajani Joseph  and our impression and recommendations are as follows:      1 Atrial fibrillation with rapid rate:   Xarelto and Eliquis were discontinued due to prohibitive cost. Will start patient on Pradaxa 150mg BID. Continue dig 0.25 mg po daily. BP acceptable despite sepsis on admission. Continue low dose beta blocker and will add Cardizem  mg daily. Echo pending. Digoxin 0.6. 2.  DVT: Chronic lymphedema per patient. Unclear history. Venous duplex negative. Continue Pradaxa 150 mg BID. 3.  Super morbid obesity: Educated on diet and lifestyle modifications. Recommend bariatric surgery evaluation in the outpatient setting. Thank you for involving us in the care of this patient. Please do not hesitate to call if additional questions arise. Karen Sheridan NP  8/25/2022   Patient seen and examined. Agree with findings and plan of care per MEENA Prieto. Dr. [facility]  [address], [address] [facility]  [phone]

## 2022-08-26 NOTE — PROGRESS NOTES
Discharge plan of care/case management plan validated with provider discharge order. I have reviewed discharge instructions with the patient. The patient verbalized understanding. Discharge medications reviewed with patient and appropriate educational materials and side effects teaching were provided. Discharge to home with home health. Set up for transportation done with [facility]. Seen by Dr Logan Callejas prior to discharge, special instructions understood by patient. Discussed with daughter the recommendation to follow-up with primary care physician regarding xarelto coverage.

## 2022-08-26 NOTE — PROGRESS NOTES
Progress Note    Patient: Kandi Claudio. MRN: [id number]  SSN: [id number]    YOB: 1970  Age: 47 y.o. Sex: male      Admit Date: 8/18/2022    LOS: 7 days     Subjective:   Patient followed for sepsis with scrotal cellulitis and UTI. Urine cuture and blood culture grew Proteus mirabilis. He is afebrile with slight decrease in WBC and decreasing CRP and procal.  Patient currently on IV Meropenem with plans for discharge, however, today reporting concerns about painful tongue lesions. He is schedule for discharge today. Objective:     Vitals:    08/25/22 0023 08/25/22 0130 08/25/22 0809 08/25/22 1230   BP:  128/67 (!) 140/62 (!) 113/58   Pulse: 89 83 74 84   Resp:  16 18 18   Temp:  98.1 °F (36.7 °C) 97.8 °F (36.6 °C) 97.6 °F (36.4 °C)   SpO2:  95% 96% 94%   Weight:       Height:            Intake and Output:  Current Shift: No intake/output data recorded. Last three shifts: 08/24 0701 - 08/25 1900  In: 500 [P.O.:500]  Out: 4165 [Urine:4165]    Physical Exam:       Vitals and nursing note reviewed. Constitutional:       Appearance: He is obese. He is ill-appearing. HENT: Tip of tone erythematous with several shallow painful ulcers, also dry lesion corner of mouth  Genitourinary:     Comments: Siddiqi catheter  Moderate scrotal swelling, minimal erythema and tenderness, no drainage today  Musculoskeletal:         General: Swelling present. Cervical back: Neck supple. Right lower leg: Edema present. Left lower leg: Edema present. Skin:     Findings: No rash. Comments: Hyperkeratotic changes both distal calves likely secondary to chronic venous stasis along with erythema   Neurological:      General: No focal deficit present. Mental Status: He is alert and oriented to person, place, and time. Psychiatric:         Mood and Affect: Mood normal.         Behavior: Behavior normal.         Thought Content:  Thought content normal.         Judgment: Judgment normal. Lab/Data Review:     WBC 15,100     CRP 7.80 <8.85 <9.60 <11.00 <8.73 <7.73 <13.60  Procal 0.30 <0.64 <0.70 <1.56 <1.94 <2.67 <10.44      Urine cultures (8/17) >100,000 col/ml Proteus mirabilis  Blood cultures (8/18) Proteus mirabilis    Assessment:     Principal Problem:    SIRS (systemic inflammatory response syndrome) ([address] (8/18/2022)    Active Problems:    Testicular swelling (8/18/2022)      Acute renal failure ([address] (8/18/2022)      Atrial fibrillation with rapid ventricular response ([address] (8/18/2022)      UTI (urinary tract infection) (8/25/2022)      Cellulitis of male genitalia (8/25/2022)      Bacteremia (8/25/2022)  Sepsis with leukocytosis, elevated ESR, CRP, procal  Scrotal cellulitis, etiology unclear, on IV Meropenem  UTI with pyuria and bacteria, secondary to Proteus mirabilis and E. coli, on Day #8 IV antibiotics, Day #5 IV Meropenem   Gram negative bacteremia, with Proteus species, presumed secondary to UTI, on Day #8 IV antibiotics, Day #5 IV Meropenem n  Chronic lymphedema with venous stasis dermatitis  Morbid obesity  7. Tongue ulcers, painful, consistent with aphthous ulcers, rule out HSV    Comment:  WBC, CRP and procal decreasing. Plan:         1. Continue IV Meropenem for 2 more weeks    2. HSV 1/2 PCR taken from tongue lesions  3. Cleared for discharge from ID standpoint.     Signed By: Haylee Oliveira MD     August 26, 2022

## 2022-08-31 LAB
HSV1 DNA SPEC QL NAA+PROBE: POSITIVE
HSV2 DNA SPEC QL NAA+PROBE: NEGATIVE
SPECIMEN SOURCE: ABNORMAL

## 2022-09-12 ENCOUNTER — TELEPHONE (OUTPATIENT)
Dept: INFECTIOUS DISEASES | Age: 52
End: 2022-09-12

## 2022-09-12 NOTE — TELEPHONE ENCOUNTER
Amelia Barnes from [facility] called in stated that the patient has finished his abx and she wants to know if the PICC line can be pulled or does it need to stay in. If it needs to stay in they need an order for supplies to maintain the PICC line. Amelia Barnes can be reached at [phone].

## 2022-09-16 ENCOUNTER — HOSPITAL ENCOUNTER (EMERGENCY)
Age: 52
Discharge: HOME | End: 2022-09-16
Attending: EMERGENCY MEDICINE
Payer: MEDICARE

## 2022-09-16 ENCOUNTER — APPOINTMENT (OUTPATIENT)
Dept: CT IMAGING | Age: 52
End: 2022-09-16
Attending: EMERGENCY MEDICINE
Payer: MEDICARE

## 2022-09-16 ENCOUNTER — TELEPHONE (OUTPATIENT)
Dept: INFECTIOUS DISEASES | Age: 52
End: 2022-09-16

## 2022-09-16 VITALS
WEIGHT: 315 LBS | DIASTOLIC BLOOD PRESSURE: 71 MMHG | BODY MASS INDEX: 42.66 KG/M2 | SYSTOLIC BLOOD PRESSURE: 152 MMHG | OXYGEN SATURATION: 98 % | HEIGHT: 72 IN | RESPIRATION RATE: 18 BRPM | HEART RATE: 86 BPM | TEMPERATURE: 98.8 F

## 2022-09-16 DIAGNOSIS — T83.9XXA FOLEY CATHETER PROBLEM, INITIAL ENCOUNTER (HCC): Primary | ICD-10-CM

## 2022-09-16 LAB
ALBUMIN SERPL-MCNC: 3 G/DL (ref 3.5–5)
ALBUMIN/GLOB SERPL: 0.6 {RATIO} (ref 1.1–2.2)
ALP SERPL-CCNC: 174 U/L (ref 45–117)
ALT SERPL-CCNC: 32 U/L (ref 12–78)
ANION GAP SERPL CALC-SCNC: 8 MMOL/L (ref 5–15)
APPEARANCE UR: ABNORMAL
AST SERPL W P-5'-P-CCNC: 18 U/L (ref 15–37)
BACTERIA URNS QL MICRO: NEGATIVE /HPF
BASOPHILS # BLD: 0.1 K/UL (ref 0–0.1)
BASOPHILS NFR BLD: 1 % (ref 0–1)
BILIRUB SERPL-MCNC: 1.2 MG/DL (ref 0.2–1)
BILIRUB UR QL: NEGATIVE
BUN SERPL-MCNC: 19 MG/DL (ref 6–20)
BUN/CREAT SERPL: 11 (ref 12–20)
CA-I BLD-MCNC: 9 MG/DL (ref 8.5–10.1)
CHLORIDE SERPL-SCNC: 99 MMOL/L (ref 97–108)
CO2 SERPL-SCNC: 28 MMOL/L (ref 21–32)
COLOR UR: ABNORMAL
CREAT SERPL-MCNC: 1.8 MG/DL (ref 0.7–1.3)
DIFFERENTIAL METHOD BLD: ABNORMAL
EOSINOPHIL # BLD: 0.1 K/UL (ref 0–0.4)
EOSINOPHIL NFR BLD: 1 % (ref 0–7)
ERYTHROCYTE [DISTWIDTH] IN BLOOD BY AUTOMATED COUNT: 15.2 % (ref 11.5–14.5)
GLOBULIN SER CALC-MCNC: 5 G/DL (ref 2–4)
GLUCOSE SERPL-MCNC: 129 MG/DL (ref 65–100)
GLUCOSE UR STRIP.AUTO-MCNC: NEGATIVE MG/DL
HCT VFR BLD AUTO: 32.1 % (ref 36.6–50.3)
HGB BLD-MCNC: 10.4 G/DL (ref 12.1–17)
HGB UR QL STRIP: ABNORMAL
IMM GRANULOCYTES # BLD AUTO: 0.1 K/UL (ref 0–0.04)
IMM GRANULOCYTES NFR BLD AUTO: 1 % (ref 0–0.5)
KETONES UR QL STRIP.AUTO: NEGATIVE MG/DL
LEUKOCYTE ESTERASE UR QL STRIP.AUTO: ABNORMAL
LYMPHOCYTES # BLD: 1.3 K/UL (ref 0.8–3.5)
LYMPHOCYTES NFR BLD: 9 % (ref 12–49)
MCH RBC QN AUTO: 27.3 PG (ref 26–34)
MCHC RBC AUTO-ENTMCNC: 32.4 G/DL (ref 30–36.5)
MCV RBC AUTO: 84.3 FL (ref 80–99)
MONOCYTES # BLD: 1.3 K/UL (ref 0–1)
MONOCYTES NFR BLD: 9 % (ref 5–13)
NEUTS SEG # BLD: 11.3 K/UL (ref 1.8–8)
NEUTS SEG NFR BLD: 79 % (ref 32–75)
NITRITE UR QL STRIP.AUTO: NEGATIVE
NRBC # BLD: 0 K/UL (ref 0–0.01)
NRBC BLD-RTO: 0 PER 100 WBC
PH UR STRIP: 8 [PH]
PLATELET # BLD AUTO: 407 K/UL (ref 150–400)
PMV BLD AUTO: 8.3 FL (ref 8.9–12.9)
POTASSIUM SERPL-SCNC: 4.1 MMOL/L (ref 3.5–5.1)
PROT SERPL-MCNC: 8 G/DL (ref 6.4–8.2)
PROT UR STRIP-MCNC: >300 MG/DL
RBC # BLD AUTO: 3.81 M/UL (ref 4.1–5.7)
RBC #/AREA URNS HPF: >100 /HPF (ref 0–5)
SODIUM SERPL-SCNC: 135 MMOL/L (ref 136–145)
SP GR UR REFRACTOMETRY: 1.01 (ref 1–1.03)
UROBILINOGEN UR QL STRIP.AUTO: 1 EU/DL (ref 0.2–1)
WBC # BLD AUTO: 14.1 K/UL (ref 4.1–11.1)
WBC URNS QL MICRO: >100 /HPF (ref 0–4)

## 2022-09-16 PROCEDURE — 87186 SC STD MICRODIL/AGAR DIL: CPT

## 2022-09-16 PROCEDURE — 87086 URINE CULTURE/COLONY COUNT: CPT

## 2022-09-16 PROCEDURE — 87070 CULTURE OTHR SPECIMN AEROBIC: CPT

## 2022-09-16 PROCEDURE — 81001 URINALYSIS AUTO W/SCOPE: CPT

## 2022-09-16 PROCEDURE — 99222 1ST HOSP IP/OBS MODERATE 55: CPT | Performed by: INTERNAL MEDICINE

## 2022-09-16 PROCEDURE — 99283 EMERGENCY DEPT VISIT LOW MDM: CPT

## 2022-09-16 PROCEDURE — 36415 COLL VENOUS BLD VENIPUNCTURE: CPT

## 2022-09-16 PROCEDURE — 51798 US URINE CAPACITY MEASURE: CPT

## 2022-09-16 PROCEDURE — 80053 COMPREHEN METABOLIC PANEL: CPT

## 2022-09-16 PROCEDURE — 85025 COMPLETE CBC W/AUTO DIFF WBC: CPT

## 2022-09-16 PROCEDURE — 87077 CULTURE AEROBIC IDENTIFY: CPT

## 2022-09-16 NOTE — ED TRIAGE NOTES
PT with indwelling catheter that states is clogged.  PT also C/O lower back pain ongoing since late august.

## 2022-09-16 NOTE — DISCHARGE INSTRUCTIONS
Thank you! Thank you for allowing me to care for you in the emergency department. It is my goal to provide you with excellent care. If you have not received excellent quality care, please ask to speak to the nurse manager. Please fill out the survey that will come to you by mail or email since we listen to your feedback! Below you will find a list of your tests from today's visit. Should you have any questions, please do not hesitate to call the emergency department. Labs  Recent Results (from the past 12 hour(s))   CBC WITH AUTOMATED DIFF    Collection Time: 09/16/22 10:43 AM   Result Value Ref Range    WBC 14.1 (H) 4.1 - 11.1 K/uL    RBC 3.81 (L) 4.10 - 5.70 M/uL    HGB 10.4 (L) 12.1 - 17.0 g/dL    HCT 32.1 (L) 36.6 - 50.3 %    MCV 84.3 80.0 - 99.0 FL    MCH 27.3 26.0 - 34.0 PG    MCHC 32.4 30.0 - 36.5 g/dL    RDW 15.2 (H) 11.5 - 14.5 %    PLATELET 014 (H) 785 - 400 K/uL    MPV 8.3 (L) 8.9 - 12.9 FL    NRBC 0.0 0.0  WBC    ABSOLUTE NRBC 0.00 0.00 - 0.01 K/uL    NEUTROPHILS 79 (H) 32 - 75 %    LYMPHOCYTES 9 (L) 12 - 49 %    MONOCYTES 9 5 - 13 %    EOSINOPHILS 1 0 - 7 %    BASOPHILS 1 0 - 1 %    IMMATURE GRANULOCYTES 1 (H) 0 - 0.5 %    ABS. NEUTROPHILS 11.3 (H) 1.8 - 8.0 K/UL    ABS. LYMPHOCYTES 1.3 0.8 - 3.5 K/UL    ABS. MONOCYTES 1.3 (H) 0.0 - 1.0 K/UL    ABS. EOSINOPHILS 0.1 0.0 - 0.4 K/UL    ABS. BASOPHILS 0.1 0.0 - 0.1 K/UL    ABS. IMM.  GRANS. 0.1 (H) 0.00 - 0.04 K/UL    DF AUTOMATED     METABOLIC PANEL, COMPREHENSIVE    Collection Time: 09/16/22 10:43 AM   Result Value Ref Range    Sodium 135 (L) 136 - 145 mmol/L    Potassium 4.1 3.5 - 5.1 mmol/L    Chloride 99 97 - 108 mmol/L    CO2 28 21 - 32 mmol/L    Anion gap 8 5 - 15 mmol/L    Glucose 129 (H) 65 - 100 mg/dL    BUN 19 6 - 20 mg/dL    Creatinine 1.80 (H) 0.70 - 1.30 mg/dL    BUN/Creatinine ratio 11 (L) 12 - 20      GFR est AA 48 (L) >60 ml/min/1.73m2    GFR est non-AA 40 (L) >60 ml/min/1.73m2    Calcium 9.0 8.5 - 10.1 mg/dL    Bilirubin, total 1.2 (H) 0.2 - 1.0 mg/dL    AST (SGOT) 18 15 - 37 U/L    ALT (SGPT) 32 12 - 78 U/L    Alk. phosphatase 174 (H) 45 - 117 U/L    Protein, total 8.0 6.4 - 8.2 g/dL    Albumin 3.0 (L) 3.5 - 5.0 g/dL    Globulin 5.0 (H) 2.0 - 4.0 g/dL    A-G Ratio 0.6 (L) 1.1 - 2.2     URINALYSIS W/ RFLX MICROSCOPIC    Collection Time: 09/16/22 10:43 AM   Result Value Ref Range    Color Red      Appearance Hazy (A) Clear      Specific gravity 1.010 1.003 - 1.030      pH (UA) 8.0      Protein >300 (A) Negative mg/dL    Glucose Negative Negative mg/dL    Ketone Negative Negative mg/dL    Bilirubin Negative Negative      Blood Large (A) Negative      Urobilinogen 1.0 0.2 - 1.0 EU/dL    Nitrites Negative Negative      Leukocyte Esterase Large (A) Negative     URINE MICROSCOPIC    Collection Time: 09/16/22 10:43 AM   Result Value Ref Range    WBC >100 (H) 0 - 4 /hpf    RBC >100 (H) 0 - 5 /hpf    Bacteria Negative Negative /hpf       Radiologic Studies  No orders to display     CT Results  (Last 48 hours)      None          CXR Results  (Last 48 hours)      None          ------------------------------------------------------------------------------------------------------------  The exam and treatment you received in the Emergency Department were for an urgent problem and are not intended as complete care. It is important that you follow-up with a doctor, nurse practitioner, or physician assistant to:  (1) confirm your diagnosis,  (2) re-evaluation of changes in your illness and treatment, and  (3) for ongoing care. Please take your discharge instructions with you when you go to your follow-up appointment. If you have any problem arranging a follow-up appointment, contact the Emergency Department. If your symptoms become worse or you do not improve as expected and you are unable to reach your health care provider, please return to the Emergency Department. We are available 24 hours a day.      If a prescription has been provided, please have it filled as soon as possible to prevent a delay in treatment. If you have any questions or reservations about taking the medication due to side effects or interactions with other medications, please call your primary care provider or contact the ER.

## 2022-09-16 NOTE — TELEPHONE ENCOUNTER
Jarvis Boothe from [facility] called in stated that the patient went to the ER and he told her that he saw you in the ER and you was going to continuing with the PICC line and the abx was going to resume. She wants clarification on what you told the patient.  Silvestre Jackson can be reached at [phone]

## 2022-09-16 NOTE — ED PROVIDER NOTES
EMERGENCY DEPARTMENT HISTORY AND PHYSICAL EXAM      Date: 9/16/2022  Patient Name: Kandi Claudio. History of Presenting Illness     Chief Complaint   Patient presents with    Urinary Catheter Problem       History Provided By: Patient    HPI: Kandi Claudio., 47 y.o. male presenting to the emergency department for evaluation of urinary retention. Patient reportedly received Siddiqi catheter for scrotal cellulitis. States that he has not had output for the last day. Reports associated lower abdominal pain and urinary leakage around the catheter/meatus. Reports no fever, nausea or vomiting. There are no other complaints, changes, or physical findings at this time. PCP: None    No current facility-administered medications on file prior to encounter. Current Outpatient Medications on File Prior to Encounter   Medication Sig Dispense Refill    digoxin (LANOXIN) 0.25 mg tablet Take 1 Tablet by mouth daily for 30 days. 30 Tablet 0    dilTIAZem ER (CARDIZEM SR) 120 mg capsule Take 1 Capsule by mouth daily for 30 days. 30 Capsule 1    metoprolol tartrate (LOPRESSOR) 25 mg tablet Take 0.5 Tablets by mouth every twelve (12) hours for 30 days. 30 Tablet 1    rivaroxaban (Xarelto) 20 mg tab tablet Take 1 Tablet by mouth daily (with breakfast) for 30 days.  30 Tablet 0       Past History     Past Medical History:  Past Medical History:   Diagnosis Date    DVT (deep venous thrombosis) ([facility] [address]     Hypertension 7/27/2020       Past Surgical History:  Past Surgical History:   Procedure Laterality Date    HX ORTHOPAEDIC      Ankle    HX ORTHOPAEDIC      Tumors removed from left knee     HX UROLOGICAL      Penile surgery       Family History:  Family History   Problem Relation Age of Onset    Hypertension Mother     Lung Disease Father        Social History:  Social History     Tobacco Use    Smoking status: Never    Smokeless tobacco: Never   Substance Use Topics    Alcohol use: Not Currently    Drug use: Not Currently       Allergies:  No Known Allergies    Review of Systems   Review of Systems   Constitutional:  Negative for chills and fever. HENT:  Negative for congestion and rhinorrhea. Eyes:  Negative for photophobia and visual disturbance. Respiratory:  Negative for cough and shortness of breath. Cardiovascular:  Negative for chest pain and palpitations. Gastrointestinal:  Positive for abdominal pain. Negative for diarrhea, nausea and vomiting. Genitourinary:  Positive for decreased urine volume, difficulty urinating and penile discharge. Negative for dysuria. Musculoskeletal:  Negative for arthralgias and myalgias. Skin:  Negative for color change and rash. Neurological:  Negative for weakness and headaches. Psychiatric/Behavioral:  Negative for dysphoric mood and sleep disturbance. Physical Exam   Physical Exam  Constitutional:       General: He is not in acute distress. Appearance: Normal appearance. He is not ill-appearing. HENT:      Head: Normocephalic and atraumatic. Right Ear: External ear normal.      Left Ear: External ear normal.      Nose: Nose normal.      Mouth/Throat:      Mouth: Mucous membranes are moist.   Eyes:      Extraocular Movements: Extraocular movements intact. Conjunctiva/sclera: Conjunctivae normal.      Pupils: Pupils are equal, round, and reactive to light. Cardiovascular:      Rate and Rhythm: Normal rate and regular rhythm. Pulses: Normal pulses. Pulmonary:      Effort: Pulmonary effort is normal. No respiratory distress. Breath sounds: Normal breath sounds. Abdominal:      General: Abdomen is flat. There is no distension. Genitourinary:     Comments: Scrotal erythema without tenderness  Musculoskeletal:         General: Normal range of motion. Cervical back: Normal range of motion. Skin:     General: Skin is warm and dry. Neurological:      General: No focal deficit present.       Mental Status: He is alert and oriented to person, place, and time. Psychiatric:         Mood and Affect: Mood normal.         Behavior: Behavior normal.         Thought Content: Thought content normal.         Judgment: Judgment normal.       Lab and Diagnostic Study Results   Labs -     Recent Results (from the past 12 hour(s))   CBC WITH AUTOMATED DIFF    Collection Time: 09/16/22 10:43 AM   Result Value Ref Range    WBC 14.1 (H) 4.1 - 11.1 K/uL    RBC 3.81 (L) 4.10 - 5.70 M/uL    HGB 10.4 (L) 12.1 - 17.0 g/dL    HCT 32.1 (L) 36.6 - 50.3 %    MCV 84.3 80.0 - 99.0 FL    MCH 27.3 26.0 - 34.0 PG    MCHC 32.4 30.0 - 36.5 g/dL    RDW 15.2 (H) 11.5 - 14.5 %    PLATELET 256 (H) 204 - 400 K/uL    MPV 8.3 (L) 8.9 - 12.9 FL    NRBC 0.0 0.0  WBC    ABSOLUTE NRBC 0.00 0.00 - 0.01 K/uL    NEUTROPHILS 79 (H) 32 - 75 %    LYMPHOCYTES 9 (L) 12 - 49 %    MONOCYTES 9 5 - 13 %    EOSINOPHILS 1 0 - 7 %    BASOPHILS 1 0 - 1 %    IMMATURE GRANULOCYTES 1 (H) 0 - 0.5 %    ABS. NEUTROPHILS 11.3 (H) 1.8 - 8.0 K/UL    ABS. LYMPHOCYTES 1.3 0.8 - 3.5 K/UL    ABS. MONOCYTES 1.3 (H) 0.0 - 1.0 K/UL    ABS. EOSINOPHILS 0.1 0.0 - 0.4 K/UL    ABS. BASOPHILS 0.1 0.0 - 0.1 K/UL    ABS. IMM. GRANS. 0.1 (H) 0.00 - 0.04 K/UL    DF AUTOMATED     METABOLIC PANEL, COMPREHENSIVE    Collection Time: 09/16/22 10:43 AM   Result Value Ref Range    Sodium 135 (L) 136 - 145 mmol/L    Potassium 4.1 3.5 - 5.1 mmol/L    Chloride 99 97 - 108 mmol/L    CO2 28 21 - 32 mmol/L    Anion gap 8 5 - 15 mmol/L    Glucose 129 (H) 65 - 100 mg/dL    BUN 19 6 - 20 mg/dL    Creatinine 1.80 (H) 0.70 - 1.30 mg/dL    BUN/Creatinine ratio 11 (L) 12 - 20      GFR est AA 48 (L) >60 ml/min/1.73m2    GFR est non-AA 40 (L) >60 ml/min/1.73m2    Calcium 9.0 8.5 - 10.1 mg/dL    Bilirubin, total 1.2 (H) 0.2 - 1.0 mg/dL    AST (SGOT) 18 15 - 37 U/L    ALT (SGPT) 32 12 - 78 U/L    Alk.  phosphatase 174 (H) 45 - 117 U/L    Protein, total 8.0 6.4 - 8.2 g/dL    Albumin 3.0 (L) 3.5 - 5.0 g/dL    Globulin 5.0 (H) 2.0 - 4.0 g/dL    A-G Ratio 0.6 (L) 1.1 - 2.2     URINALYSIS W/ RFLX MICROSCOPIC    Collection Time: 09/16/22 10:43 AM   Result Value Ref Range    Color Red      Appearance Hazy (A) Clear      Specific gravity 1.010 1.003 - 1.030      pH (UA) 8.0      Protein >300 (A) Negative mg/dL    Glucose Negative Negative mg/dL    Ketone Negative Negative mg/dL    Bilirubin Negative Negative      Blood Large (A) Negative      Urobilinogen 1.0 0.2 - 1.0 EU/dL    Nitrites Negative Negative      Leukocyte Esterase Large (A) Negative     URINE MICROSCOPIC    Collection Time: 09/16/22 10:43 AM   Result Value Ref Range    WBC >100 (H) 0 - 4 /hpf    RBC >100 (H) 0 - 5 /hpf    Bacteria Negative Negative /hpf       Radiologic Studies -   @lastxrresult@  CT Results  (Last 48 hours)      None          CXR Results  (Last 48 hours)      None            Medical Decision Making and ED Course   Differential Diagnosis & Medical Decision Making Provider Note:   27-year-old male presenting for evaluation of urinary catheter malfunction. States he has not had urinary output for the last 1 day. Reports associated lower abdominal tenderness. On exam, patient with scrotal erythema. Siddiqi catheter removed by nursing staff prior to my examination. Patient with lower abdominal fullness. CBC, CMP, urinalysis, demonstrating slight leukocytosis with persistent urinary WBCs. Patient with OSIEL which I suspect is related to obstructive process given significant urinary release after Siddiqi removal. Case discussed with both urology and infectious disease. Patient is stable for discharge home at this time. Urology will see in office. Infectious disease will continue outpatient antibiotics via PICC line. - I am the first provider for this patient. I reviewed the vital signs, available nursing notes, past medical history, past surgical history, family history and social history.  The patients presenting problems have been discussed, and they are in agreement with the care plan formulated and outlined with them. I have encouraged them to ask questions as they arise throughout their visit. Vital Signs-Reviewed the patient's vital signs. Patient Vitals for the past 12 hrs:   Temp Pulse Resp BP SpO2   09/16/22 1200 -- -- 18 (!) 152/71 98 %   09/16/22 1130 -- -- 18 (!) 142/78 98 %   09/16/22 1045 -- -- 20 129/72 98 %   09/16/22 1030 -- -- -- 127/68 99 %   09/16/22 0953 98.8 °F (37.1 °C) 86 16 (!) 149/73 --   09/16/22 0858 98.3 °F (36.8 °C) 92 18 (!) 151/87 --       ED Course:   ED Course as of 09/16/22 1757   Fri Sep 16, 2022   1139 Spoke with urologist, philip Hudson to discharge patient without replacing urinary catheter. Per urology, no indication for antibiotics at this time as patient completed a course of antibiotics via PICC line 4 days ago. [RS]   [address] with infectious disease physician, Dr. Patria Brooks, who request patient keep PICC line and will call in antibiotics pending results of urine culture. [RS]      ED Course User Index  [RS] Amish Atkins DO         Procedures   Performed by: Daryn Dobbs DO  Procedures      Disposition   Disposition: Condition stable  DC- Adult Discharges: All of the diagnostic tests were reviewed and questions answered. Diagnosis, care plan and treatment options were discussed. The patient understands the instructions and will follow up as directed. The patients results have been reviewed with them. They have been counseled regarding their diagnosis. The patient verbally convey understanding and agreement of the signs, symptoms, diagnosis, treatment and prognosis and additionally agrees to follow up as recommended with their PCP in 24 - 48 hours. They also agree with the care-plan and convey that all of their questions have been answered.   I have also put together some discharge instructions for them that include: 1) educational information regarding their diagnosis, 2) how to care for their diagnosis at home, as well a 3) list of reasons why they would want to return to the ED prior to their follow-up appointment, should their condition change. DC-The patient was given verbal follow-up instructions    DISCHARGE PLAN:  1. Current Discharge Medication List        CONTINUE these medications which have NOT CHANGED    Details   digoxin (LANOXIN) 0.25 mg tablet Take 1 Tablet by mouth daily for 30 days. Qty: 30 Tablet, Refills: 0      dilTIAZem ER (CARDIZEM SR) 120 mg capsule Take 1 Capsule by mouth daily for 30 days. Qty: 30 Capsule, Refills: 1      metoprolol tartrate (LOPRESSOR) 25 mg tablet Take 0.5 Tablets by mouth every twelve (12) hours for 30 days. Qty: 30 Tablet, Refills: 1      rivaroxaban (Xarelto) 20 mg tab tablet Take 1 Tablet by mouth daily (with breakfast) for 30 days. Qty: 30 Tablet, Refills: 0           2. Follow-up Information       Follow up With Specialties Details Why [address] EMERGENCY DEPT Emergency Medicine  As needed, If symptoms worsen [address] [place] [address]  [phone]    Anjana Reich MD Urology Schedule an appointment as soon as possible for a visit   [address] [place] [address]  [phone]            3. Return to ED if worse   4. Discharge Medication List as of 9/16/2022  1:58 PM         Remove if admitted/transferred    Diagnosis/Clinical Impression     Clinical Impression:   1. Siddiqi catheter problem, initial encounter [facility])        Attestations: Ramin Holley DO, am the primary clinician of record. Please note that this dictation was completed with [employer], the [employer] voice recognition software. Quite often unanticipated grammatical, syntax, homophones, and other interpretive errors are inadvertently transcribed by the computer software. Please disregard these errors. Please excuse any errors that have escaped final proofreading. Thank you.

## 2022-09-16 NOTE — CONSULTS
Consult Date: 9/16/2022    Consults Complicated UTI    Subjective  This is a 47year old male followed last month for sepsis with scrotal cellulitis and UTI  and bacteremis. Urine grew Proteus mirabilis and E. Coli and blood grew Proteus alone. He was treated with Meropenem because of suboptimal response to Zosyn. He was discharged on IV Meropenem for 2 weeks and discharged with Siddiqi catheter in place. WBC remained elevated at that time but CRP and procal were decreasing. He completed the course of Meropenem with plans to remove his PICC line. He presented to the ED today because no urine was coming out of his Siddiqi catheter despite multiple flushes. He states that when catheter was removed, urine squirted out of his penis. Post-void bladder scan showed residual of <42 cc  He was afebrile but WBC remains elevated at 14,100. Urine was hazy with >100 WBC, negative bacteria. Urine culture has been sent. ID was consulted for this reason. Patient seen in the ED where is otherwise stable. Affirms history as noted above. No fever or chills. No flank pain. Still has PICC line in place. Past Medical History:   Diagnosis Date    DVT (deep venous thrombosis) ([facility] [address]     Hypertension 7/27/2020      Past Surgical History:   Procedure Laterality Date    HX ORTHOPAEDIC      Ankle    HX ORTHOPAEDIC      Tumors removed from left knee     HX UROLOGICAL      Penile surgery     Family History   Problem Relation Age of Onset    Hypertension Mother     Lung Disease Father       Social History     Tobacco Use    Smoking status: Never    Smokeless tobacco: Never   Substance Use Topics    Alcohol use: Not Currently       Current Outpatient Medications   Medication Sig Dispense Refill    digoxin (LANOXIN) 0.25 mg tablet Take 1 Tablet by mouth daily for 30 days. 30 Tablet 0    dilTIAZem ER (CARDIZEM SR) 120 mg capsule Take 1 Capsule by mouth daily for 30 days.  30 Capsule 1    metoprolol tartrate (LOPRESSOR) 25 mg tablet Take 0.5 Tablets by mouth every twelve (12) hours for 30 days. 30 Tablet 1    rivaroxaban (Xarelto) 20 mg tab tablet Take 1 Tablet by mouth daily (with breakfast) for 30 days. 30 Tablet 0        Review of Systems   Constitutional:  Negative for chills and fever. HENT: Negative. Eyes: Negative. Respiratory: Negative. Cardiovascular:  Positive for leg swelling. Gastrointestinal: Negative. Endocrine: Negative. Genitourinary:  Positive for difficulty urinating, dysuria and scrotal swelling. Negative for flank pain and frequency. Musculoskeletal: Negative. Skin: Negative. Allergic/Immunologic: Negative. Neurological: Negative. Hematological: Negative. Psychiatric/Behavioral: Negative. Objective     Vital signs for last 24 hours:  Visit Vitals  BP (!) 152/71   Pulse 86   Temp 98.8 °F (37.1 °C)   Resp 18   Ht 6' (1.829 m)   Wt (!) 500 lb (226.8 kg)   SpO2 98%   BMI 67.81 kg/m²       Intake/Output this shift:  Current Shift: No intake/output data recorded. Last 3 Shifts: No intake/output data recorded. Data Review:   Recent Results (from the past 24 hour(s))   CBC WITH AUTOMATED DIFF    Collection Time: 09/16/22 10:43 AM   Result Value Ref Range    WBC 14.1 (H) 4.1 - 11.1 K/uL    RBC 3.81 (L) 4.10 - 5.70 M/uL    HGB 10.4 (L) 12.1 - 17.0 g/dL    HCT 32.1 (L) 36.6 - 50.3 %    MCV 84.3 80.0 - 99.0 FL    MCH 27.3 26.0 - 34.0 PG    MCHC 32.4 30.0 - 36.5 g/dL    RDW 15.2 (H) 11.5 - 14.5 %    PLATELET 217 (H) 507 - 400 K/uL    MPV 8.3 (L) 8.9 - 12.9 FL    NRBC 0.0 0.0  WBC    ABSOLUTE NRBC 0.00 0.00 - 0.01 K/uL    NEUTROPHILS 79 (H) 32 - 75 %    LYMPHOCYTES 9 (L) 12 - 49 %    MONOCYTES 9 5 - 13 %    EOSINOPHILS 1 0 - 7 %    BASOPHILS 1 0 - 1 %    IMMATURE GRANULOCYTES 1 (H) 0 - 0.5 %    ABS. NEUTROPHILS 11.3 (H) 1.8 - 8.0 K/UL    ABS. LYMPHOCYTES 1.3 0.8 - 3.5 K/UL    ABS. MONOCYTES 1.3 (H) 0.0 - 1.0 K/UL    ABS. EOSINOPHILS 0.1 0.0 - 0.4 K/UL    ABS.  BASOPHILS 0.1 0.0 - 0.1 K/UL    ABS. IMM. GRANS. 0.1 (H) 0.00 - 0.04 K/UL    DF AUTOMATED     METABOLIC PANEL, COMPREHENSIVE    Collection Time: 09/16/22 10:43 AM   Result Value Ref Range    Sodium 135 (L) 136 - 145 mmol/L    Potassium 4.1 3.5 - 5.1 mmol/L    Chloride 99 97 - 108 mmol/L    CO2 28 21 - 32 mmol/L    Anion gap 8 5 - 15 mmol/L    Glucose 129 (H) 65 - 100 mg/dL    BUN 19 6 - 20 mg/dL    Creatinine 1.80 (H) 0.70 - 1.30 mg/dL    BUN/Creatinine ratio 11 (L) 12 - 20      GFR est AA 48 (L) >60 ml/min/1.73m2    GFR est non-AA 40 (L) >60 ml/min/1.73m2    Calcium 9.0 8.5 - 10.1 mg/dL    Bilirubin, total 1.2 (H) 0.2 - 1.0 mg/dL    AST (SGOT) 18 15 - 37 U/L    ALT (SGPT) 32 12 - 78 U/L    Alk. phosphatase 174 (H) 45 - 117 U/L    Protein, total 8.0 6.4 - 8.2 g/dL    Albumin 3.0 (L) 3.5 - 5.0 g/dL    Globulin 5.0 (H) 2.0 - 4.0 g/dL    A-G Ratio 0.6 (L) 1.1 - 2.2     URINALYSIS W/ RFLX MICROSCOPIC    Collection Time: 09/16/22 10:43 AM   Result Value Ref Range    Color Red      Appearance Hazy (A) Clear      Specific gravity 1.010 1.003 - 1.030      pH (UA) 8.0      Protein >300 (A) Negative mg/dL    Glucose Negative Negative mg/dL    Ketone Negative Negative mg/dL    Bilirubin Negative Negative      Blood Large (A) Negative      Urobilinogen 1.0 0.2 - 1.0 EU/dL    Nitrites Negative Negative      Leukocyte Esterase Large (A) Negative     URINE MICROSCOPIC    Collection Time: 09/16/22 10:43 AM   Result Value Ref Range    WBC >100 (H) 0 - 4 /hpf    RBC >100 (H) 0 - 5 /hpf    Bacteria Negative Negative /hpf       Physical Exam  Vitals and nursing note reviewed. Constitutional:       Appearance: He is obese. He is not ill-appearing. HENT:      Head: Normocephalic and atraumatic. Right Ear: External ear normal.      Left Ear: External ear normal.      Nose: Nose normal.      Mouth/Throat:      Pharynx: Oropharynx is clear. No oropharyngeal exudate. Eyes:      Pupils: Pupils are equal, round, and reactive to light.    Cardiovascular: Rate and Rhythm: Regular rhythm. Heart sounds: No murmur heard. Pulmonary:      Effort: Pulmonary effort is normal.      Breath sounds: Normal breath sounds. Abdominal:      General: There is no distension. Palpations: Abdomen is soft. Tenderness: There is no abdominal tenderness. There is no right CVA tenderness, left CVA tenderness or guarding. Genitourinary:     Comments: Retracted penis, Siddiqi catheter removed  Purulent drainage suprapubic region with probe to 7 cm  Mild scrotal swelling  Musculoskeletal:      Cervical back: Neck supple. Right lower leg: Edema present. Left lower leg: Edema present. Comments: PICC line right upper arm   Skin:     Findings: No rash. Comments: See    Neurological:      General: No focal deficit present. Mental Status: He is alert and oriented to person, place, and time. Psychiatric:         Behavior: Behavior normal.     ASSESSMENT/PLAN    Recurrent complicated UTI (indwelling Siddiqi) with marked pyuria, urine culture pending  Sepsis with leukocytosis  ? Suprapubic wound with purulent drainage    Comment:  Patient with either relapse or re-infection. I am inclined to restart Meropenem IV pending urine culture results, especially given his leukocytosis. I am unclear about the purulent drainage and anatomy in the suprapubic area. Contacted [facility] to restart IV Meropenem pending urine culture results  Culture taken from suprapubic purulent drainage  Maintain PICC line  [facility])  Follow-up in [address] [place] [address] in 2 weeks  6. Follow-up with Urology    Chad Knott MD

## 2022-09-20 LAB
BACTERIA SPEC CULT: ABNORMAL
COLONY COUNT,CNT: ABNORMAL
COLONY COUNT,CNT: ABNORMAL
SPECIAL REQUESTS,SREQ: ABNORMAL

## 2022-09-21 ENCOUNTER — TELEPHONE (OUTPATIENT)
Dept: INFECTIOUS DISEASES | Age: 52
End: 2022-09-21

## 2022-09-21 LAB
BACTERIA SPEC CULT: NORMAL
GRAM STN SPEC: NORMAL
SPECIAL REQUESTS,SREQ: NORMAL

## 2022-09-21 NOTE — TELEPHONE ENCOUNTER
Phone call from [facility]. She is calling for further orders on the patient and his picc line removal. I will send message to Dr Logan Callejas.

## 2022-09-24 PROBLEM — N39.0 UTI (URINARY TRACT INFECTION): Status: RESOLVED | Noted: 2022-08-25 | Resolved: 2022-09-24

## 2022-09-27 DIAGNOSIS — T83.511D URINARY TRACT INFECTION ASSOCIATED WITH INDWELLING URETHRAL CATHETER, SUBSEQUENT ENCOUNTER: Primary | ICD-10-CM

## 2022-09-27 DIAGNOSIS — N39.0 URINARY TRACT INFECTION ASSOCIATED WITH INDWELLING URETHRAL CATHETER, SUBSEQUENT ENCOUNTER: Primary | ICD-10-CM

## 2022-09-27 RX ORDER — LEVOFLOXACIN 750 MG/1
750 TABLET ORAL DAILY
Qty: 14 TABLET | Refills: 0 | Status: SHIPPED | OUTPATIENT
Start: 2022-09-27 | End: 2022-10-11

## 2022-12-14 ENCOUNTER — HOSPITAL ENCOUNTER (INPATIENT)
Age: 52
LOS: 5 days | Discharge: HOME | DRG: 872 | End: 2022-12-19
Attending: EMERGENCY MEDICINE | Admitting: INTERNAL MEDICINE
Payer: MEDICARE

## 2022-12-14 ENCOUNTER — APPOINTMENT (OUTPATIENT)
Dept: CT IMAGING | Age: 52
DRG: 872 | End: 2022-12-14
Attending: EMERGENCY MEDICINE
Payer: MEDICARE

## 2022-12-14 ENCOUNTER — APPOINTMENT (OUTPATIENT)
Dept: ULTRASOUND IMAGING | Age: 52
DRG: 872 | End: 2022-12-14
Attending: EMERGENCY MEDICINE
Payer: MEDICARE

## 2022-12-14 DIAGNOSIS — N49.9 CELLULITIS OF MALE GENITALIA: ICD-10-CM

## 2022-12-14 DIAGNOSIS — R82.81 PYURIA: ICD-10-CM

## 2022-12-14 DIAGNOSIS — A41.9 SEPSIS WITHOUT ACUTE ORGAN DYSFUNCTION, DUE TO UNSPECIFIED ORGANISM (HCC): ICD-10-CM

## 2022-12-14 DIAGNOSIS — R78.81 BACTEREMIA: ICD-10-CM

## 2022-12-14 DIAGNOSIS — N49.2 CELLULITIS, SCROTUM: ICD-10-CM

## 2022-12-14 DIAGNOSIS — L03.314 CELLULITIS OF GROIN: Primary | ICD-10-CM

## 2022-12-14 DIAGNOSIS — C60.9 PENILE CANCER (HCC): ICD-10-CM

## 2022-12-14 LAB
ALBUMIN SERPL-MCNC: 3 G/DL (ref 3.5–5)
ALBUMIN/GLOB SERPL: 0.6 {RATIO} (ref 1.1–2.2)
ALP SERPL-CCNC: 121 U/L (ref 45–117)
ALT SERPL-CCNC: 21 U/L (ref 12–78)
ANION GAP SERPL CALC-SCNC: 8 MMOL/L (ref 5–15)
APPEARANCE UR: ABNORMAL
AST SERPL W P-5'-P-CCNC: 18 U/L (ref 15–37)
BACTERIA URNS QL MICRO: ABNORMAL /HPF
BACTERIA URNS QL MICRO: NEGATIVE /HPF
BASOPHILS # BLD: 0.1 K/UL (ref 0–0.1)
BASOPHILS NFR BLD: 0 % (ref 0–1)
BILIRUB SERPL-MCNC: 1.3 MG/DL (ref 0.2–1)
BILIRUB UR QL: ABNORMAL
BUN SERPL-MCNC: 11 MG/DL (ref 6–20)
BUN/CREAT SERPL: 11 (ref 12–20)
CA-I BLD-MCNC: 9.2 MG/DL (ref 8.5–10.1)
CHLORIDE SERPL-SCNC: 102 MMOL/L (ref 97–108)
CO2 SERPL-SCNC: 24 MMOL/L (ref 21–32)
COLOR UR: ABNORMAL
CREAT SERPL-MCNC: 1.01 MG/DL (ref 0.7–1.3)
CRP SERPL HS-MCNC: >9.5 MG/L
DIFFERENTIAL METHOD BLD: ABNORMAL
EOSINOPHIL # BLD: 0.1 K/UL (ref 0–0.4)
EOSINOPHIL NFR BLD: 0 % (ref 0–7)
ERYTHROCYTE [DISTWIDTH] IN BLOOD BY AUTOMATED COUNT: 14.9 % (ref 11.5–14.5)
ERYTHROCYTE [SEDIMENTATION RATE] IN BLOOD: 70 MM/HR (ref 0–20)
GLOBULIN SER CALC-MCNC: 5.2 G/DL (ref 2–4)
GLUCOSE SERPL-MCNC: 99 MG/DL (ref 65–100)
GLUCOSE UR STRIP.AUTO-MCNC: NEGATIVE MG/DL
HCT VFR BLD AUTO: 39.5 % (ref 36.6–50.3)
HGB BLD-MCNC: 12.8 G/DL (ref 12.1–17)
HGB UR QL STRIP: ABNORMAL
IMM GRANULOCYTES # BLD AUTO: 0.1 K/UL (ref 0–0.04)
IMM GRANULOCYTES NFR BLD AUTO: 0 % (ref 0–0.5)
KETONES UR QL STRIP.AUTO: 15 MG/DL
LACTATE SERPL-SCNC: 1.6 MMOL/L (ref 0.4–2)
LEUKOCYTE ESTERASE UR QL STRIP.AUTO: ABNORMAL
LYMPHOCYTES # BLD: 1.8 K/UL (ref 0.8–3.5)
LYMPHOCYTES NFR BLD: 10 % (ref 12–49)
MCH RBC QN AUTO: 27.8 PG (ref 26–34)
MCHC RBC AUTO-ENTMCNC: 32.4 G/DL (ref 30–36.5)
MCV RBC AUTO: 85.7 FL (ref 80–99)
MONOCYTES # BLD: 1.2 K/UL (ref 0–1)
MONOCYTES NFR BLD: 7 % (ref 5–13)
MUCOUS THREADS URNS QL MICRO: ABNORMAL /LPF
NEUTS SEG # BLD: 15.3 K/UL (ref 1.8–8)
NEUTS SEG NFR BLD: 83 % (ref 32–75)
NITRITE UR QL STRIP.AUTO: NEGATIVE
NRBC # BLD: 0 K/UL (ref 0–0.01)
NRBC BLD-RTO: 0 PER 100 WBC
PH UR STRIP: 6 [PH]
PLATELET # BLD AUTO: 326 K/UL (ref 150–400)
PMV BLD AUTO: 8.9 FL (ref 8.9–12.9)
POTASSIUM SERPL-SCNC: 4.4 MMOL/L (ref 3.5–5.1)
PROCALCITONIN SERPL-MCNC: 0.2 NG/ML
PROT SERPL-MCNC: 8.2 G/DL (ref 6.4–8.2)
PROT UR STRIP-MCNC: ABNORMAL MG/DL
RBC # BLD AUTO: 4.61 M/UL (ref 4.1–5.7)
RBC #/AREA URNS HPF: ABNORMAL /HPF (ref 0–5)
RBC #/AREA URNS HPF: ABNORMAL /HPF (ref 0–5)
SODIUM SERPL-SCNC: 134 MMOL/L (ref 136–145)
SP GR UR REFRACTOMETRY: 1.01 (ref 1–1.03)
UROBILINOGEN UR QL STRIP.AUTO: >8 EU/DL (ref 0.2–1)
WBC # BLD AUTO: 18.5 K/UL (ref 4.1–11.1)
WBC URNS QL MICRO: >100 /HPF (ref 0–4)
WBC URNS QL MICRO: >100 /HPF (ref 0–4)

## 2022-12-14 PROCEDURE — 85652 RBC SED RATE AUTOMATED: CPT

## 2022-12-14 PROCEDURE — 74011250636 HC RX REV CODE- 250/636: Performed by: EMERGENCY MEDICINE

## 2022-12-14 PROCEDURE — 36415 COLL VENOUS BLD VENIPUNCTURE: CPT

## 2022-12-14 PROCEDURE — 87040 BLOOD CULTURE FOR BACTERIA: CPT

## 2022-12-14 PROCEDURE — 74011250636 HC RX REV CODE- 250/636: Performed by: INTERNAL MEDICINE

## 2022-12-14 PROCEDURE — 99285 EMERGENCY DEPT VISIT HI MDM: CPT

## 2022-12-14 PROCEDURE — 84145 PROCALCITONIN (PCT): CPT

## 2022-12-14 PROCEDURE — 83605 ASSAY OF LACTIC ACID: CPT

## 2022-12-14 PROCEDURE — 99223 1ST HOSP IP/OBS HIGH 75: CPT | Performed by: INTERNAL MEDICINE

## 2022-12-14 PROCEDURE — 81001 URINALYSIS AUTO W/SCOPE: CPT

## 2022-12-14 PROCEDURE — 86141 C-REACTIVE PROTEIN HS: CPT

## 2022-12-14 PROCEDURE — 85025 COMPLETE CBC W/AUTO DIFF WBC: CPT

## 2022-12-14 PROCEDURE — 76870 US EXAM SCROTUM: CPT

## 2022-12-14 PROCEDURE — 74011250637 HC RX REV CODE- 250/637: Performed by: NURSE PRACTITIONER

## 2022-12-14 PROCEDURE — 96374 THER/PROPH/DIAG INJ IV PUSH: CPT

## 2022-12-14 PROCEDURE — 87205 SMEAR GRAM STAIN: CPT

## 2022-12-14 PROCEDURE — 80053 COMPREHEN METABOLIC PANEL: CPT

## 2022-12-14 PROCEDURE — 96375 TX/PRO/DX INJ NEW DRUG ADDON: CPT

## 2022-12-14 PROCEDURE — 74011000258 HC RX REV CODE- 258: Performed by: INTERNAL MEDICINE

## 2022-12-14 PROCEDURE — 74011000250 HC RX REV CODE- 250: Performed by: EMERGENCY MEDICINE

## 2022-12-14 PROCEDURE — 99222 1ST HOSP IP/OBS MODERATE 55: CPT | Performed by: UROLOGY

## 2022-12-14 PROCEDURE — 65270000029 HC RM PRIVATE

## 2022-12-14 RX ORDER — MORPHINE SULFATE 4 MG/ML
4 INJECTION INTRAVENOUS ONCE
Status: DISPENSED | OUTPATIENT
Start: 2022-12-14 | End: 2022-12-14

## 2022-12-14 RX ORDER — ONDANSETRON 2 MG/ML
4 INJECTION INTRAMUSCULAR; INTRAVENOUS ONCE
Status: DISPENSED | OUTPATIENT
Start: 2022-12-14 | End: 2022-12-14

## 2022-12-14 RX ORDER — POLYETHYLENE GLYCOL 3350 17 G/17G
17 POWDER, FOR SOLUTION ORAL DAILY
Status: DISCONTINUED | OUTPATIENT
Start: 2022-12-15 | End: 2022-12-19 | Stop reason: HOSPADM

## 2022-12-14 RX ORDER — HYDROMORPHONE HYDROCHLORIDE 1 MG/ML
1 INJECTION, SOLUTION INTRAMUSCULAR; INTRAVENOUS; SUBCUTANEOUS
Status: ACTIVE | OUTPATIENT
Start: 2022-12-14 | End: 2022-12-16

## 2022-12-14 RX ORDER — ONDANSETRON 2 MG/ML
4 INJECTION INTRAMUSCULAR; INTRAVENOUS
Status: DISCONTINUED | OUTPATIENT
Start: 2022-12-14 | End: 2022-12-19 | Stop reason: HOSPADM

## 2022-12-14 RX ORDER — ACETAMINOPHEN 325 MG/1
650 TABLET ORAL
Status: DISCONTINUED | OUTPATIENT
Start: 2022-12-14 | End: 2022-12-19 | Stop reason: HOSPADM

## 2022-12-14 RX ORDER — HYDRALAZINE HYDROCHLORIDE 20 MG/ML
10 INJECTION INTRAMUSCULAR; INTRAVENOUS
Status: DISCONTINUED | OUTPATIENT
Start: 2022-12-14 | End: 2022-12-19 | Stop reason: HOSPADM

## 2022-12-14 RX ORDER — DOCUSATE SODIUM 100 MG/1
100 CAPSULE, LIQUID FILLED ORAL DAILY
Status: DISCONTINUED | OUTPATIENT
Start: 2022-12-15 | End: 2022-12-19 | Stop reason: HOSPADM

## 2022-12-14 RX ORDER — METOPROLOL TARTRATE 25 MG/1
12.5 TABLET, FILM COATED ORAL EVERY 12 HOURS
Status: DISCONTINUED | OUTPATIENT
Start: 2022-12-14 | End: 2022-12-19 | Stop reason: HOSPADM

## 2022-12-14 RX ADMIN — METOPROLOL TARTRATE 12.5 MG: 25 TABLET, FILM COATED ORAL at 21:46

## 2022-12-14 RX ADMIN — CEFTRIAXONE SODIUM 1 G: 1 INJECTION, POWDER, FOR SOLUTION INTRAMUSCULAR; INTRAVENOUS at 13:55

## 2022-12-14 RX ADMIN — MEROPENEM 1 G: 1 INJECTION, POWDER, FOR SOLUTION INTRAVENOUS at 17:05

## 2022-12-14 RX ADMIN — VANCOMYCIN HYDROCHLORIDE 2000 MG: 10 INJECTION, POWDER, LYOPHILIZED, FOR SOLUTION INTRAVENOUS at 13:55

## 2022-12-14 NOTE — ACP (ADVANCE CARE PLANNING)
Advance Care Planning   Healthcare Decision Maker:       Primary Decision Maker:  Ivana Banda [facility] [phone]

## 2022-12-14 NOTE — PROGRESS NOTES
Reason for Admission:  Cellulitis                     RUR Score: 11%                    Plan for utilizing home health:    Pt signed Choice Letter for home health & for a w/c. Referrals sent via [address] Pt is bed bound d/t no w/c. PCP: First and Last name:  Per pt no PCP. Name of Practice:    Are you a current patient: Yes/No:    Approximate date of last visit:    Can you participate in a virtual visit with your PCP: Yes/Call/Has cell phone. Current Advanced Directive/Advance Care Plan: Full Code      Healthcare Decision Maker:              Primary Decision Maker: Soila Scott - [facility] - [phone]                  Transition of Care Plan:                    D/C Plan is home with home health & w/c via transportation.

## 2022-12-14 NOTE — PROGRESS NOTES
12/14/22 Per Clarice hooper accepted to [facility] @ [phone] upon discharge. Est SOC: 12/17/22.

## 2022-12-14 NOTE — H&P
History and Physical        Patient: Yuliana Arechiga. MRN: [id number]  SSN: [id number]    YOB: 1970  Age: 47 y.o. Sex: male      Subjective:      Yuliana Arechiga. is a 47 y.o. male who has a past medical history significant for type 2 diabetes, atrial fibrillation, hypertension, DVT, and morbid obesity. He presented to the ED today with complaints of scrotal edema, with severe pain. He does have serous drainage. He was admitted on 8/18/22 with similar symptoms. He was seen by Infectious Disease at that time. He denies difficulty urinating but does report bilateral flank pain. He denies recent fevers, denies shortness of breath,chest pain, or palpitations. Denies nausea, vomiting, diarrhea, or constipation. He reports he is bed bound and lives with his family. He states he is taking 3 medications but is unsure of the names and ran out of them about 4 weeks ago. He does live with his family. Mr. Corie Pelayo admitted with cellulitis of the groin for further management and treatment. Scrotal ultrasound 12/14: shows no testicular mass or torsion, again seen is scrotal soft tissue swelling which may represent edema or cellulitis. No fluid collection or abscess is seen. He does have serous drainage noted. He was given Vancomycin 2,000 mg IV x one dose in ED. Infectious Disease consult, urology consult, and general surgery consult. Urinalysis shows trace protein, large leukocyte esterase, negative nitrites, Large amount of blood, Negative Bacteria, WBC >100. WBC elevated at 18.5, sodium 134, Creatinine 1.01 total bilirubin 1.3, ALT 21, AST 18, Sed rage 70. Blood cultures pending.     Past Medical History:   Diagnosis Date    DVT (deep venous thrombosis) ([facility] [address]     Hypertension 7/27/2020     Past Surgical History:   Procedure Laterality Date    HX ORTHOPAEDIC      Ankle    HX ORTHOPAEDIC      Tumors removed from left knee     HX UROLOGICAL      Penile surgery      Family History   Problem Relation Age of Onset    Hypertension Mother     Lung Disease Father      Social History     Tobacco Use    Smoking status: Never    Smokeless tobacco: Never   Substance Use Topics    Alcohol use: Not Currently      Prior to Admission medications    Not on File        No Known Allergies    Review of Systems   Constitutional:  Negative for chills and fever. Respiratory: Negative. Cardiovascular:  Negative for chest pain, palpitations and leg swelling. Gastrointestinal:  Negative for abdominal pain, constipation, diarrhea, heartburn, nausea and vomiting. Genitourinary:  Negative for dysuria and urgency. Scrotal swelling   Musculoskeletal:  Positive for back pain and myalgias. Skin: Negative. Neurological: Negative. Psychiatric/Behavioral: Negative. Objective:     Vitals:    12/14/22 1036 12/14/22 1353   BP: 132/76 132/67   Pulse: 85 81   Resp: 19 18   Temp: 98.2 °F (36.8 °C) 98.3 °F (36.8 °C)   SpO2: 99% 100%   Weight: (!) 244.4 kg (538 lb 14.4 oz)    Height: 6' (1.829 m)         Physical Exam  Constitutional:       Appearance: He is ill-appearing. Comments: Morbid obesity   HENT:      Head: Normocephalic and atraumatic. Cardiovascular:      Rate and Rhythm: Rhythm irregular. Heart sounds: No murmur heard. No friction rub. No gallop. Pulmonary:      Effort: Pulmonary effort is normal.      Breath sounds: No wheezing or rhonchi. Comments: Diminished bilateral lower bases  Abdominal:      General: Bowel sounds are normal.      Palpations: Abdomen is soft. Tenderness: There is no abdominal tenderness. Genitourinary:     Comments: Scrotal edema, scrotal erythema  Musculoskeletal:      Right lower leg: Edema present. Left lower leg: Edema present. Skin:     Comments: Lichenification of bilateral lower extremities worse on the Left. Neurological:      Mental Status: He is alert and oriented to person, place, and time.    Psychiatric:         Mood and Affect: Mood normal.         Thought Content: Thought content normal.         Judgment: Judgment normal.         Recent Results (from the past 24 hour(s))   METABOLIC PANEL, COMPREHENSIVE    Collection Time: 12/14/22 11:55 AM   Result Value Ref Range    Sodium 134 (L) 136 - 145 mmol/L    Potassium 4.4 3.5 - 5.1 mmol/L    Chloride 102 97 - 108 mmol/L    CO2 24 21 - 32 mmol/L    Anion gap 8 5 - 15 mmol/L    Glucose 99 65 - 100 mg/dL    BUN 11 6 - 20 mg/dL    Creatinine 1.01 0.70 - 1.30 mg/dL    BUN/Creatinine ratio 11 (L) 12 - 20      eGFR >60 >60 ml/min/1.73m2    Calcium 9.2 8.5 - 10.1 mg/dL    Bilirubin, total 1.3 (H) 0.2 - 1.0 mg/dL    AST (SGOT) 18 15 - 37 U/L    ALT (SGPT) 21 12 - 78 U/L    Alk. phosphatase 121 (H) 45 - 117 U/L    Protein, total 8.2 6.4 - 8.2 g/dL    Albumin 3.0 (L) 3.5 - 5.0 g/dL    Globulin 5.2 (H) 2.0 - 4.0 g/dL    A-G Ratio 0.6 (L) 1.1 - 2.2     LACTIC ACID    Collection Time: 12/14/22 11:55 AM   Result Value Ref Range    Lactic acid 1.6 0.4 - 2.0 mmol/L   PROCALCITONIN    Collection Time: 12/14/22 11:55 AM   Result Value Ref Range    Procalcitonin 0.20 (H) 0 ng/mL   CBC WITH AUTOMATED DIFF    Collection Time: 12/14/22 12:25 PM   Result Value Ref Range    WBC 18.5 (H) 4.1 - 11.1 K/uL    RBC 4.61 4.10 - 5.70 M/uL    HGB 12.8 12.1 - 17.0 g/dL    HCT 39.5 36.6 - 50.3 %    MCV 85.7 80.0 - 99.0 FL    MCH 27.8 26.0 - 34.0 PG    MCHC 32.4 30.0 - 36.5 g/dL    RDW 14.9 (H) 11.5 - 14.5 %    PLATELET 256 101 - 213 K/uL    MPV 8.9 8.9 - 12.9 FL    NRBC 0.0 0.0  WBC    ABSOLUTE NRBC 0.00 0.00 - 0.01 K/uL    NEUTROPHILS 83 (H) 32 - 75 %    LYMPHOCYTES 10 (L) 12 - 49 %    MONOCYTES 7 5 - 13 %    EOSINOPHILS 0 0 - 7 %    BASOPHILS 0 0 - 1 %    IMMATURE GRANULOCYTES 0 0 - 0.5 %    ABS. NEUTROPHILS 15.3 (H) 1.8 - 8.0 K/UL    ABS. LYMPHOCYTES 1.8 0.8 - 3.5 K/UL    ABS. MONOCYTES 1.2 (H) 0.0 - 1.0 K/UL    ABS. EOSINOPHILS 0.1 0.0 - 0.4 K/UL    ABS. BASOPHILS 0.1 0.0 - 0.1 K/UL    ABS. IMM.  GRANS. 0.1 (H) 0.00 - 0.04 K/UL    DF AUTOMATED     SED RATE (ESR)    Collection Time: 12/14/22 12:25 PM   Result Value Ref Range    Sed rate, automated 70 (H) 0 - 20 mm/hr   URINALYSIS W/ RFLX MICROSCOPIC    Collection Time: 12/14/22  2:07 PM   Result Value Ref Range    Color Yellow/Straw      Appearance Hazy (A) Clear      Specific gravity 1.015 1.003 - 1.030      pH (UA) 6.0      Protein Trace (A) Negative mg/dL    Glucose Negative Negative mg/dL    Ketone 15 (A) Negative mg/dL    Bilirubin Small (A) Negative      Blood Large (A) Negative      Urobilinogen >8.0 (H) 0.2 - 1.0 EU/dL    Nitrites Negative Negative      Leukocyte Esterase Large (A) Negative     URINE MICROSCOPIC    Collection Time: 12/14/22  2:07 PM   Result Value Ref Range    WBC >100 (H) 0 - 4 /hpf    RBC 20-50 0 - 5 /hpf    Bacteria Negative Negative /hpf       XR Results (most recent):  Results from Hospital Encounter encounter on 08/18/22    XR CHEST PORT    Narrative  Medical indication: A. fib. Portable AP semiupright view of the chest obtained no prior. Patient's body  habitus limits the evaluation, the inspiration is shallow. No definite focal  infiltrate suspected. Next    Impression  Limited examination with no acute infiltrate suspected. CT Results (most recent):  No results found for this or any previous visit. MRI Results (most recent):  Results from [place] encounter on 09/23/11    MRI ANKLE LEFT WITHOUT CONTRAST    Narrative  **Final Report**      ICD Codes / Adm. Diagnosis: 719.47  V45.89 / LEFT ANKLE PAIN  S/P  RECONSTRUCTION OF LIGAMENT  Examination:  MRI ANKLE WO CON LT  - [id number] - Sep 23 2011  6:52PM  Accession No:  [id number]  Reason:  left ankle pain/ status post lateral ligament reconstruction lt  ankle      REPORT:  INDICATION: Ankle pain post collateral ligament reconstruction.     EXAM: MRI left ankle without contrast.    Comparisons: None    Sequences include sagittal T1, sagittal, coronal, and axial T2 fat  saturation. Axial proton density with fat saturation, sagittal gradient  echo, sagittal STIR. There is motion on the study. Comparison 01/07/2009      Findings: There is susceptibility artifact within the calcaneus and fibula  due to prior lateral ankle ligament repair. Remaining marrow signal is  unremarkable given motion. There is marked edema in the subcutaneous tissues  especially of the distal leg but no drainable abscess collection. Alignment  of the ankle is normal. The talar dome is intact. There are no discrete  cartilage defect. No evidence of coalition. There is partial replacement of  the sinus tarsi fat unchanged. The plantar fascia is not thickened or  inflamed. There is a large amount of fluid within the flexor hallucis longus  tendon sheath. There is minimal tendinopathy of the Achilles insertion  unchanged. Given motion remaining tendons are otherwise unremarkable. Susceptibility artifact and patient makes evaluation of collateral ligament  repair difficult. The deltoid ligament is intact . IMPRESSION:  1. Severe edema in the subcutaneous tissues without evidence of drainable  abscess collection or osteomyelitis  2. Severe tenosynovitis of the flexor hallucis longus  3. Postsurgical change to the distal fibula from prior lateral ankle  ligament repair  4.  Soft tissue partially replacing the normal sinus tarsi fat unchanged        Signing/Reading Doctor: Bijal Israel ([id number])  Approved: Bijal Israel ([id number])  09/26/2011           Active Problems:    Cellulitis of groin (12/14/2022)        Assessment/Plan:   Cellulitis groin  -Infectious Disease consult  -IV Vancomycin   -Urology consult  -Surgery consult    Afib  -Xarelto 20 mg daily  -Metoprolol 12.5 mg BID    Hypertension  -Metoprolol 12.5 mg twice daily    DVT Prophylaxis: Xarelto  Code Status:Full code  POA/NOK:    Total Time spent in direct and indirect care including assessment review of labs and coordination of services and consultations: Greater than 75 minutes      Signed By: Brianna Blanchard NP     December 14, 2022

## 2022-12-14 NOTE — CONSULTS
Consult Date: 12/14/2022    Consults  Cellulitis groin    Subjective   This is a 47year old male known to me from September 2022 when followed for scrotal cellulitis, treated with Meropenem (poor response to Zosyn). Last exam revealed retracted penis, purulent drainage from suprapubic wound and mild scrotal swelling. He was restarted on IV Meropenem with expected follow-up in ID Clinic, however, he was never seen. Suprapubic wound grew Proteus mirabilis and Enterobacter aerogenes, both susceptible to Meropenem. Patient presented to ED today complaining of swollen testicles. He was afebrile with WBC 18,500 and elevated procal.  Urine was hazy with marked pyuria but no bacteria. US showed no testicular mass or torsion, but there was scrotal soft tissue swelling; no fluid collection or abscess. Images reviewed by me. Blood cultures were sent but no urine culture identified. Patient given dose of Vancomycin. ID has been consulted for this reason. Urology also consulted. Patient states that he had been doing well until yesterday when he felt that his testicles had become swollen. No fever or chills.     Past Medical History:   Diagnosis Date    DVT (deep venous thrombosis) ([facility] [address]     Hypertension 7/27/2020      Past Surgical History:   Procedure Laterality Date    HX ORTHOPAEDIC      Ankle    HX ORTHOPAEDIC      Tumors removed from left knee     HX UROLOGICAL      Penile surgery     Family History   Problem Relation Age of Onset    Hypertension Mother     Lung Disease Father       Social History     Tobacco Use    Smoking status: Never    Smokeless tobacco: Never   Substance Use Topics    Alcohol use: Not Currently       Current Facility-Administered Medications   Medication Dose Route Frequency Provider Last Rate Last Admin    morphine injection 4 mg  4 mg IntraVENous ONCE Johnnie Ortiz MD        ondansetron (ZOFRAN) injection 4 mg  4 mg IntraVENous Marco Franks MD        vancomycin (VANCOCIN) 2,000 mg in 0.9% sodium chloride 500 mL IVPB  2,000 mg IntraVENous ONCE Pettis, Andre Gottron,  mL/hr at 12/14/22 1355 2,000 mg at 12/14/22 1355    [START ON 12/15/2022] polyethylene glycol (MIRALAX) packet 17 g  17 g Oral DAILY Abeba De La O NP        ondansetron [facility]) injection 4 mg  4 mg IntraVENous Q6H PRN MEENA Jacome Derick ON 12/15/2022] docusate sodium (COLACE) capsule 100 mg  100 mg Oral DAILY Abeba De La O NP        acetaminophen (TYLENOL) tablet 650 mg  650 mg Oral Q4H PRN Abeba De La O NP        [START ON 12/15/2022] rivaroxaban (XARELTO) tablet 20 mg  20 mg Oral DAILY WITH BREAKFAST Abeba De La O NP        metoprolol tartrate (LOPRESSOR) tablet 12.5 mg  12.5 mg Oral Q12H Abeba De La O NP        hydrALAZINE (APRESOLINE) 20 mg/mL injection 10 mg  10 mg IntraVENous Q6H PRN Abeba De La O NP        HYDROmorphone (DILAUDID) injection 1 mg  1 mg IntraVENous Q4H PRN Abeba De La O NP            Review of Systems   Constitutional:  Negative for chills and fever. HENT: Negative. Eyes: Negative. Respiratory: Negative. Cardiovascular: Negative. Gastrointestinal: Negative. Genitourinary:  Positive for scrotal swelling and testicular pain. Negative for dysuria, flank pain, frequency, genital sores, penile discharge, penile pain and penile swelling. Musculoskeletal: Negative. Skin: Negative. Allergic/Immunologic: Negative. Neurological: Negative. Hematological: Negative. Psychiatric/Behavioral: Negative. Objective     Vital signs for last 24 hours:  Visit Vitals  /67 (BP 1 Location: Left lower arm, BP Patient Position: Lying)   Pulse 81   Temp 98.3 °F (36.8 °C)   Resp 18   Ht 6' (1.829 m)   Wt (!) 538 lb 14.4 oz (244.4 kg)   SpO2 100%   BMI 73.09 kg/m²       Intake/Output this shift:  Current Shift: No intake/output data recorded. Last 3 Shifts: No intake/output data recorded.     Data Review: Recent Results (from the past 24 hour(s))   METABOLIC PANEL, COMPREHENSIVE    Collection Time: 12/14/22 11:55 AM   Result Value Ref Range    Sodium 134 (L) 136 - 145 mmol/L    Potassium 4.4 3.5 - 5.1 mmol/L    Chloride 102 97 - 108 mmol/L    CO2 24 21 - 32 mmol/L    Anion gap 8 5 - 15 mmol/L    Glucose 99 65 - 100 mg/dL    BUN 11 6 - 20 mg/dL    Creatinine 1.01 0.70 - 1.30 mg/dL    BUN/Creatinine ratio 11 (L) 12 - 20      eGFR >60 >60 ml/min/1.73m2    Calcium 9.2 8.5 - 10.1 mg/dL    Bilirubin, total 1.3 (H) 0.2 - 1.0 mg/dL    AST (SGOT) 18 15 - 37 U/L    ALT (SGPT) 21 12 - 78 U/L    Alk. phosphatase 121 (H) 45 - 117 U/L    Protein, total 8.2 6.4 - 8.2 g/dL    Albumin 3.0 (L) 3.5 - 5.0 g/dL    Globulin 5.2 (H) 2.0 - 4.0 g/dL    A-G Ratio 0.6 (L) 1.1 - 2.2     LACTIC ACID    Collection Time: 12/14/22 11:55 AM   Result Value Ref Range    Lactic acid 1.6 0.4 - 2.0 mmol/L   PROCALCITONIN    Collection Time: 12/14/22 11:55 AM   Result Value Ref Range    Procalcitonin 0.20 (H) 0 ng/mL   CBC WITH AUTOMATED DIFF    Collection Time: 12/14/22 12:25 PM   Result Value Ref Range    WBC 18.5 (H) 4.1 - 11.1 K/uL    RBC 4.61 4.10 - 5.70 M/uL    HGB 12.8 12.1 - 17.0 g/dL    HCT 39.5 36.6 - 50.3 %    MCV 85.7 80.0 - 99.0 FL    MCH 27.8 26.0 - 34.0 PG    MCHC 32.4 30.0 - 36.5 g/dL    RDW 14.9 (H) 11.5 - 14.5 %    PLATELET 521 481 - 259 K/uL    MPV 8.9 8.9 - 12.9 FL    NRBC 0.0 0.0  WBC    ABSOLUTE NRBC 0.00 0.00 - 0.01 K/uL    NEUTROPHILS 83 (H) 32 - 75 %    LYMPHOCYTES 10 (L) 12 - 49 %    MONOCYTES 7 5 - 13 %    EOSINOPHILS 0 0 - 7 %    BASOPHILS 0 0 - 1 %    IMMATURE GRANULOCYTES 0 0 - 0.5 %    ABS. NEUTROPHILS 15.3 (H) 1.8 - 8.0 K/UL    ABS. LYMPHOCYTES 1.8 0.8 - 3.5 K/UL    ABS. MONOCYTES 1.2 (H) 0.0 - 1.0 K/UL    ABS. EOSINOPHILS 0.1 0.0 - 0.4 K/UL    ABS. BASOPHILS 0.1 0.0 - 0.1 K/UL    ABS. IMM.  GRANS. 0.1 (H) 0.00 - 0.04 K/UL    DF AUTOMATED     SED RATE (ESR)    Collection Time: 12/14/22 12:25 PM   Result Value Ref Range    Sed rate, automated 70 (H) 0 - 20 mm/hr   URINALYSIS W/ RFLX MICROSCOPIC    Collection Time: 12/14/22  2:07 PM   Result Value Ref Range    Color Yellow/Straw      Appearance Hazy (A) Clear      Specific gravity 1.015 1.003 - 1.030      pH (UA) 6.0      Protein Trace (A) Negative mg/dL    Glucose Negative Negative mg/dL    Ketone 15 (A) Negative mg/dL    Bilirubin Small (A) Negative      Blood Large (A) Negative      Urobilinogen >8.0 (H) 0.2 - 1.0 EU/dL    Nitrites Negative Negative      Leukocyte Esterase Large (A) Negative     URINE MICROSCOPIC    Collection Time: 12/14/22  2:07 PM   Result Value Ref Range    WBC >100 (H) 0 - 4 /hpf    RBC 20-50 0 - 5 /hpf    Bacteria Negative Negative /hpf     US Scrotum/Testicles (12/14)      Physical Exam  Vitals and nursing note reviewed. Constitutional:       Appearance: He is obese. He is ill-appearing. HENT:      Head: Normocephalic and atraumatic. Right Ear: External ear normal.      Left Ear: External ear normal.      Mouth/Throat:      Pharynx: Oropharynx is clear. Eyes:      Pupils: Pupils are equal, round, and reactive to light. Cardiovascular:      Rate and Rhythm: Normal rate and regular rhythm. Heart sounds: No murmur heard. Pulmonary:      Effort: Pulmonary effort is normal.      Breath sounds: Normal breath sounds. Abdominal:      General: Bowel sounds are normal. There is no distension. Palpations: Abdomen is soft. Tenderness: There is no abdominal tenderness. Genitourinary:     Penis: Normal.           Comments: Moderate scrotal swelling and erythema    Suprapubic ?wound  Musculoskeletal:      Cervical back: Neck supple. Right lower leg: Edema present. Left lower leg: Edema present. Skin:     Findings: No rash. Neurological:      General: No focal deficit present. Mental Status: He is oriented to person, place, and time.    Psychiatric:         Mood and Affect: Mood normal.         Behavior: Behavior normal.         Thought Content: Thought content normal.         Judgment: Judgment normal.       ASSESSMENT/PLAN    Moderated scrotal swelling and erythema without fluid collection  ? 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 Concern regarding recurrences and agree with Urology with need to interrogate his pelvic anatomy with CT scan, however, I think that his weight may be prohibitive. Continue IV Meropenem  Reasonable to discontinue Vancomycin for now  Follow-up blood cultures  Send urine culture  Wound culture taken from suprapubic region  6. CT Pelvis if feasible      Chad Gaston MD

## 2022-12-14 NOTE — ED PROVIDER NOTES
EMERGENCY DEPARTMENT HISTORY AND PHYSICAL EXAM      Date: 12/14/2022  Patient Name: Diego Spencer. History of Presenting Illness     Chief Complaint   Patient presents with    Groin Pain       History Provided By: Patient    HPI: Diego Spencer., 47 y.o. male with a past medical history significant diabetes presents to the ED with chief complaint of Groin Pain  . 71-year-old male morbidly obese recently admitted for cellulitis of the groin in August.  Presents with a recurrence of the symptoms although he did not let it go as long. Redness. Some difficulty voiding secondary to the groin pain. No nausea vomiting diarrhea. No dizziness or passing out. Compliant with medications otherwise not on antibiotics. There are no other complaints, changes, or physical findings at this time.     PCP: None    Current Facility-Administered Medications   Medication Dose Route Frequency Provider Last Rate Last Admin    morphine injection 4 mg  4 mg IntraVENous ONCE Anni Ortiz MD        ondansetron [facility]) injection 4 mg  4 mg IntraVENous ONCE Ashley PITTMAN MD        vancomycin (VANCOCIN) 2,000 mg in 0.9% sodium chloride 500 mL IVPB  2,000 mg IntraVENous ONCE Anni Ortiz  mL/hr at 12/14/22 1355 2,000 mg at 12/14/22 1355       Past History     Past Medical History:  Past Medical History:   Diagnosis Date    DVT (deep venous thrombosis) ([facility] [address]     Hypertension 7/27/2020       Past Surgical History:  Past Surgical History:   Procedure Laterality Date    HX ORTHOPAEDIC      Ankle    HX ORTHOPAEDIC      Tumors removed from left knee     HX UROLOGICAL      Penile surgery       Family History:  Family History   Problem Relation Age of Onset    Hypertension Mother     Lung Disease Father        Social History:  Social History     Tobacco Use    Smoking status: Never    Smokeless tobacco: Never   Substance Use Topics    Alcohol use: Not Currently    Drug use: Not Currently Allergies:  No Known Allergies      Review of Systems   Review of Systems   Constitutional: Negative. Negative for chills, fatigue and fever. HENT: Negative. Negative for congestion, ear pain, nosebleeds and sore throat. Eyes: Negative. Negative for pain, discharge and visual disturbance. Respiratory: Negative. Negative for cough, chest tightness and shortness of breath. Cardiovascular: Negative. Negative for chest pain and leg swelling. Gastrointestinal: Negative. Negative for abdominal pain, blood in stool, constipation, diarrhea, nausea and vomiting. Endocrine: Negative. Genitourinary: Negative. Negative for difficulty urinating, dysuria and flank pain. Musculoskeletal: Negative. Negative for back pain and myalgias. Skin:  Positive for wound. Negative for rash. Allergic/Immunologic: Negative. Neurological: Negative. Negative for dizziness, syncope, weakness, numbness and headaches. Hematological: Negative. Does not bruise/bleed easily. Psychiatric/Behavioral: Negative. Negative for agitation, confusion, hallucinations and suicidal ideas. All other systems reviewed and are negative. Physical Exam   Patient Vitals for the past 24 hrs:   Temp Pulse Resp BP SpO2   12/14/22 1353 98.3 °F (36.8 °C) 81 18 132/67 100 %   12/14/22 1036 98.2 °F (36.8 °C) 85 19 132/76 99 %         Physical Exam  Vitals and nursing note reviewed. Constitutional:       General: He is not in acute distress. Appearance: He is normal weight. He is not ill-appearing. HENT:      Head: Normocephalic and atraumatic. Right Ear: External ear normal.      Left Ear: External ear normal.      Nose: Nose normal. No rhinorrhea. Mouth/Throat:      Mouth: Mucous membranes are moist.      Pharynx: Oropharynx is clear. Eyes:      Extraocular Movements: Extraocular movements intact. Conjunctiva/sclera: Conjunctivae normal.      Pupils: Pupils are equal, round, and reactive to light. Cardiovascular:      Rate and Rhythm: Normal rate and regular rhythm. Pulses: Normal pulses. Heart sounds: Normal heart sounds. Pulmonary:      Effort: Pulmonary effort is normal. No respiratory distress. Breath sounds: Normal breath sounds. Abdominal:      General: Abdomen is flat. Bowel sounds are normal.      Palpations: Abdomen is soft. Musculoskeletal:         General: No tenderness or deformity. Normal range of motion. Cervical back: Normal range of motion and neck supple. Skin:     General: Skin is warm and dry. Capillary Refill: Capillary refill takes less than 2 seconds. Findings: Erythema present. No bruising, lesion or rash. Neurological:      General: No focal deficit present. Mental Status: He is alert and oriented to person, place, and time. Mental status is at baseline. Psychiatric:         Mood and Affect: Mood normal.         Behavior: Behavior normal.         Thought Content: Thought content normal.         Judgment: Judgment normal.       Diagnostic Study Results     Labs -  Recent Results (from the past 24 hour(s))   METABOLIC PANEL, COMPREHENSIVE    Collection Time: 12/14/22 11:55 AM   Result Value Ref Range    Sodium 134 (L) 136 - 145 mmol/L    Potassium 4.4 3.5 - 5.1 mmol/L    Chloride 102 97 - 108 mmol/L    CO2 24 21 - 32 mmol/L    Anion gap 8 5 - 15 mmol/L    Glucose 99 65 - 100 mg/dL    BUN 11 6 - 20 mg/dL    Creatinine 1.01 0.70 - 1.30 mg/dL    BUN/Creatinine ratio 11 (L) 12 - 20      eGFR >60 >60 ml/min/1.73m2    Calcium 9.2 8.5 - 10.1 mg/dL    Bilirubin, total 1.3 (H) 0.2 - 1.0 mg/dL    AST (SGOT) 18 15 - 37 U/L    ALT (SGPT) 21 12 - 78 U/L    Alk.  phosphatase 121 (H) 45 - 117 U/L    Protein, total 8.2 6.4 - 8.2 g/dL    Albumin 3.0 (L) 3.5 - 5.0 g/dL    Globulin 5.2 (H) 2.0 - 4.0 g/dL    A-G Ratio 0.6 (L) 1.1 - 2.2     LACTIC ACID    Collection Time: 12/14/22 11:55 AM   Result Value Ref Range    Lactic acid 1.6 0.4 - 2.0 mmol/L PROCALCITONIN    Collection Time: 12/14/22 11:55 AM   Result Value Ref Range    Procalcitonin 0.20 (H) 0 ng/mL   CBC WITH AUTOMATED DIFF    Collection Time: 12/14/22 12:25 PM   Result Value Ref Range    WBC 18.5 (H) 4.1 - 11.1 K/uL    RBC 4.61 4.10 - 5.70 M/uL    HGB 12.8 12.1 - 17.0 g/dL    HCT 39.5 36.6 - 50.3 %    MCV 85.7 80.0 - 99.0 FL    MCH 27.8 26.0 - 34.0 PG    MCHC 32.4 30.0 - 36.5 g/dL    RDW 14.9 (H) 11.5 - 14.5 %    PLATELET 118 058 - 295 K/uL    MPV 8.9 8.9 - 12.9 FL    NRBC 0.0 0.0  WBC    ABSOLUTE NRBC 0.00 0.00 - 0.01 K/uL    NEUTROPHILS 83 (H) 32 - 75 %    LYMPHOCYTES 10 (L) 12 - 49 %    MONOCYTES 7 5 - 13 %    EOSINOPHILS 0 0 - 7 %    BASOPHILS 0 0 - 1 %    IMMATURE GRANULOCYTES 0 0 - 0.5 %    ABS. NEUTROPHILS 15.3 (H) 1.8 - 8.0 K/UL    ABS. LYMPHOCYTES 1.8 0.8 - 3.5 K/UL    ABS. MONOCYTES 1.2 (H) 0.0 - 1.0 K/UL    ABS. EOSINOPHILS 0.1 0.0 - 0.4 K/UL    ABS. BASOPHILS 0.1 0.0 - 0.1 K/UL    ABS. IMM. GRANS. 0.1 (H) 0.00 - 0.04 K/UL    DF AUTOMATED               Radiologic Studies -   US SCROTUM/TESTICLES   Final Result      1. No testicular mass or torsion. 2. Again seen is scrotal soft tissue swelling which may represent edema or   cellulitis. No fluid collection or abscess is seen. CT Results  (Last 48 hours)      None          CXR Results  (Last 48 hours)      None            Medical Decision Making and ED Course   I am the first provider for this patient. I reviewed the vital signs, available nursing notes, past medical history, past surgical history, family history and social history. Vital Signs-Reviewed the patient's vital signs. Patient Vitals for the past 24 hrs:   Temp Pulse Resp BP SpO2   12/14/22 1353 98.3 °F (36.8 °C) 81 18 132/67 100 %   12/14/22 1036 98.2 °F (36.8 °C) 85 19 132/76 99 %         EKG interpretation:         Records Reviewed: Previous Hospital chart. EMS run report      ED Course:   Initial assessment performed.  The patients presenting problems have been discussed, and they are in agreement with the care plan formulated and outlined with them. I have encouraged them to ask questions as they arise throughout their visit. Orders Placed This Encounter    CULTURE, BLOOD, PAIRED     Standing Status:   Standing     Number of Occurrences:   1    CULTURE, BLOOD     Standing Status:   Standing     Number of Occurrences:   1    US SCROTUM/TESTICLES     Standing Status:   Standing     Number of Occurrences:   1     Order Specific Question:   Transport     Answer:   BED [2]     Order Specific Question:   Reason for Exam     Answer:   swelling    COMPREHENSIVE METABOLIC PANEL     Standing Status:   Standing     Number of Occurrences:   1    LACTIC ACID, PLASMA     If lactic acid level is greater than 2, then a repeat lactic acid level is to be drawn in 6 hours. Standing Status:   Standing     Number of Occurrences:   1    LACTIC ACID, PLASMA     If initial lactic acid level is greater than 2, then a repeat lactic acid level is to be drawn in 6 hours. Standing Status:   Standing     Number of Occurrences:   39035    CRP, HIGH SENSITIVITY     Standing Status:   Standing     Number of Occurrences:   1    URINALYSIS W/ RFLX MICROSCOPIC     Standing Status:   Standing     Number of Occurrences:   1    PROCALCITONIN     Standing Status:   Standing     Number of Occurrences:   1    CBC WITH AUTOMATED DIFF     Standing Status:   Standing     Number of Occurrences:   1    SED RATE (ESR)     Standing Status:   Standing     Number of Occurrences:   1    VITAL SIGNS     Standing Status:   Standing     Number of Occurrences:   1    IP CONSULT TO UROLOGY     Standing Status:   Standing     Number of Occurrences:   1     Order Specific Question:   Reason for Consult: Answer:   cellulitis groin     Order Specific Question:   Did you call or speak to the consulting provider?      Answer:   No     Order Specific Question:   Consult To     Answer:   neeraj Order Specific Question:   Schedule When? Answer:   TODAY    EKG, 12 LEAD, INITIAL     Standing Status:   Standing     Number of Occurrences:   1     Order Specific Question:   Reason for Exam:     Answer:   afib hx    SALINE LOCK IV ONE TIME STAT     Standing Status:   Standing     Number of Occurrences:   1    morphine injection 4 mg    ondansetron (ZOFRAN) injection 4 mg    cefTRIAXone (ROCEPHIN) 1 g in sterile water (preservative free) 10 mL IV syringe     Order Specific Question:   Antibiotic Indications     Answer:   Urinary Tract Infection    DISCONTD: vancomycin (VANCOCIN) 1,000 mg in 0.9% sodium chloride 250 mL (Conq1Smy)     Order Specific Question:   Antibiotic Indications     Answer:   Skin and Soft Tissue Infection    vancomycin (VANCOCIN) 2,000 mg in 0.9% sodium chloride 500 mL IVPB     Order Specific Question:   Antibiotic Indications     Answer:   Skin and Soft Tissue Infection    IP CONSULT TO GENERAL SURGERY     Standing Status:   Standing     Number of Occurrences:   1     Order Specific Question:   Reason for Consult: Answer:   groin cellulitis     Order Specific Question:   Did you call or speak to the consulting provider? Answer:   No     Order Specific Question:   Consult To     Answer:   Can hWalen     Order Specific Question:   Schedule When? Answer:   TODAY                 Provider Notes (Medical Decision Making):   -year-old male presents with groin cellulitis. Unable to fit in the Scanner. Will admit for surgery urology consult. Antibiotics although I do not feel its 40 years gangrene at this point. Medical admit. Consults    Augutsa Luna admit    Maida Perry consult    Diana Andrade consult          Admitted    Procedures               Disposition       Emergency Department Disposition:  Admitted      Diagnosis     Clinical Impression:   1.  Cellulitis of groin        Attestations:    Iza Yancey MD    Please note that this dictation was completed with [employer], the computer voice recognition software. Quite often unanticipated grammatical, syntax, homophones, and other interpretive errors are inadvertently transcribed by the computer software. Please disregard these errors. Please excuse any errors that have escaped final proofreading. Thank you.

## 2022-12-14 NOTE — CONSULTS
UROLOGY CONSULT NOTE  [phone]        Patient: Merle Mortimer. MRN: [id number]  SSN: [id number]    YOB: 1970  Age: 47 y.o. Sex: male      REFERRING PROVIDER: carlos  Subjective:      Merle Mortimer. is a 47 y.o. 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 The margins of the tumor were clean but there were no lymph nodes sent. There is no record of follow-up CT scan which was requested back in September 2020 at [facility]. These cancers tend to be very necrotic, and it is bothersome that he continues to have infection of the spot which was cleaned out 3 years ago. Do we have a recurrence of the cancer? Do we have a nidus for infection? Do we have osteomyelitis?   Do we have a fistula from the urethra to his area given he had the urine culture positive for Proteus last visit as well as the scrotal infection with the same organism    Past Medical History:   Diagnosis Date    DVT (deep venous thrombosis) ([facility] [address]     Hypertension 7/27/2020     Past Surgical History:   Procedure Laterality Date    HX ORTHOPAEDIC      Ankle    HX ORTHOPAEDIC      Tumors removed from left knee     HX UROLOGICAL      Penile surgery      Family History   Problem Relation Age of Onset    Hypertension Mother     Lung Disease Father      Social History     Tobacco Use    Smoking status: Never    Smokeless tobacco: Never   Substance Use Topics    Alcohol use: Not Currently      Current Facility-Administered Medications   Medication Dose Route Frequency Provider Last Rate Last Admin    morphine injection 4 mg  4 mg IntraVENous ONCE Johnnie Ortiz MD        ondansetron [facility]) injection 4 mg  4 mg IntraVENous ONCE Johnnie Ortiz MD        [START ON 12/15/2022] polyethylene glycol (MIRALAX) packet 17 g  17 g Oral DAILY Gagandeep Johansen NP        ondansetron [facility]) injection 4 mg  4 mg IntraVENous Q6H PRN Neo Killer, NP        [START ON 12/15/2022] docusate sodium (COLACE) capsule 100 mg  100 mg Oral DAILY Neo Killer, NP        acetaminophen (TYLENOL) tablet 650 mg  650 mg Oral Q4H PRN Neo Killer, NP        [START ON 12/15/2022] rivaroxaban (XARELTO) tablet 20 mg  20 mg Oral DAILY WITH BREAKFAST Neo Killer, NP        metoprolol tartrate (LOPRESSOR) tablet 12.5 mg  12.5 mg Oral Q12H Neo Killer, NP        hydrALAZINE (APRESOLINE) 20 mg/mL injection 10 mg  10 mg IntraVENous Q6H PRN Neo Killer, NP        HYDROmorphone (DILAUDID) injection 1 mg  1 mg IntraVENous Q4H PRN Milad Macias MD        meropenem (MERREM) 1 g in 0.9% sodium chloride (MBP/ADV) 50 mL MBP  1 g IntraVENous Q8H Michelle Harrell  mL/hr at 12/14/22 1705 1 g at 12/14/22 1705        No Known Allergies    Review of Systems:  ROS  Review of Systems   Constitutional: Negative. Negative for chills, fatigue and fever. HENT: Negative. Negative for congestion, ear pain, nosebleeds and sore throat. Eyes: Negative. Negative for pain, discharge and visual disturbance. Respiratory: Negative. Negative for cough, chest tightness and shortness of breath. Cardiovascular: Negative. Negative for chest pain and leg swelling. Gastrointestinal: Negative. Negative for abdominal pain, blood in stool, constipation, diarrhea, nausea and vomiting. Endocrine: Negative. Genitourinary: Negative. Negative for difficulty urinating, dysuria and flank pain. Musculoskeletal: Negative. Negative for back pain and myalgias. Skin:  Positive for wound. Negative for rash. Allergic/Immunologic: Negative. Neurological: Negative. Negative for dizziness, syncope, weakness, numbness and headaches. Hematological: Negative. Does not bruise/bleed easily. Psychiatric/Behavioral: Negative. Negative for agitation, confusion, hallucinations and suicidal ideas.     All other systems reviewed and are negative. Objective:     Vitals:    12/14/22 1036 12/14/22 1353 12/14/22 1711   BP: 132/76 132/67 110/74   Pulse: 85 81 72   Resp: 19 18    Temp: 98.2 °F (36.8 °C) 98.3 °F (36.8 °C)    SpO2: 99% 100% 99%   Weight: (!) 538 lb 14.4 oz (244.4 kg)     Height: 6' (1.829 m)          Recent Labs     12/14/22  1225   WBC 18.5*   HGB 12.8   HCT 39.5        Recent Labs     12/14/22  1155   *   K 4.4      CO2 24   GLU 99   BUN 11   CREA 1.01   CA 9.2   ALB 3.0*   TBILI 1.3*   ALT 21     No results for input(s): PH, PCO2, PO2, HCO3, FIO2 in the last 72 hours. 24 Hour Results:  Recent Results (from the past 24 hour(s))   METABOLIC PANEL, COMPREHENSIVE    Collection Time: 12/14/22 11:55 AM   Result Value Ref Range    Sodium 134 (L) 136 - 145 mmol/L    Potassium 4.4 3.5 - 5.1 mmol/L    Chloride 102 97 - 108 mmol/L    CO2 24 21 - 32 mmol/L    Anion gap 8 5 - 15 mmol/L    Glucose 99 65 - 100 mg/dL    BUN 11 6 - 20 mg/dL    Creatinine 1.01 0.70 - 1.30 mg/dL    BUN/Creatinine ratio 11 (L) 12 - 20      eGFR >60 >60 ml/min/1.73m2    Calcium 9.2 8.5 - 10.1 mg/dL    Bilirubin, total 1.3 (H) 0.2 - 1.0 mg/dL    AST (SGOT) 18 15 - 37 U/L    ALT (SGPT) 21 12 - 78 U/L    Alk.  phosphatase 121 (H) 45 - 117 U/L    Protein, total 8.2 6.4 - 8.2 g/dL    Albumin 3.0 (L) 3.5 - 5.0 g/dL    Globulin 5.2 (H) 2.0 - 4.0 g/dL    A-G Ratio 0.6 (L) 1.1 - 2.2     LACTIC ACID    Collection Time: 12/14/22 11:55 AM   Result Value Ref Range    Lactic acid 1.6 0.4 - 2.0 mmol/L   PROCALCITONIN    Collection Time: 12/14/22 11:55 AM   Result Value Ref Range    Procalcitonin 0.20 (H) 0 ng/mL   CBC WITH AUTOMATED DIFF    Collection Time: 12/14/22 12:25 PM   Result Value Ref Range    WBC 18.5 (H) 4.1 - 11.1 K/uL    RBC 4.61 4.10 - 5.70 M/uL    HGB 12.8 12.1 - 17.0 g/dL    HCT 39.5 36.6 - 50.3 %    MCV 85.7 80.0 - 99.0 FL    MCH 27.8 26.0 - 34.0 PG    MCHC 32.4 30.0 - 36.5 g/dL    RDW 14.9 (H) 11.5 - 14.5 %    PLATELET 326 150 - 400 K/uL    MPV 8.9 8.9 - 12.9 FL    NRBC 0.0 0.0  WBC    ABSOLUTE NRBC 0.00 0.00 - 0.01 K/uL    NEUTROPHILS 83 (H) 32 - 75 %    LYMPHOCYTES 10 (L) 12 - 49 %    MONOCYTES 7 5 - 13 %    EOSINOPHILS 0 0 - 7 %    BASOPHILS 0 0 - 1 %    IMMATURE GRANULOCYTES 0 0 - 0.5 %    ABS. NEUTROPHILS 15.3 (H) 1.8 - 8.0 K/UL    ABS. LYMPHOCYTES 1.8 0.8 - 3.5 K/UL    ABS. MONOCYTES 1.2 (H) 0.0 - 1.0 K/UL    ABS. EOSINOPHILS 0.1 0.0 - 0.4 K/UL    ABS. BASOPHILS 0.1 0.0 - 0.1 K/UL    ABS. IMM. GRANS. 0.1 (H) 0.00 - 0.04 K/UL    DF AUTOMATED     SED RATE (ESR)    Collection Time: 12/14/22 12:25 PM   Result Value Ref Range    Sed rate, automated 70 (H) 0 - 20 mm/hr   URINALYSIS W/ RFLX MICROSCOPIC    Collection Time: 12/14/22  2:07 PM   Result Value Ref Range    Color Yellow/Straw      Appearance Hazy (A) Clear      Specific gravity 1.015 1.003 - 1.030      pH (UA) 6.0      Protein Trace (A) Negative mg/dL    Glucose Negative Negative mg/dL    Ketone 15 (A) Negative mg/dL    Bilirubin Small (A) Negative      Blood Large (A) Negative      Urobilinogen >8.0 (H) 0.2 - 1.0 EU/dL    Nitrites Negative Negative      Leukocyte Esterase Large (A) Negative     URINE MICROSCOPIC    Collection Time: 12/14/22  2:07 PM   Result Value Ref Range    WBC >100 (H) 0 - 4 /hpf    RBC 20-50 0 - 5 /hpf    Bacteria Negative Negative /hpf       Physical Exam:  Physical Exam  Vitals and nursing note reviewed. Constitutional:       General: He is not in acute distress. Appearance: He is normal weight. He is not ill-appearing. HENT:      Head: Normocephalic and atraumatic. Right Ear: External ear normal.      Left Ear: External ear normal.      Nose: Nose normal. No rhinorrhea. Mouth/Throat:      Mouth: Mucous membranes are moist.      Pharynx: Oropharynx is clear. Eyes:      Extraocular Movements: Extraocular movements intact.       Conjunctiva/sclera: Conjunctivae normal.      Pupils: Pupils are equal, round, and reactive to light. Cardiovascular:      Rate and Rhythm: Normal rate and regular rhythm. Pulses: Normal pulses. Heart sounds: Normal heart sounds. Pulmonary:      Effort: Pulmonary effort is normal. No respiratory distress. Breath sounds: Normal breath sounds. Abdominal:      General: Abdomen is flat. Bowel sounds are normal.      Palpations: Abdomen is soft. Musculoskeletal:         General: No tenderness or deformity. Normal range of motion. Cervical back: Normal range of motion and neck supple. Skin:     General: Skin is warm and dry. Capillary Refill: Capillary refill takes less than 2 seconds. Findings: Erythema present. No bruising, lesion or rash. Neurological:      General: No focal deficit present. Mental Status: He is alert and oriented to person, place, and time. Mental status is at baseline. Psychiatric:         Mood and Affect: Mood normal.         Behavior: Behavior normal.         Thought Content: Thought content normal.         Judgment: Judgment normal.        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 Ultrasound shows no abscess in his scrotum. Patient needs CT scan possible MRI. The issue of course is his weight of 538 pounds. Patient may need to be transferred back to [facility] where he was operated on if they have the capabilities to perform the x-rays and this patient needs     Hospital Problems  Date Reviewed: 8/18/2022            Codes Class Noted POA    Cellulitis of groin ICD-10-CM: L03.314  ICD-9-CM: 682.2  12/14/2022 Unknown           Plan: Cultures of urine, CT scan, and possible MRI after the CT scan if CT scan does not show anything of note.   Patient needs IV antibiotics at the least as well talk to Dr. Melani Mayer Problems  Date Reviewed: 8/18/2022            Codes Class Noted POA    Cellulitis of groin ICD-10-CM: Y53.940  ICD-9-CM: 682.2  12/14/2022 Unknown           Signed By: Page Banuelos MD     December 14, 2022

## 2022-12-15 LAB
ALBUMIN SERPL-MCNC: 3 G/DL (ref 3.5–5)
ALBUMIN/GLOB SERPL: 0.6 {RATIO} (ref 1.1–2.2)
ALP SERPL-CCNC: 118 U/L (ref 45–117)
ALT SERPL-CCNC: 19 U/L (ref 12–78)
ANION GAP SERPL CALC-SCNC: 7 MMOL/L (ref 5–15)
AST SERPL W P-5'-P-CCNC: 6 U/L (ref 15–37)
BASOPHILS # BLD: 0.1 K/UL (ref 0–0.1)
BASOPHILS NFR BLD: 0 % (ref 0–1)
BILIRUB SERPL-MCNC: 1 MG/DL (ref 0.2–1)
BUN SERPL-MCNC: 12 MG/DL (ref 6–20)
BUN/CREAT SERPL: 12 (ref 12–20)
CA-I BLD-MCNC: 9.2 MG/DL (ref 8.5–10.1)
CHLORIDE SERPL-SCNC: 101 MMOL/L (ref 97–108)
CO2 SERPL-SCNC: 26 MMOL/L (ref 21–32)
CREAT SERPL-MCNC: 0.98 MG/DL (ref 0.7–1.3)
CRP SERPL-MCNC: 19 MG/DL (ref 0–0.6)
DIFFERENTIAL METHOD BLD: ABNORMAL
EOSINOPHIL # BLD: 0.1 K/UL (ref 0–0.4)
EOSINOPHIL NFR BLD: 1 % (ref 0–7)
ERYTHROCYTE [DISTWIDTH] IN BLOOD BY AUTOMATED COUNT: 14.6 % (ref 11.5–14.5)
GLOBULIN SER CALC-MCNC: 5.1 G/DL (ref 2–4)
GLUCOSE SERPL-MCNC: 92 MG/DL (ref 65–100)
HCT VFR BLD AUTO: 35.7 % (ref 36.6–50.3)
HGB BLD-MCNC: 11.4 G/DL (ref 12.1–17)
IMM GRANULOCYTES # BLD AUTO: 0 K/UL (ref 0–0.04)
IMM GRANULOCYTES NFR BLD AUTO: 0 % (ref 0–0.5)
LYMPHOCYTES # BLD: 1.5 K/UL (ref 0.8–3.5)
LYMPHOCYTES NFR BLD: 11 % (ref 12–49)
MCH RBC QN AUTO: 27.7 PG (ref 26–34)
MCHC RBC AUTO-ENTMCNC: 31.9 G/DL (ref 30–36.5)
MCV RBC AUTO: 86.7 FL (ref 80–99)
MONOCYTES # BLD: 0.8 K/UL (ref 0–1)
MONOCYTES NFR BLD: 6 % (ref 5–13)
NEUTS SEG # BLD: 11.2 K/UL (ref 1.8–8)
NEUTS SEG NFR BLD: 82 % (ref 32–75)
NRBC # BLD: 0 K/UL (ref 0–0.01)
NRBC BLD-RTO: 0 PER 100 WBC
PLATELET # BLD AUTO: 362 K/UL (ref 150–400)
PMV BLD AUTO: 9 FL (ref 8.9–12.9)
POTASSIUM SERPL-SCNC: 3.7 MMOL/L (ref 3.5–5.1)
PROCALCITONIN SERPL-MCNC: 0.44 NG/ML
PROT SERPL-MCNC: 8.1 G/DL (ref 6.4–8.2)
RBC # BLD AUTO: 4.12 M/UL (ref 4.1–5.7)
SODIUM SERPL-SCNC: 134 MMOL/L (ref 136–145)
WBC # BLD AUTO: 13.8 K/UL (ref 4.1–11.1)

## 2022-12-15 PROCEDURE — 86140 C-REACTIVE PROTEIN: CPT

## 2022-12-15 PROCEDURE — 87040 BLOOD CULTURE FOR BACTERIA: CPT

## 2022-12-15 PROCEDURE — 74011250636 HC RX REV CODE- 250/636: Performed by: INTERNAL MEDICINE

## 2022-12-15 PROCEDURE — 36415 COLL VENOUS BLD VENIPUNCTURE: CPT

## 2022-12-15 PROCEDURE — 85025 COMPLETE CBC W/AUTO DIFF WBC: CPT

## 2022-12-15 PROCEDURE — 80053 COMPREHEN METABOLIC PANEL: CPT

## 2022-12-15 PROCEDURE — 84145 PROCALCITONIN (PCT): CPT

## 2022-12-15 PROCEDURE — 99232 SBSQ HOSP IP/OBS MODERATE 35: CPT | Performed by: INTERNAL MEDICINE

## 2022-12-15 PROCEDURE — 65270000029 HC RM PRIVATE

## 2022-12-15 PROCEDURE — 74011250637 HC RX REV CODE- 250/637: Performed by: NURSE PRACTITIONER

## 2022-12-15 PROCEDURE — 74011000258 HC RX REV CODE- 258: Performed by: INTERNAL MEDICINE

## 2022-12-15 RX ORDER — DIGOXIN 125 MCG
250 TABLET ORAL DAILY
Status: ON HOLD | COMMUNITY
End: 2022-12-19 | Stop reason: SDUPTHER

## 2022-12-15 RX ORDER — OXYCODONE HYDROCHLORIDE 5 MG/1
5 TABLET ORAL
COMMUNITY
End: 2022-12-19

## 2022-12-15 RX ORDER — LEVOFLOXACIN 750 MG/1
750 TABLET ORAL DAILY
COMMUNITY
End: 2022-12-19

## 2022-12-15 RX ORDER — FUROSEMIDE 20 MG/1
20 TABLET ORAL DAILY
Status: ON HOLD | COMMUNITY
End: 2022-12-19 | Stop reason: SDUPTHER

## 2022-12-15 RX ORDER — METOPROLOL TARTRATE 25 MG/1
25 TABLET, FILM COATED ORAL ONCE
COMMUNITY
End: 2022-12-19

## 2022-12-15 RX ORDER — DILTIAZEM HYDROCHLORIDE 120 MG/1
120 CAPSULE, EXTENDED RELEASE ORAL DAILY
Status: ON HOLD | COMMUNITY
End: 2022-12-19 | Stop reason: SDUPTHER

## 2022-12-15 RX ADMIN — MEROPENEM 1 G: 1 INJECTION, POWDER, FOR SOLUTION INTRAVENOUS at 16:12

## 2022-12-15 RX ADMIN — METOPROLOL TARTRATE 12.5 MG: 25 TABLET, FILM COATED ORAL at 08:48

## 2022-12-15 RX ADMIN — ACETAMINOPHEN 650 MG: 325 TABLET, FILM COATED ORAL at 18:14

## 2022-12-15 RX ADMIN — DOCUSATE SODIUM 100 MG: 100 CAPSULE, LIQUID FILLED ORAL at 08:49

## 2022-12-15 RX ADMIN — RIVAROXABAN 20 MG: 20 TABLET, FILM COATED ORAL at 08:48

## 2022-12-15 RX ADMIN — MEROPENEM 1 G: 1 INJECTION, POWDER, FOR SOLUTION INTRAVENOUS at 23:40

## 2022-12-15 RX ADMIN — MEROPENEM 1 G: 1 INJECTION, POWDER, FOR SOLUTION INTRAVENOUS at 08:48

## 2022-12-15 RX ADMIN — MEROPENEM 1 G: 1 INJECTION, POWDER, FOR SOLUTION INTRAVENOUS at 00:22

## 2022-12-15 RX ADMIN — METOPROLOL TARTRATE 12.5 MG: 25 TABLET, FILM COATED ORAL at 21:33

## 2022-12-15 NOTE — PROGRESS NOTES
Progress Note    Patient: Radha Linares MRN: [id number]  SSN: [id number]    YOB: 1970  Age: 47 y.o. Sex: male      Admit Date: 12/14/2022    LOS: 1 day     Subjective:   Patient followed for sepsis with scrotal cellulitis and supected UTI. Objective:     Vitals:    12/15/22 0039 12/15/22 0344 12/15/22 0754 12/15/22 0800   BP: 115/61 (!) 106/58 104/66    Pulse: 78 81 84 84   Resp: 18 18 18    Temp: 98.8 °F (37.1 °C) 98.6 °F (37 °C) 97.7 °F (36.5 °C)    SpO2:   95%    Weight:       Height:            Intake and Output:  Current Shift: No intake/output data recorded. Last three shifts: No intake/output data recorded. Physical Exam:   Vitals and nursing note reviewed. Constitutional:       Appearance: He is obese. He is ill-appearing. HENT:      Head: Normocephalic and atraumatic. Right Ear: External ear normal.      Left Ear: External ear normal.      Mouth/Throat:      Pharynx: Oropharynx is clear. Eyes:      Pupils: Pupils are equal, round, and reactive to light. Cardiovascular:      Rate and Rhythm: Normal rate and regular rhythm. Heart sounds: No murmur heard. Pulmonary:      Effort: Pulmonary effort is normal.      Breath sounds: Normal breath sounds. Abdominal:      General: Bowel sounds are normal. There is no distension. Palpations: Abdomen is soft. Tenderness: There is no abdominal tenderness. Genitourinary:     Penis: Normal.          Comments: Moderate scrotal swelling and erythema     Suprapubic ?wound  Musculoskeletal:      Cervical back: Neck supple. Right lower leg: Edema present. Left lower leg: Edema present. Skin:     Findings: No rash. Neurological:      General: No focal deficit present. Mental Status: He is oriented to person, place, and time. Psychiatric:         Mood and Affect: Mood normal.         Behavior: Behavior normal.         Thought Content:  Thought content normal.         Judgment: Judgment normal.     Lab/Data Review:     WBC 13,800     CRP 19.00  Procal 0.44 <0.20    Blood cultures (12/14) in process  Blood cultures (12/15) in process  Wound groin (12/14) in process    Assessment:     Active Problems:    Cellulitis of groin (12/14/2022)    Scrotal cellulitis, with moderate scrotal swelling and erythema without fluid collection, Day #2 Meropenem  ? 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 Concern regarding recurrences and agree with Urology with need to interrogate his pelvic anatomy with CT scan, however, I think that his weight may be prohibitive. WBC is decreasing.     Plan:   Continue IV Meropenem  Follow-up blood and wound cultures  Awating urine culture   Awaiting CT Pelvis if feasible       Signed By: Ulises Bentley MD     December 15, 2022

## 2022-12-15 NOTE — PROGRESS NOTES
Patient brought in medication list from home. RN updated listed. Patient stated he has been out of medications and has not had any for 4 weeks.  notified of medication list updated.

## 2022-12-15 NOTE — PROGRESS NOTES
Skin admission assessment done with Rita Roche.  Redness/Yeast noted to some of pts folds in his legs. Purulent drainage from penis. No bariatric bed available tonight for the pt. A bed should be available 12/15/22.

## 2022-12-15 NOTE — PROGRESS NOTES
Physician Progress Note      Ann-Marie Pollack  CSN #:                  [id number]  :                       1970  ADMIT DATE:       2022 10:17 AM  DISCH DATE:  RESPONDING  PROVIDER #:        Kylie Sen NP          QUERY TEXT:    Dave Servin    Pt admitted with cellulitis of the groin. Noted documentation of sepsis by Infectious Disease consultant on 22. If possible, please document in progress notes and discharge summary:    The medical record reflects the following:  Risk Factors: 47 M presents with testicular edema; patient admitted with cellulitis of groin  Clinical Indicators: per Infectious Disease consultant note dated 22, \"Sepsis with leukocytosis and elevated procal\"; WBC 18.5. .. 13.8; CRP 19.00; procalcitonin 0.20. .. 0. 44; afebrile, vital signs stable; per Urology consultant note dated , \"Postop complication from partial penectomy and groin exploration performed 3 years ago which was never really healed\"  Treatment: labs, ultrasound of testicles, Urology consult, ID consult, IV ABT    Thank you,  MALU PollardN, RN, CRCR  Clinical   [email or web address] or contact via [employer]  Options provided:  -- Sepsis confirmed present on admission  -- Sepsis ruled out  -- Other - I will add my own diagnosis  -- Disagree - Not applicable / Not valid  -- Disagree - Clinically unable to determine / Unknown  -- Refer to Clinical Documentation Reviewer    PROVIDER RESPONSE TEXT:    The diagnosis of sepsis was confirmed as present on admission. Query created by: Do Fernandez on 12/15/2022 1:09 PM      QUERY TEXT:    Dave Servin    Pt admitted with cellulitis of the groin. Pt noted to have DM type 2. If possible, please document in progress notes and discharge summary the relationship, if any, between cellulitis and DM.     The medical record reflects the following:  Risk Factors: 47 M presents with testicular edema; patient admitted with cellulitis of groin  Clinical Indicators: patient with diabetes type 2 with current glucose levels 99. ..92; per Urology consultant note dated 21/72/13, \"Postop complication from partial penectomy and groin exploration performed 3 years ago which was never really healed\"  Treatment: labs, ultrasound of testicles, Urology consult, ID consult, IV ABT    Thank you,  MALU ZhangN, RN, CRCR  Clinical   [email or web address] or contact via [employer]  Options provided:  -- Cellulitis of groin associated with diabetes  -- Cellulitis of groin unrelated to diabetes  -- Other - I will add my own diagnosis  -- Disagree - Not applicable / Not valid  -- Disagree - Clinically unable to determine / Unknown  -- Refer to Clinical Documentation Reviewer    PROVIDER RESPONSE TEXT:    Cellulitis of groin associated with diabetes.     Query created by: Mario Edmond on 12/15/2022 1:14 PM      Electronically signed by:  Huang Walter NP 12/15/2022 1:59 PM

## 2022-12-15 NOTE — PROGRESS NOTES
Progress Note    Patient: Mariel Fraire. MRN: [id number]  SSN: [id number]    YOB: 1970  Age: 47 y.o. Sex: male      Admit Date: 12/14/2022    LOS: 1 day     Subjective:     47 y.o. male who has a past medical history significant for type 2 diabetes, atrial fibrillation, hypertension, DVT, and morbid obesity. He presented to the ED today with complaints of scrotal edema, with severe pain. He does have serous drainage. He was admitted on 8/18/22 with similar symptoms. He was seen by Infectious Disease at that time. He denies difficulty urinating but does report bilateral flank pain. He denies recent fevers, denies shortness of breath,chest pain, or palpitations. Denies nausea, vomiting, diarrhea, or constipation. He reports he is bed bound and lives with his family. He states he is taking 3 medications but is unsure of the names and ran out of them about 4 weeks ago. He does live with his family. Mr. Atkinson Iron admitted with cellulitis of the groin for further management and treatment. Scrotal ultrasound 12/14: shows no testicular mass or torsion, again seen is scrotal soft tissue swelling which may represent edema or cellulitis. No fluid collection or abscess is seen. He does have serous drainage noted. He was given Vancomycin 2,000 mg IV x one dose in ED. Infectious Disease consult, urology consult, and general surgery consult. Urinalysis shows trace protein, large leukocyte esterase, negative nitrites, Large amount of blood, Negative Bacteria, WBC >100. WBC elevated at 18.5, sodium 134, Creatinine 1.01 total bilirubin 1.3, ALT 21, AST 18, Sed rage 70. Blood cultures pending. Review of Systems:  Review of Systems   Constitutional:  Negative for chills and fever. Respiratory: Negative. Cardiovascular:  Negative for chest pain, palpitations and leg swelling. Gastrointestinal:  Negative for abdominal pain, constipation, diarrhea, heartburn, nausea and vomiting. Genitourinary:  Negative for dysuria and urgency. Scrotal swelling   Musculoskeletal:  Positive for back pain and myalgias. Skin: Negative. Neurological: Negative. Psychiatric/Behavioral: Negative. Objective:     Vitals:    12/15/22 0344 12/15/22 0754 12/15/22 0800 12/15/22 1136   BP: (!) 106/58 104/66  126/71   Pulse: 81 84 84 78   Resp: 18 18  20   Temp: 98.6 °F (37 °C) 97.7 °F (36.5 °C)  98.6 °F (37 °C)   SpO2:  95%  97%   Weight:       Height:            Intake and Output:  Current Shift: No intake/output data recorded. Last three shifts: No intake/output data recorded. Physical Exam  Constitutional:       Appearance: He is ill-appearing. Comments: Morbid obesity   HENT:      Head: Normocephalic and atraumatic. Cardiovascular:      Rate and Rhythm: Rhythm irregular. Heart sounds: No murmur heard. No friction rub. No gallop. Pulmonary:      Effort: Pulmonary effort is normal.      Breath sounds: No wheezing or rhonchi. Comments: Diminished bilateral lower bases  Abdominal:      General: Bowel sounds are normal.      Palpations: Abdomen is soft. Tenderness: There is no abdominal tenderness. Genitourinary:     Comments: Scrotal edema, scrotal erythema  Musculoskeletal:      Right lower leg: Edema present. Left lower leg: Edema present. Skin:     Comments: Lichenification of bilateral lower extremities worse on the Left. Neurological:      Mental Status: He is alert and oriented to person, place, and time. Psychiatric:         Mood and Affect: Mood normal.         Thought Content: Thought content normal.         Judgment: Judgment normal.       Lab/Data Review:  Recent Results (from the past 24 hour(s))   CULTURE, WOUND [place] STAIN    Collection Time: 12/14/22  6:30 PM    Specimen: Groin;  Wound   Result Value Ref Range    Special Requests: No Special Requests      GRAM STAIN 1+ WBCs seen      GRAM STAIN 3+ Gram Positive Cocci      GRAM STAIN 1+ Gram Negative Rods      Culture result: PENDING    CBC WITH AUTOMATED DIFF    Collection Time: 12/15/22  5:48 AM   Result Value Ref Range    WBC 13.8 (H) 4.1 - 11.1 K/uL    RBC 4.12 4.10 - 5.70 M/uL    HGB 11.4 (L) 12.1 - 17.0 g/dL    HCT 35.7 (L) 36.6 - 50.3 %    MCV 86.7 80.0 - 99.0 FL    MCH 27.7 26.0 - 34.0 PG    MCHC 31.9 30.0 - 36.5 g/dL    RDW 14.6 (H) 11.5 - 14.5 %    PLATELET 623 891 - 699 K/uL    MPV 9.0 8.9 - 12.9 FL    NRBC 0.0 0.0  WBC    ABSOLUTE NRBC 0.00 0.00 - 0.01 K/uL    NEUTROPHILS 82 (H) 32 - 75 %    LYMPHOCYTES 11 (L) 12 - 49 %    MONOCYTES 6 5 - 13 %    EOSINOPHILS 1 0 - 7 %    BASOPHILS 0 0 - 1 %    IMMATURE GRANULOCYTES 0 0 - 0.5 %    ABS. NEUTROPHILS 11.2 (H) 1.8 - 8.0 K/UL    ABS. LYMPHOCYTES 1.5 0.8 - 3.5 K/UL    ABS. MONOCYTES 0.8 0.0 - 1.0 K/UL    ABS. EOSINOPHILS 0.1 0.0 - 0.4 K/UL    ABS. BASOPHILS 0.1 0.0 - 0.1 K/UL    ABS. IMM. GRANS. 0.0 0.00 - 0.04 K/UL    DF AUTOMATED     METABOLIC PANEL, COMPREHENSIVE    Collection Time: 12/15/22  5:48 AM   Result Value Ref Range    Sodium 134 (L) 136 - 145 mmol/L    Potassium 3.7 3.5 - 5.1 mmol/L    Chloride 101 97 - 108 mmol/L    CO2 26 21 - 32 mmol/L    Anion gap 7 5 - 15 mmol/L    Glucose 92 65 - 100 mg/dL    BUN 12 6 - 20 mg/dL    Creatinine 0.98 0.70 - 1.30 mg/dL    BUN/Creatinine ratio 12 12 - 20      eGFR >60 >60 ml/min/1.73m2    Calcium 9.2 8.5 - 10.1 mg/dL    Bilirubin, total 1.0 0.2 - 1.0 mg/dL    AST (SGOT) 6 (L) 15 - 37 U/L    ALT (SGPT) 19 12 - 78 U/L    Alk.  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 (5) DVT: eliquis      DISPO: once cleared by ID likely tomorrow. On PO abx. Await cultures.          Signed By: Selene Lorenzo MD     December 15, 2022

## 2022-12-15 NOTE — PROGRESS NOTES
Spiritual Care Assessment/Progress Note  [facility]      NAME: Rebecca Diallo. MRN: [id number]  AGE: 47 y.o.  SEX: male  Mosque Affiliation: Other   Language: English     12/15/2022     Total Time (in minutes): 10     Spiritual Assessment begun in [address] through conversation with:         [x]Patient        [] Family    [] Friend(s)        Reason for Consult: Request by patient, Initial visit     Spiritual beliefs: (Please include comment if needed)     [x] Identifies with a abbi tradition: Jaime        [] Supported by a abbi community:            [] Claims no spiritual orientation:           [] Seeking spiritual identity:                [] Adheres to an individual form of spirituality:           [] Not able to assess:                           Identified resources for coping:      [x] Prayer                               [] Music                  [] Guided Imagery     [] Family/friends                 [] Pet visits     [] Devotional reading                         [] Unknown     [] Other:                                               Interventions offered during this visit: (See comments for more details)    Patient Interventions: Catharsis/review of pertinent events in supportive environment, Affirmation of emotions/emotional suffering, Normalization of emotional/spiritual concerns, Prayer (actual), Initial visit, Initial/Spiritual assessment, patient floor, Mosque beliefs/image of God discussed           Plan of Care:     [] Support spiritual and/or cultural needs    [] Support AMD and/or advance care planning process      [] Support grieving process   [] Coordinate Rites and/or Rituals    [] Coordination with community clergy   [] No spiritual needs identified at this time   [] Detailed Plan of Care below (See Comments)  [] Make referral to Music Therapy  [] Make referral to Pet Therapy     [] Make referral to Addiction services  [] Make referral to [facility]  [] Make referral to Spiritual Care Partner  [] No future visits requested        [x] Contact Spiritual Care for further referrals     Comments: I responded to in-basket request for spiritual care. Pt desired spiritual care, particularly prayer. In the midst of our conversation, pt explained what makes him anxious and how this admission is different from his last. I affirmed pt's honest feelings, encouraging self-expression through active listening. I provided prayer and informed pt of  availability.     Please [facility]  in order to get in touch with  for any Spiritual Care Needs   [phone]    Signed by: Chaplain Marvin

## 2022-12-16 LAB
ALBUMIN SERPL-MCNC: 2.9 G/DL (ref 3.5–5)
ALBUMIN/GLOB SERPL: 0.6 {RATIO} (ref 1.1–2.2)
ALP SERPL-CCNC: 174 U/L (ref 45–117)
ALT SERPL-CCNC: 21 U/L (ref 12–78)
ANION GAP SERPL CALC-SCNC: 5 MMOL/L (ref 5–15)
AST SERPL W P-5'-P-CCNC: 10 U/L (ref 15–37)
BASOPHILS # BLD: 0.1 K/UL (ref 0–0.1)
BASOPHILS NFR BLD: 1 % (ref 0–1)
BILIRUB SERPL-MCNC: 0.8 MG/DL (ref 0.2–1)
BUN SERPL-MCNC: 14 MG/DL (ref 6–20)
BUN/CREAT SERPL: 16 (ref 12–20)
CA-I BLD-MCNC: 9.1 MG/DL (ref 8.5–10.1)
CHLORIDE SERPL-SCNC: 101 MMOL/L (ref 97–108)
CO2 SERPL-SCNC: 29 MMOL/L (ref 21–32)
CREAT SERPL-MCNC: 0.89 MG/DL (ref 0.7–1.3)
CRP SERPL-MCNC: 11.9 MG/DL (ref 0–0.6)
DIFFERENTIAL METHOD BLD: ABNORMAL
EOSINOPHIL # BLD: 0.2 K/UL (ref 0–0.4)
EOSINOPHIL NFR BLD: 3 % (ref 0–7)
ERYTHROCYTE [DISTWIDTH] IN BLOOD BY AUTOMATED COUNT: 14.5 % (ref 11.5–14.5)
GLOBULIN SER CALC-MCNC: 5.1 G/DL (ref 2–4)
GLUCOSE SERPL-MCNC: 93 MG/DL (ref 65–100)
HCT VFR BLD AUTO: 38.6 % (ref 36.6–50.3)
HGB BLD-MCNC: 12.1 G/DL (ref 12.1–17)
IMM GRANULOCYTES # BLD AUTO: 0 K/UL (ref 0–0.04)
IMM GRANULOCYTES NFR BLD AUTO: 0 % (ref 0–0.5)
LYMPHOCYTES # BLD: 1 K/UL (ref 0.8–3.5)
LYMPHOCYTES NFR BLD: 11 % (ref 12–49)
MCH RBC QN AUTO: 27.3 PG (ref 26–34)
MCHC RBC AUTO-ENTMCNC: 31.3 G/DL (ref 30–36.5)
MCV RBC AUTO: 87.1 FL (ref 80–99)
MONOCYTES # BLD: 0.6 K/UL (ref 0–1)
MONOCYTES NFR BLD: 7 % (ref 5–13)
NEUTS SEG # BLD: 7.3 K/UL (ref 1.8–8)
NEUTS SEG NFR BLD: 78 % (ref 32–75)
NRBC # BLD: 0 K/UL (ref 0–0.01)
NRBC BLD-RTO: 0 PER 100 WBC
PLATELET # BLD AUTO: 356 K/UL (ref 150–400)
PMV BLD AUTO: 9 FL (ref 8.9–12.9)
POTASSIUM SERPL-SCNC: 3.6 MMOL/L (ref 3.5–5.1)
PROCALCITONIN SERPL-MCNC: 0.13 NG/ML
PROT SERPL-MCNC: 8 G/DL (ref 6.4–8.2)
RBC # BLD AUTO: 4.43 M/UL (ref 4.1–5.7)
SODIUM SERPL-SCNC: 135 MMOL/L (ref 136–145)
WBC # BLD AUTO: 9.3 K/UL (ref 4.1–11.1)

## 2022-12-16 PROCEDURE — 86140 C-REACTIVE PROTEIN: CPT

## 2022-12-16 PROCEDURE — 99232 SBSQ HOSP IP/OBS MODERATE 35: CPT | Performed by: INTERNAL MEDICINE

## 2022-12-16 PROCEDURE — 87086 URINE CULTURE/COLONY COUNT: CPT

## 2022-12-16 PROCEDURE — 74011250637 HC RX REV CODE- 250/637: Performed by: NURSE PRACTITIONER

## 2022-12-16 PROCEDURE — 74011000258 HC RX REV CODE- 258: Performed by: INTERNAL MEDICINE

## 2022-12-16 PROCEDURE — 84145 PROCALCITONIN (PCT): CPT

## 2022-12-16 PROCEDURE — 74011250636 HC RX REV CODE- 250/636: Performed by: INTERNAL MEDICINE

## 2022-12-16 PROCEDURE — 85025 COMPLETE CBC W/AUTO DIFF WBC: CPT

## 2022-12-16 PROCEDURE — 65270000029 HC RM PRIVATE

## 2022-12-16 PROCEDURE — 36415 COLL VENOUS BLD VENIPUNCTURE: CPT

## 2022-12-16 PROCEDURE — 80053 COMPREHEN METABOLIC PANEL: CPT

## 2022-12-16 RX ADMIN — MEROPENEM 1 G: 1 INJECTION, POWDER, FOR SOLUTION INTRAVENOUS at 08:36

## 2022-12-16 RX ADMIN — MEROPENEM 1 G: 1 INJECTION, POWDER, FOR SOLUTION INTRAVENOUS at 16:22

## 2022-12-16 RX ADMIN — RIVAROXABAN 20 MG: 20 TABLET, FILM COATED ORAL at 08:42

## 2022-12-16 RX ADMIN — METOPROLOL TARTRATE 12.5 MG: 25 TABLET, FILM COATED ORAL at 22:10

## 2022-12-16 NOTE — PROGRESS NOTES
Order received for urine culture. Pt with swelling to groin and unable to use urinal or primo fit. Notified Dr. Carlos Benito and new order for one time straight cath received. Attempted to straight cath pt however, pt complained of severe pain during procedure. Straight catheter removed and no urine was able to be collected. Pt was cleaned, given a urinal and said he would attempt to collect some urine next time he feels the urge to urinate. 1400- Pt able to void into collection cup. Specimen labeled with date, time and initials and sent to lab.

## 2022-12-16 NOTE — PROGRESS NOTES
CM reviewed chart. Patient pending culture results for outpatient antibiotic therapy. Patient accepted with [facility] for home health. Referral sent to [facility] for wheel chair. Wheelchair pending insurance auth and availability due to bariatric equipment need.

## 2022-12-16 NOTE — ROUTINE PROCESS
Bedside shift change report given to [address] [place] [address] Avenue (oncoming nurse) by Merly Go RN (offgoing nurse). Report included the following information SBAR.

## 2022-12-16 NOTE — PROGRESS NOTES
Problem: Risk for Spread of Infection  Goal: Prevent transmission of infectious organism to others  Description: Prevent the transmission of infectious organisms to other patients, staff members, and visitors. Outcome: Progressing Towards Goal     Problem: Patient Education:  Go to Education Activity  Goal: Patient/Family Education  Outcome: Progressing Towards Goal     Problem: Pressure Injury - Risk of  Goal: *Prevention of pressure injury  Description: Document Kiko Scale and appropriate interventions in the flowsheet. Outcome: Progressing Towards Goal  Note: Pressure Injury Interventions:  Sensory Interventions: Keep linens dry and wrinkle-free, Minimize linen layers    Moisture Interventions: Absorbent underpads, Minimize layers    Activity Interventions: Pressure redistribution bed/mattress(bed type)    Mobility Interventions: HOB 30 degrees or less, Float heels         Friction and Shear Interventions: Apply protective barrier, creams and emollients, Lift sheet                Problem: Patient Education: Go to Patient Education Activity  Goal: Patient/Family Education  Outcome: Progressing Towards Goal     Problem: Falls - Risk of  Goal: *Absence of Falls  Description: Document Eloisa Fall Risk and appropriate interventions in the flowsheet.   Outcome: Progressing Towards Goal  Note: Fall Risk Interventions:                 Elimination Interventions: Bed/chair exit alarm, Call light in reach, Patient to call for help with toileting needs, Toilet paper/wipes in reach, Toileting schedule/hourly rounds              Problem: Patient Education: Go to Patient Education Activity  Goal: Patient/Family Education  Outcome: Progressing Towards Goal

## 2022-12-16 NOTE — PROGRESS NOTES
Problem: Risk for Spread of Infection  Goal: Prevent transmission of infectious organism to others  Description: Prevent the transmission of infectious organisms to other patients, staff members, and visitors. Outcome: Progressing Towards Goal     Problem: Pressure Injury - Risk of  Goal: *Prevention of pressure injury  Description: Document Kiko Scale and appropriate interventions in the flowsheet. Outcome: Progressing Towards Goal  Note: Pressure Injury Interventions:  Sensory Interventions: Keep linens dry and wrinkle-free, Float heels, Maintain/enhance activity level, Minimize linen layers, Pressure redistribution bed/mattress (bed type), Turn and reposition approx. every two hours (pillows and wedges if needed), Use 30-degree side-lying position, Discuss PT/OT consult with provider    Moisture Interventions: Absorbent underpads, Apply protective barrier, creams and emollients, Assess need for specialty bed, Check for incontinence Q2 hours and as needed, Maintain skin hydration (lotion/cream), Minimize layers    Activity Interventions: Pressure redistribution bed/mattress(bed type), PT/OT evaluation, Assess need for specialty bed    Mobility Interventions: HOB 30 degrees or less, Float heels, PT/OT evaluation, Pressure redistribution bed/mattress (bed type), Turn and reposition approx. every two hours(pillow and wedges), Assess need for specialty bed         Friction and Shear Interventions: Apply protective barrier, creams and emollients, HOB 30 degrees or less, Minimize layers                Problem: Falls - Risk of  Goal: *Absence of Falls  Description: Document Eloisa Fall Risk and appropriate interventions in the flowsheet.   Outcome: Progressing Towards Goal  Note: Fall Risk Interventions:                 Elimination Interventions: Bed/chair exit alarm, Call light in reach, Patient to call for help with toileting needs, Toilet paper/wipes in reach, Toileting schedule/hourly rounds

## 2022-12-16 NOTE — WOUND CARE
IP WOUND CONSULT    Nahum Kelly MEDICAL RECORD NUMBER:  [id number]  AGE: 47 y.o. GENDER: male  : 1970  TODAY'S DATE:  2022    GENERAL     [] Follow-up   [x] New Consult    Nahum Kelly is a 47 y.o. male referred by:   [x] Physician  [] Nursing  [] Other:         PAST MEDICAL HISTORY    Past Medical History:   Diagnosis Date    Arrhythmia     afib, dx 2022    Cancer [facility])     cancer of the penis, removed about 3 years    DVT (deep venous thrombosis) (HCC)     GERD (gastroesophageal reflux disease)     Hypertension 2020    Morbid obesity ([facility] [address]         PAST SURGICAL HISTORY    Past Surgical History:   Procedure Laterality Date    HX ORTHOPAEDIC      Ankle    HX ORTHOPAEDIC      Tumors removed from left knee     HX UROLOGICAL      Penile surgery       FAMILY HISTORY    Family History   Problem Relation Age of Onset    Hypertension Mother     Lung Disease Father          ALLERGIES    No Known Allergies    MEDICATIONS    No current facility-administered medications on file prior to encounter. Current Outpatient Medications on File Prior to Encounter   Medication Sig Dispense Refill    oxyCODONE IR (ROXICODONE) 5 mg immediate release tablet Take 5 mg by mouth every four (4) hours as needed for Pain. furosemide (LASIX) 20 mg tablet Take 20 mg by mouth daily. rivaroxaban (Xarelto) 20 mg tab tablet Take 20 mg by mouth daily (with breakfast). metoprolol tartrate (LOPRESSOR) 25 mg tablet Take 25 mg by mouth once. digoxin (LANOXIN) 0.125 mg tablet Take 250 mcg by mouth daily. dilTIAZem ER (TIAZAC) 120 mg capsule Take 120 mg by mouth daily. levoFLOXacin (LEVAQUIN) 750 mg tablet Take 750 mg by mouth daily.            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  Dressing change: applied zinc paste to gluteal cleft and foam to sacrum    Contributing Factors: anticoagulation therapy, edema, diabetes, decreased mobility, shear force, incontinence of stool, incontinence of urine, and obesity    Wound Gluteal fold/cleft 12/16/22 (Active)   Wound Image   12/16/22 1048   Wound Etiology Other (Comment) 12/16/22 1048   Cleansed Other (Comment) 12/16/22 1048   Dressing/Treatment Zinc paste; Foam 12/16/22 1048   Wound Assessment Pink/red 12/16/22 1048   Drainage Amount Scant 12/16/22 1048   Drainage Description Sanguineous 12/16/22 1048   Wound Odor None 12/16/22 1048   Rebekah-Wound/Incision Assessment Blanchable erythema 12/16/22 1048   Edges Unattached edges 12/16/22 1048   Wound Thickness Description Partial thickness 12/16/22 1048   Number of days: 0          PLAN     Skin Care & Pressure Relief Recommendations  Speciality bed bariatric w/air  Minimize layers of linen  Turn/reposition approximately every 2 hours  Pillow wedges  Manage incontinence   Promote continence; Skin Protective lotion/cream to buttocks and sacrum daily and as needed with incontinence care  Offload heels pillows    Kiko 13  Blood Glucose: 92 on 12/15/22                             Albumin: 3.0 on 12/15/22  WBCs: 13.8 on 12/15/22    Support Surface: Compella Bariatric bed w/air    Physician/Provider notified:   Recommendations: fissure noted to gluteal cleft r/t MASD. Apply zinc paste TID and prn for soiling to gluteal folds / cleft and groin areas to protect skin from incontinence related breakdown. Apply Optifoam Border Sacral foam dressing every three days to sacrum to redistribute pressure from bony prominence and prevent pressure and friction injury to skin. Rn is to gently peel back foam dressing for shift integumentary assessment and re-secure if not soiled. Maintain a Quick Change Wrap to manage  incontinence. Wraps need to be checked with each hourly rounding for soiling. Use bariatric wedge to turn q2h at 30 degree angle to offload sacrum. Float heels with 1-2 pillows lengthwise while in bed for offloading of the heels. Maintain HOB at 30 degrees or less, if not contraindicated, to reduce pressure to buttocks and sacrum. Raise foot of bed to help prevent friction and shear injury from sliding down in the bed. Re-consult [facility] if skin condition changes or for skin care concerns.       Discharge Wound Care Needs: TBD    Teaching completed with:   [] Patient           [] Family member       [] Caregiver          [] Nursing  [] Other    Patient/Caregiver Teaching:  Level of patient/caregiver understanding able to:   [] Indicates understanding       [] Needs reinforcement  [] Unsuccessful      [] Verbal Understanding  [] Demonstrated understanding       [] No evidence of learning  [] Refused teaching         [] N/A       Electronically signed by Will Owen RN on 12/16/2022 at 10:54 AM

## 2022-12-16 NOTE — PROGRESS NOTES
Progress Note    Patient: Domitila Sim MRN: [id number]  SSN: [id number]    YOB: 1970  Age: 47 y.o. Sex: male      Admit Date: 12/14/2022    LOS: 2 days     Subjective:   Patient followed for sepsis with scrotal cellulitis and suspected UTI, however, a urine culture has still not been sent. Blood cultures negative so far and suprapubic wound culture pending. He is afebrile with now normal WBC and decreasing CRP. Currently on IV Meropenem. Patient resting comfortably with no complaints. Wound Care Team evaluating patient for sacral skin breakdown. Objective:     Vitals:    12/15/22 1449 12/15/22 1926 12/16/22 0258 12/16/22 0740   BP: (!) 115/58 (!) 128/53 117/61 (!) 119/59   Pulse: 60 82 68 71   Resp: 16 18  18   Temp: 98.2 °F (36.8 °C) 98 °F (36.7 °C) 98.1 °F (36.7 °C) 98 °F (36.7 °C)   SpO2: 98% 96% 98% 100%   Weight:       Height:            Intake and Output:  Current Shift: No intake/output data recorded. Last three shifts: 12/14 1901 - 12/16 0700  In: 1600 [P.O.:1350; I.V.:250]  Out: -     Physical Exam:   Vitals and nursing note reviewed. Constitutional:       Appearance: He is obese. He is ill-appearing. HENT:      Head: Normocephalic and atraumatic. Right Ear: External ear normal.      Left Ear: External ear normal.      Mouth/Throat:      Pharynx: Oropharynx is clear. Eyes:      Pupils: Pupils are equal, round, and reactive to light. Cardiovascular:      Rate and Rhythm: Normal rate and regular rhythm. Heart sounds: No murmur heard. Pulmonary:      Effort: Pulmonary effort is normal.      Breath sounds: Normal breath sounds. Abdominal:      General: Bowel sounds are normal. There is no distension. Palpations: Abdomen is soft. Tenderness: There is no abdominal tenderness. Genitourinary:     Penis: Normal.          Comments: Moderate scrotal swelling and erythema     Suprapubic ?wound  Musculoskeletal:      Cervical back: Neck supple. Right lower leg: Edema present. Left lower leg: Edema present. Skin:     Findings: No rash. Neurological:      General: No focal deficit present. Mental Status: He is oriented to person, place, and time. Psychiatric:         Mood and Affect: Mood normal.         Behavior: Behavior normal.         Thought Content: Thought content normal.         Judgment: Judgment normal.     Lab/Data Review:     WBC 9,300 <13,800     CRP 11.90 <19.00  Procal 0.13 <0.44 <0.20    Blood cultures (12/14) No growth 1 day  Blood cultures (12/15) No growth so far  Wound suprapubic culture  (12/14) Gram positive cocci and Gram negative rods    Assessment:     Active Problems:    Cellulitis of groin (12/14/2022)  Scrotal cellulitis, with moderate scrotal swelling and erythema without fluid collection, Day #2 Meropenem  ? 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 Do not anticipate need for extended IV therapy, therefore, will look for suitable oral antibiotic. It appears that CT scan pelvis was discontinued, presumably because of his weight.      Plan:   Continue IV Meropenem pending suprapubic culture results; transition to oral antibiotic reasonable based upon culture results  Follow-up blood and wound cultures  Will reorder urine culture       Signed By: Dennison Aschoff, MD     December 16, 2022

## 2022-12-17 LAB
ALBUMIN SERPL-MCNC: 2.6 G/DL (ref 3.5–5)
ALBUMIN/GLOB SERPL: 0.7 {RATIO} (ref 1.1–2.2)
ALP SERPL-CCNC: 106 U/L (ref 45–117)
ALT SERPL-CCNC: 20 U/L (ref 12–78)
ANION GAP SERPL CALC-SCNC: 5 MMOL/L (ref 5–15)
AST SERPL W P-5'-P-CCNC: 12 U/L (ref 15–37)
BASOPHILS # BLD: 0 K/UL (ref 0–0.1)
BASOPHILS NFR BLD: 1 % (ref 0–1)
BILIRUB SERPL-MCNC: 0.5 MG/DL (ref 0.2–1)
BUN SERPL-MCNC: 9 MG/DL (ref 6–20)
BUN/CREAT SERPL: 12 (ref 12–20)
CA-I BLD-MCNC: 8.3 MG/DL (ref 8.5–10.1)
CHLORIDE SERPL-SCNC: 103 MMOL/L (ref 97–108)
CO2 SERPL-SCNC: 29 MMOL/L (ref 21–32)
CREAT SERPL-MCNC: 0.73 MG/DL (ref 0.7–1.3)
DIFFERENTIAL METHOD BLD: ABNORMAL
EOSINOPHIL # BLD: 0.3 K/UL (ref 0–0.4)
EOSINOPHIL NFR BLD: 4 % (ref 0–7)
ERYTHROCYTE [DISTWIDTH] IN BLOOD BY AUTOMATED COUNT: 14.2 % (ref 11.5–14.5)
GLOBULIN SER CALC-MCNC: 3.7 G/DL (ref 2–4)
GLUCOSE SERPL-MCNC: 98 MG/DL (ref 65–100)
HCT VFR BLD AUTO: 34.6 % (ref 36.6–50.3)
HGB BLD-MCNC: 11 G/DL (ref 12.1–17)
IMM GRANULOCYTES # BLD AUTO: 0 K/UL (ref 0–0.04)
IMM GRANULOCYTES NFR BLD AUTO: 0 % (ref 0–0.5)
LYMPHOCYTES # BLD: 1.2 K/UL (ref 0.8–3.5)
LYMPHOCYTES NFR BLD: 18 % (ref 12–49)
MCH RBC QN AUTO: 27.3 PG (ref 26–34)
MCHC RBC AUTO-ENTMCNC: 31.8 G/DL (ref 30–36.5)
MCV RBC AUTO: 85.9 FL (ref 80–99)
MONOCYTES # BLD: 0.6 K/UL (ref 0–1)
MONOCYTES NFR BLD: 10 % (ref 5–13)
NEUTS SEG # BLD: 4.2 K/UL (ref 1.8–8)
NEUTS SEG NFR BLD: 67 % (ref 32–75)
NRBC # BLD: 0 K/UL (ref 0–0.01)
NRBC BLD-RTO: 0 PER 100 WBC
PLATELET # BLD AUTO: 313 K/UL (ref 150–400)
PMV BLD AUTO: 8.6 FL (ref 8.9–12.9)
POTASSIUM SERPL-SCNC: 4 MMOL/L (ref 3.5–5.1)
PROT SERPL-MCNC: 6.3 G/DL (ref 6.4–8.2)
RBC # BLD AUTO: 4.03 M/UL (ref 4.1–5.7)
SODIUM SERPL-SCNC: 137 MMOL/L (ref 136–145)
WBC # BLD AUTO: 6.4 K/UL (ref 4.1–11.1)

## 2022-12-17 PROCEDURE — 74011250637 HC RX REV CODE- 250/637: Performed by: NURSE PRACTITIONER

## 2022-12-17 PROCEDURE — 85025 COMPLETE CBC W/AUTO DIFF WBC: CPT

## 2022-12-17 PROCEDURE — 36415 COLL VENOUS BLD VENIPUNCTURE: CPT

## 2022-12-17 PROCEDURE — 80053 COMPREHEN METABOLIC PANEL: CPT

## 2022-12-17 PROCEDURE — 74011250636 HC RX REV CODE- 250/636: Performed by: INTERNAL MEDICINE

## 2022-12-17 PROCEDURE — 74011000258 HC RX REV CODE- 258: Performed by: INTERNAL MEDICINE

## 2022-12-17 PROCEDURE — 65270000029 HC RM PRIVATE

## 2022-12-17 RX ADMIN — METOPROLOL TARTRATE 12.5 MG: 25 TABLET, FILM COATED ORAL at 22:18

## 2022-12-17 RX ADMIN — MEROPENEM 1 G: 1 INJECTION, POWDER, FOR SOLUTION INTRAVENOUS at 00:01

## 2022-12-17 RX ADMIN — MEROPENEM 1 G: 1 INJECTION, POWDER, FOR SOLUTION INTRAVENOUS at 17:21

## 2022-12-17 RX ADMIN — METOPROLOL TARTRATE 12.5 MG: 25 TABLET, FILM COATED ORAL at 09:23

## 2022-12-17 RX ADMIN — MEROPENEM 1 G: 1 INJECTION, POWDER, FOR SOLUTION INTRAVENOUS at 09:23

## 2022-12-17 RX ADMIN — MEROPENEM 1 G: 1 INJECTION, POWDER, FOR SOLUTION INTRAVENOUS at 23:24

## 2022-12-17 RX ADMIN — RIVAROXABAN 20 MG: 20 TABLET, FILM COATED ORAL at 09:24

## 2022-12-17 RX ADMIN — DOCUSATE SODIUM 100 MG: 100 CAPSULE, LIQUID FILLED ORAL at 09:24

## 2022-12-17 NOTE — PROGRESS NOTES
UROLOGY Progress Note         [phone]      Daily Progress Note: 12/17/2022      Subjective: The patient is seen for UROLOGIC follow up for  groin cellulitis  The patient  came in to Ed with recurrent infection in the groin. He has a history of verruciform squamous cell carcinoma of the penis  The patient  has been followed by [address] [place] [address] urology, had not had Ct scans since the surgery. He has been having recurrent groin infection for the last 2-3 years. The concern is high risk of  recurrence. The patient was scheduled for Ct scan , but it was d/c due to patient's weight. Problem List:  Patient Active Problem List   Diagnosis Code    Hypertension I10    DVT (deep venous thrombosis) ([facility]) I82.409    Dysuria R30.0    SIRS (systemic inflammatory response syndrome) ([facility]) R65.10    Testicular swelling N50.89    Acute renal failure ([facility]) N17.9    Atrial fibrillation with rapid ventricular response ([facility]) I48.91    UTI (urinary tract infection) N39.0    Cellulitis of male genitalia N49.9    Bacteremia R78.81    Cellulitis of groin L03.314         Medications reviewed  Current Facility-Administered Medications   Medication Dose Route Frequency    polyethylene glycol (MIRALAX) packet 17 g  17 g Oral DAILY    ondansetron (ZOFRAN) injection 4 mg  4 mg IntraVENous Q6H PRN    docusate sodium (COLACE) capsule 100 mg  100 mg Oral DAILY    acetaminophen (TYLENOL) tablet 650 mg  650 mg Oral Q4H PRN    rivaroxaban (XARELTO) tablet 20 mg  20 mg Oral DAILY WITH BREAKFAST    metoprolol tartrate (LOPRESSOR) tablet 12.5 mg  12.5 mg Oral Q12H    hydrALAZINE (APRESOLINE) 20 mg/mL injection 10 mg  10 mg IntraVENous Q6H PRN    meropenem (MERREM) 1 g in 0.9% sodium chloride (MBP/ADV) 50 mL MBP  1 g IntraVENous Q8H       Review of Systems:   Review of Systems   All other systems reviewed and are negative. Objective:   Physical Exam  Constitutional:       Appearance: He is ill-appearing. HENT:      Head: Normocephalic. Nose: Nose normal.   Eyes:      Pupils: Pupils are equal, round, and reactive to light. Genitourinary:     Comments: Scrotal edema and erythema  Musculoskeletal:      Cervical back: Neck supple. Skin:     General: Skin is warm. Neurological:      General: No focal deficit present. Psychiatric:         Mood and Affect: Mood normal.        Visit Vitals  /60 (BP 1 Location: Left lower arm, BP Patient Position: At rest)   Pulse (!) 59   Temp 97.9 °F (36.6 °C)   Resp 20   Ht 6' (1.829 m)   Wt (!) 538 lb 14.4 oz (244.4 kg)   SpO2 98%   BMI 73.09 kg/m²         Data Review:       Recent Days:  Recent Labs     12/17/22  0828 12/16/22  0646 12/15/22  0548   WBC 6.4 9.3 13.8*   HGB 11.0* 12.1 11.4*   HCT 34.6* 38.6 35.7*    356 362     Recent Labs     12/17/22  0828 12/16/22  0646 12/15/22  0548    135* 134*   K 4.0 3.6 3.7    101 101   CO2 29 29 26   GLU 98 93 92   BUN 9 14 12   CREA 0.73 0.89 0.98   CA 8.3* 9.1 9.2   ALB 2.6* 2.9* 3.0*   TBILI 0.5 0.8 1.0   ALT 20 21 19       24 Hour Results:  Recent Results (from the past 24 hour(s))   CBC WITH AUTOMATED DIFF    Collection Time: 12/17/22  8:28 AM   Result Value Ref Range    WBC 6.4 4.1 - 11.1 K/uL    RBC 4.03 (L) 4.10 - 5.70 M/uL    HGB 11.0 (L) 12.1 - 17.0 g/dL    HCT 34.6 (L) 36.6 - 50.3 %    MCV 85.9 80.0 - 99.0 FL    MCH 27.3 26.0 - 34.0 PG    MCHC 31.8 30.0 - 36.5 g/dL    RDW 14.2 11.5 - 14.5 %    PLATELET 112 140 - 748 K/uL    MPV 8.6 (L) 8.9 - 12.9 FL    NRBC 0.0 0.0  WBC    ABSOLUTE NRBC 0.00 0.00 - 0.01 K/uL    NEUTROPHILS 67 32 - 75 %    LYMPHOCYTES 18 12 - 49 %    MONOCYTES 10 5 - 13 %    EOSINOPHILS 4 0 - 7 %    BASOPHILS 1 0 - 1 %    IMMATURE GRANULOCYTES 0 0 - 0.5 %    ABS. NEUTROPHILS 4.2 1.8 - 8.0 K/UL    ABS. LYMPHOCYTES 1.2 0.8 - 3.5 K/UL    ABS. MONOCYTES 0.6 0.0 - 1.0 K/UL    ABS. EOSINOPHILS 0.3 0.0 - 0.4 K/UL    ABS. BASOPHILS 0.0 0.0 - 0.1 K/UL    ABS. IMM.  GRANS. 0.0 0.00 - 0.04 K/UL    DF AUTOMATED METABOLIC PANEL, COMPREHENSIVE    Collection Time: 12/17/22  8:28 AM   Result Value Ref Range    Sodium 137 136 - 145 mmol/L    Potassium 4.0 3.5 - 5.1 mmol/L    Chloride 103 97 - 108 mmol/L    CO2 29 21 - 32 mmol/L    Anion gap 5 5 - 15 mmol/L    Glucose 98 65 - 100 mg/dL    BUN 9 6 - 20 mg/dL    Creatinine 0.73 0.70 - 1.30 mg/dL    BUN/Creatinine ratio 12 12 - 20      eGFR >60 >60 ml/min/1.73m2    Calcium 8.3 (L) 8.5 - 10.1 mg/dL    Bilirubin, total 0.5 0.2 - 1.0 mg/dL    AST (SGOT) 12 (L) 15 - 37 U/L    ALT (SGPT) 20 12 - 78 U/L    Alk. phosphatase 106 45 - 117 U/L    Protein, total 6.3 (L) 6.4 - 8.2 g/dL    Albumin 2.6 (L) 3.5 - 5.0 g/dL    Globulin 3.7 2.0 - 4.0 g/dL    A-G Ratio 0.7 (L) 1.1 - 2.2             Assessment/     Patient Active Problem List   Diagnosis Code    Hypertension I10    DVT (deep venous thrombosis) ([facility]) I82.409    Dysuria R30.0    SIRS (systemic inflammatory response syndrome) ([facility]) R65.10    Testicular swelling N50.89    Acute renal failure ([facility]) N17.9    Atrial fibrillation with rapid ventricular response ([facility]) I48.91    UTI (urinary tract infection) N39.0    Cellulitis of male genitalia N49.9    Bacteremia R78.81    Cellulitis of groin L03.314       Plan: 1. 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     Care Plan discussed with: Dr. Vale Merlin, Attending Physician      Petra Cobb, MEENA

## 2022-12-17 NOTE — PROGRESS NOTES
Progress Note    Patient: Radha Bui. MRN: [id number]  SSN: [id number]    YOB: 1970  Age: 47 y.o. Sex: male      Admit Date: 12/14/2022    LOS: 3 days     Subjective:     47 y.o. male who has a past medical history significant for type 2 diabetes, atrial fibrillation, hypertension, DVT, and morbid obesity. He presented to the ED today with complaints of scrotal edema, with severe pain. He does have serous drainage. He was admitted on 8/18/22 with similar symptoms. He was seen by Infectious Disease at that time. He denies difficulty urinating but does report bilateral flank pain. He denies recent fevers, denies shortness of breath,chest pain, or palpitations. Denies nausea, vomiting, diarrhea, or constipation. He reports he is bed bound and lives with his family. He states he is taking 3 medications but is unsure of the names and ran out of them about 4 weeks ago. He does live with his family. Mr. Ninfa Benitez admitted with cellulitis of the groin for further management and treatment. Scrotal ultrasound 12/14: shows no testicular mass or torsion, again seen is scrotal soft tissue swelling which may represent edema or cellulitis. No fluid collection or abscess is seen. He does have serous drainage noted. He was given Vancomycin 2,000 mg IV x one dose in ED. Infectious Disease consult, urology consult, and general surgery consult. Urinalysis shows trace protein, large leukocyte esterase, negative nitrites, Large amount of blood, Negative Bacteria, WBC >100. WBC elevated at 18.5, sodium 134, Creatinine 1.01 total bilirubin 1.3, ALT 21, AST 18, Sed rage 70. Blood cultures pending. Review of Systems:  Review of Systems   Constitutional:  Negative for chills and fever. Respiratory: Negative. Cardiovascular:  Negative for chest pain, palpitations and leg swelling. Gastrointestinal:  Negative for abdominal pain, constipation, diarrhea, heartburn, nausea and vomiting. Genitourinary:  Negative for dysuria and urgency. Scrotal swelling   Musculoskeletal:  Positive for back pain and myalgias. Skin: Negative. Neurological: Negative. Psychiatric/Behavioral: Negative. Objective:     Vitals:    12/16/22 1420 12/16/22 2019 12/17/22 0139 12/17/22 0846   BP: 116/60 132/68 128/72 122/60   Pulse: 72 71 64 (!) 59   Resp: 18 20 20 20   Temp: 98.5 °F (36.9 °C) 97.8 °F (36.6 °C) 97.7 °F (36.5 °C) 97.9 °F (36.6 °C)   SpO2: 99% 98% 98% 98%   Weight:       Height:            Intake and Output:  Current Shift: No intake/output data recorded. Last three shifts: 12/15 1901 - 12/17 0700  In: 1100 [P.O.:900; I.V.:200]  Out: -       Physical Exam  Constitutional:       Appearance: He is ill-appearing. Comments: Morbid obesity   HENT:      Head: Normocephalic and atraumatic. Cardiovascular:      Rate and Rhythm: Rhythm irregular. Heart sounds: No murmur heard. No friction rub. No gallop. Pulmonary:      Effort: Pulmonary effort is normal.      Breath sounds: No wheezing or rhonchi. Comments: Diminished bilateral lower bases  Abdominal:      General: Bowel sounds are normal.      Palpations: Abdomen is soft. Tenderness: There is no abdominal tenderness. Genitourinary:     Comments: Scrotal edema, scrotal erythema  Musculoskeletal:      Right lower leg: Edema present. Left lower leg: Edema present. Skin:     Comments: Lichenification of bilateral lower extremities worse on the Left. Neurological:      Mental Status: He is alert and oriented to person, place, and time. Psychiatric:         Mood and Affect: Mood normal.         Thought Content:  Thought content normal.         Judgment: Judgment normal.       Lab/Data Review:  Recent Results (from the past 24 hour(s))   CBC WITH AUTOMATED DIFF    Collection Time: 12/17/22  8:28 AM   Result Value Ref Range    WBC 6.4 4.1 - 11.1 K/uL    RBC 4.03 (L) 4.10 - 5.70 M/uL    HGB 11.0 (L) 12.1 - 17.0 g/dL HCT 34.6 (L) 36.6 - 50.3 %    MCV 85.9 80.0 - 99.0 FL    MCH 27.3 26.0 - 34.0 PG    MCHC 31.8 30.0 - 36.5 g/dL    RDW 14.2 11.5 - 14.5 %    PLATELET 303 520 - 672 K/uL    MPV 8.6 (L) 8.9 - 12.9 FL    NRBC 0.0 0.0  WBC    ABSOLUTE NRBC 0.00 0.00 - 0.01 K/uL    NEUTROPHILS 67 32 - 75 %    LYMPHOCYTES 18 12 - 49 %    MONOCYTES 10 5 - 13 %    EOSINOPHILS 4 0 - 7 %    BASOPHILS 1 0 - 1 %    IMMATURE GRANULOCYTES 0 0 - 0.5 %    ABS. NEUTROPHILS 4.2 1.8 - 8.0 K/UL    ABS. LYMPHOCYTES 1.2 0.8 - 3.5 K/UL    ABS. MONOCYTES 0.6 0.0 - 1.0 K/UL    ABS. EOSINOPHILS 0.3 0.0 - 0.4 K/UL    ABS. BASOPHILS 0.0 0.0 - 0.1 K/UL    ABS. IMM. GRANS. 0.0 0.00 - 0.04 K/UL    DF AUTOMATED     METABOLIC PANEL, COMPREHENSIVE    Collection Time: 12/17/22  8:28 AM   Result Value Ref Range    Sodium 137 136 - 145 mmol/L    Potassium 4.0 3.5 - 5.1 mmol/L    Chloride 103 97 - 108 mmol/L    CO2 29 21 - 32 mmol/L    Anion gap 5 5 - 15 mmol/L    Glucose 98 65 - 100 mg/dL    BUN 9 6 - 20 mg/dL    Creatinine 0.73 0.70 - 1.30 mg/dL    BUN/Creatinine ratio 12 12 - 20      eGFR >60 >60 ml/min/1.73m2    Calcium 8.3 (L) 8.5 - 10.1 mg/dL    Bilirubin, total 0.5 0.2 - 1.0 mg/dL    AST (SGOT) 12 (L) 15 - 37 U/L    ALT (SGPT) 20 12 - 78 U/L    Alk. 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 (5) DVT: eliquis      DISPO: waiting on urine cultures. No growth day 1.          Signed By: Kim Benson MD     December 17, 2022

## 2022-12-17 NOTE — PROGRESS NOTES
Bedside, Verbal, and Written shift change report given to Linden JOSE RN (oncoming nurse) by Marjan Polo RN (offgoing nurse). Report included the following information SBAR.

## 2022-12-18 LAB
BACTERIA SPEC CULT: NORMAL
BACTERIA SPEC CULT: NORMAL
GRAM STN SPEC: NORMAL
SPECIAL REQUESTS,SREQ: NORMAL
SPECIAL REQUESTS,SREQ: NORMAL

## 2022-12-18 PROCEDURE — 99231 SBSQ HOSP IP/OBS SF/LOW 25: CPT | Performed by: INTERNAL MEDICINE

## 2022-12-18 PROCEDURE — 74011250636 HC RX REV CODE- 250/636: Performed by: INTERNAL MEDICINE

## 2022-12-18 PROCEDURE — 65270000029 HC RM PRIVATE

## 2022-12-18 PROCEDURE — 74011000258 HC RX REV CODE- 258: Performed by: INTERNAL MEDICINE

## 2022-12-18 PROCEDURE — 74011250637 HC RX REV CODE- 250/637: Performed by: NURSE PRACTITIONER

## 2022-12-18 RX ORDER — LEVOFLOXACIN 5 MG/ML
750 INJECTION, SOLUTION INTRAVENOUS EVERY 24 HOURS
Status: DISCONTINUED | OUTPATIENT
Start: 2022-12-18 | End: 2022-12-19 | Stop reason: HOSPADM

## 2022-12-18 RX ADMIN — MEROPENEM 1 G: 1 INJECTION, POWDER, FOR SOLUTION INTRAVENOUS at 15:23

## 2022-12-18 RX ADMIN — MEROPENEM 1 G: 1 INJECTION, POWDER, FOR SOLUTION INTRAVENOUS at 09:06

## 2022-12-18 RX ADMIN — RIVAROXABAN 20 MG: 20 TABLET, FILM COATED ORAL at 09:06

## 2022-12-18 RX ADMIN — METOPROLOL TARTRATE 12.5 MG: 25 TABLET, FILM COATED ORAL at 09:06

## 2022-12-18 RX ADMIN — MEROPENEM 1 G: 1 INJECTION, POWDER, FOR SOLUTION INTRAVENOUS at 23:30

## 2022-12-18 RX ADMIN — LEVOFLOXACIN 750 MG: 5 INJECTION, SOLUTION INTRAVENOUS at 21:29

## 2022-12-18 NOTE — PROGRESS NOTES
Patient assessment completed for this shift. Bed in lowest position. Call bell within reach. Patient voiced  understanding to call for assistance as needed. No c/o pain voiced by patient, at this time. No falls reported at this time, patient bedbound. Patient remains stable. Patient incontinent of bowel and bladder. Problem: Risk for Spread of Infection  Goal: Prevent transmission of infectious organism to others  Description: Prevent the transmission of infectious organisms to other patients, staff members, and visitors. Outcome: Progressing Towards Goal     Problem: Pressure Injury - Risk of  Goal: *Prevention of pressure injury  Description: Document Kiko Scale and appropriate interventions in the flowsheet. Outcome: Progressing Towards Goal  Note: Pressure Injury Interventions:  Sensory Interventions: Minimize linen layers    Moisture Interventions: Absorbent underpads    Activity Interventions: PT/OT evaluation    Mobility Interventions: HOB 30 degrees or less    Nutrition Interventions: Document food/fluid/supplement intake    Friction and Shear Interventions: Apply protective barrier, creams and emollients                Problem: Falls - Risk of  Goal: *Absence of Falls  Description: Document Eloisa Fall Risk and appropriate interventions in the flowsheet. Outcome: Progressing Towards Goal  Note: Fall Risk Interventions:            Medication Interventions: Assess postural VS orthostatic hypotension, Patient to call before getting OOB    Elimination Interventions: Call light in reach, Patient to call for help with toileting needs             Progressing in plan of care. Will continue to monitor during this admission.

## 2022-12-19 VITALS
DIASTOLIC BLOOD PRESSURE: 71 MMHG | RESPIRATION RATE: 18 BRPM | TEMPERATURE: 97.2 F | HEIGHT: 72 IN | HEART RATE: 66 BPM | OXYGEN SATURATION: 98 % | BODY MASS INDEX: 42.66 KG/M2 | SYSTOLIC BLOOD PRESSURE: 141 MMHG | WEIGHT: 315 LBS

## 2022-12-19 LAB
BASOPHILS # BLD: 0.1 K/UL (ref 0–0.1)
BASOPHILS NFR BLD: 1 % (ref 0–1)
CRP SERPL-MCNC: 2.21 MG/DL (ref 0–0.6)
DIFFERENTIAL METHOD BLD: ABNORMAL
EOSINOPHIL # BLD: 0.3 K/UL (ref 0–0.4)
EOSINOPHIL NFR BLD: 4 % (ref 0–7)
ERYTHROCYTE [DISTWIDTH] IN BLOOD BY AUTOMATED COUNT: 14.2 % (ref 11.5–14.5)
HCT VFR BLD AUTO: 37.4 % (ref 36.6–50.3)
HGB BLD-MCNC: 11.8 G/DL (ref 12.1–17)
IMM GRANULOCYTES # BLD AUTO: 0 K/UL (ref 0–0.04)
IMM GRANULOCYTES NFR BLD AUTO: 0 % (ref 0–0.5)
LYMPHOCYTES # BLD: 1.1 K/UL (ref 0.8–3.5)
LYMPHOCYTES NFR BLD: 18 % (ref 12–49)
MCH RBC QN AUTO: 27.4 PG (ref 26–34)
MCHC RBC AUTO-ENTMCNC: 31.6 G/DL (ref 30–36.5)
MCV RBC AUTO: 87 FL (ref 80–99)
MONOCYTES # BLD: 0.4 K/UL (ref 0–1)
MONOCYTES NFR BLD: 6 % (ref 5–13)
NEUTS SEG # BLD: 4.5 K/UL (ref 1.8–8)
NEUTS SEG NFR BLD: 71 % (ref 32–75)
NRBC # BLD: 0 K/UL (ref 0–0.01)
NRBC BLD-RTO: 0 PER 100 WBC
PLATELET # BLD AUTO: 347 K/UL (ref 150–400)
PMV BLD AUTO: 8.5 FL (ref 8.9–12.9)
PROCALCITONIN SERPL-MCNC: <0.05 NG/ML
RBC # BLD AUTO: 4.3 M/UL (ref 4.1–5.7)
WBC # BLD AUTO: 6.4 K/UL (ref 4.1–11.1)

## 2022-12-19 PROCEDURE — 74011250636 HC RX REV CODE- 250/636: Performed by: INTERNAL MEDICINE

## 2022-12-19 PROCEDURE — 74011250637 HC RX REV CODE- 250/637: Performed by: NURSE PRACTITIONER

## 2022-12-19 PROCEDURE — 84145 PROCALCITONIN (PCT): CPT

## 2022-12-19 PROCEDURE — 86140 C-REACTIVE PROTEIN: CPT

## 2022-12-19 PROCEDURE — 36415 COLL VENOUS BLD VENIPUNCTURE: CPT

## 2022-12-19 PROCEDURE — 85025 COMPLETE CBC W/AUTO DIFF WBC: CPT

## 2022-12-19 PROCEDURE — 74011000258 HC RX REV CODE- 258: Performed by: INTERNAL MEDICINE

## 2022-12-19 PROCEDURE — 99232 SBSQ HOSP IP/OBS MODERATE 35: CPT | Performed by: INTERNAL MEDICINE

## 2022-12-19 RX ORDER — DIGOXIN 125 MCG
250 TABLET ORAL DAILY
Qty: 30 TABLET | Refills: 0 | Status: SHIPPED | OUTPATIENT
Start: 2022-12-19

## 2022-12-19 RX ORDER — LEVOFLOXACIN 750 MG/1
750 TABLET ORAL DAILY
Qty: 14 TABLET | Refills: 0 | Status: SHIPPED | OUTPATIENT
Start: 2022-12-19

## 2022-12-19 RX ORDER — LANOLIN ALCOHOL/MO/W.PET/CERES
1.5 CREAM (GRAM) TOPICAL
Status: DISCONTINUED | OUTPATIENT
Start: 2022-12-19 | End: 2022-12-19 | Stop reason: HOSPADM

## 2022-12-19 RX ORDER — TRAMADOL HYDROCHLORIDE 50 MG/1
50 TABLET ORAL
Status: DISCONTINUED | OUTPATIENT
Start: 2022-12-19 | End: 2022-12-19 | Stop reason: HOSPADM

## 2022-12-19 RX ORDER — DILTIAZEM HYDROCHLORIDE 120 MG/1
120 CAPSULE, EXTENDED RELEASE ORAL DAILY
Qty: 30 CAPSULE | Refills: 0 | Status: SHIPPED | OUTPATIENT
Start: 2022-12-19

## 2022-12-19 RX ORDER — FUROSEMIDE 20 MG/1
20 TABLET ORAL DAILY
Qty: 30 TABLET | Refills: 0 | Status: SHIPPED | OUTPATIENT
Start: 2022-12-19

## 2022-12-19 RX ORDER — METOPROLOL TARTRATE 25 MG/1
12.5 TABLET, FILM COATED ORAL EVERY 12 HOURS
Qty: 60 TABLET | Refills: 0 | Status: SHIPPED | OUTPATIENT
Start: 2022-12-19

## 2022-12-19 RX ADMIN — METOPROLOL TARTRATE 12.5 MG: 25 TABLET, FILM COATED ORAL at 08:15

## 2022-12-19 RX ADMIN — RIVAROXABAN 20 MG: 20 TABLET, FILM COATED ORAL at 08:16

## 2022-12-19 RX ADMIN — MEROPENEM 1 G: 1 INJECTION, POWDER, FOR SOLUTION INTRAVENOUS at 08:15

## 2022-12-19 NOTE — PROGRESS NOTES
Patient assessment completed for this shift. Bed in lowest position. Call bell within reach. Patient voiced  understanding to call for assistance as needed. No c/o pain voiced by patient, at this time. No falls reported at this time. Patient remains stable. Incontinence care completed and patient encourage to shift body position more frequently in bed. Progressing in plan of care. Will continue to monitor during this admission. Problem: Risk for Spread of Infection  Goal: Prevent transmission of infectious organism to others  Description: Prevent the transmission of infectious organisms to other patients, staff members, and visitors. Outcome: Progressing Towards Goal     Problem: Pressure Injury - Risk of  Goal: *Prevention of pressure injury  Description: Document Kiko Scale and appropriate interventions in the flowsheet. Outcome: Progressing Towards Goal  Note: Pressure Injury Interventions:  Sensory Interventions: Pressure redistribution bed/mattress (bed type), Minimize linen layers, Keep linens dry and wrinkle-free    Moisture Interventions: Absorbent underpads, Minimize layers    Activity Interventions: Pressure redistribution bed/mattress(bed type)    Mobility Interventions: Pressure redistribution bed/mattress (bed type)    Nutrition Interventions: Document food/fluid/supplement intake    Friction and Shear Interventions: Apply protective barrier, creams and emollients, Minimize layers                Problem: Falls - Risk of  Goal: *Absence of Falls  Description: Document Eloisa Fall Risk and appropriate interventions in the flowsheet.   Outcome: Progressing Towards Goal  Note: Fall Risk Interventions:            Medication Interventions: Evaluate medications/consider consulting pharmacy    Elimination Interventions: Call light in reach

## 2022-12-19 NOTE — DISCHARGE SUMMARY
Admit date: 12/14/2022   Admitting Provider: Lucy Moreno MD    Discharge date: 12/19/2022  Discharging Provider: Corrinne Fells, PA      * Admission Diagnoses: Cellulitis of groin [I33.818]    * Discharge Diagnoses:    Hospital Problems as of 12/19/2022 Date Reviewed: 8/18/2022            Codes Class Noted - Resolved POA    Cellulitis of groin ICD-10-CM: L03.314  ICD-9-CM: 682.2  12/14/2022 - Present Unknown        UTI (urinary tract infection) ICD-10-CM: N39.0  ICD-9-CM: 599.0  8/25/2022 - Present Yes           * Hospital Course:     Yokasta Carbone is a 36-year-old male with a PMH of diabetes, atrial fibrillation, hypertension, DVT and morbid obesity who presented with scrotal edema with severe pain. In ED vitals normal. Initial labs significant for WBC of 18.5 and procalcitonin of 0.2. UA consistent with UTI. Scrotal ultrasound with no testicular mass or torsion. Patient admitted for further work-up. Patient started on meropenem. Urology and ID consulted. Blood culture negative. Unable to obtain CT scan 2/2 body habitus. Urine culture negative but is probably false as meropenem was started before obtaining this. Suprapubic culture  grew proteus mirabilis and enterobacter aerogenes. Patient to transition to Levaquin for the next 14 days. Discussed with the patient an importance of following up with [facility] urology and obtaining CT scan after the discharge due to risk of recurrence. Patient to follow-up with ID and urology within 2 weeks of discharge. All questions answered at time of encounter.     * Procedures:   * No surgery found *      Consults:   ID and urology    Significant Diagnostic Studies: As discussed in hospital course    Discharge Exam:  Visit Vitals  BP (!) 141/71 (BP 1 Location: Right lower arm, BP Patient Position: At rest)   Pulse 66   Temp 97.2 °F (36.2 °C)   Resp 18   Ht 6' (1.829 m)   Wt (!) 244.4 kg (538 lb 14.4 oz)   SpO2 98%   BMI 73.09 kg/m²       PHYSICAL EXAM:  Constitutional: Alert in no acute distress. Morbidly obese but pleasant. HEENT: Sclerae anicteric, The neck is supple. Cardiovascular: Regular rate and irregular rhythm. No murmurs, gallops, or rubs. Respiratory: Clear breath sounds with no wheezes, rales, or rhonchi. GI: Abdomen nondistended, soft, and nontender. Normal active bowel sounds. Rectal: Deferred   Musculoskeletal: No pitting edema of the lower legs. Extremities have good range of motion. Neurological:  Patient is alert and oriented. Cranial nerves II-XII intact  Psychiatric: Mood appears appropriate with judgement intact. Lymphatic: No cervical or supraclavicular adenopathy. Skin: Lichenification of bilateral lower extremities worse on the Left. * Discharge Condition: improved  * Disposition: Home    Discharge Medications:  Current Discharge Medication List        CONTINUE these medications which have CHANGED    Details   metoprolol tartrate (LOPRESSOR) 25 mg tablet Take 0.5 Tablets by mouth every twelve (12) hours. Qty: 60 Tablet, Refills: 0  Start date: 12/19/2022      levoFLOXacin (Levaquin) 750 mg tablet Take 1 Tablet by mouth daily. Qty: 14 Tablet, Refills: 0  Start date: 12/19/2022           CONTINUE these medications which have NOT CHANGED    Details   furosemide (LASIX) 20 mg tablet Take 20 mg by mouth daily. rivaroxaban (Xarelto) 20 mg tab tablet Take 20 mg by mouth daily (with breakfast). digoxin (LANOXIN) 0.125 mg tablet Take 250 mcg by mouth daily. dilTIAZem ER (TIAZAC) 120 mg capsule Take 120 mg by mouth daily. STOP taking these medications       oxyCODONE IR (ROXICODONE) 5 mg immediate release tablet Comments:   Reason for Stopping:               * Follow-up Care/Patient Instructions:   Activity: Activity as tolerated  Diet: Cardiac Diet  Wound Care: None needed    Follow-up Information       Follow up With Specialties Details Why Contact Info    None    None [phone] Patient stated that they have no PCP Ofelia Haines MD Infectious Disease Physician Schedule an appointment as soon as possible for a visit in 2 week(s) Post-hospitalization follow-up [address] [facility] [address]      [place] an appointment as soon as possible for a visit in 2 week(s) Post-hospitalization follow-up [address]  [phone]              Discharge summary greater than 35 minutes spent with the patient performing discharge instructions, medication review and physical exam    Signed:  FATIMAH Ronquillo  12/19/2022  9:47 AM

## 2022-12-19 NOTE — PROGRESS NOTES
Patient discharging home today, orders for self care. Tried to setup home health, was accepted with [facility]. no PCP. Informed yuliya hernandez, waiting to see if they can help with PCP issue. If not, home self care. Patient is morbid obese and unable to ambulate due to it, is bed bound. Ramp to house and requires ambulance transport. Transport being setup. Discharge plan of care/case management plan validated with provider discharge order. Medicare pt has received, reviewed, and signed 2nd IM letter informing them of their right to appeal the discharge. Signed copied has been placed on pt bedside chart.

## 2022-12-19 NOTE — PROGRESS NOTES
Suprapubic culture grew Proteus mirabilis and Enterobacter aerogenes while  urine culture is negative, probably false negative since patient was on Meropenem at the time of collection. With negative blood cultures, it is reasonable to switch to IV Levaquin with discharge on oral Levaquin in next 24 - 48 hours for 14 days.

## 2022-12-20 LAB
BACTERIA SPEC CULT: NORMAL
SPECIAL REQUESTS,SREQ: NORMAL

## 2022-12-20 NOTE — PROGRESS NOTES
Progress Note    Patient: Fouzia Walters. MRN: [id number]  SSN: [id number]    YOB: 1970  Age: 47 y.o. Sex: male      Admit Date: 12/14/2022    LOS: 5 days     Subjective:   Patient followed for sepsis with scrotal cellulitis and suspected UTI, however, a urine culture has still not been sent. Blood cultures negative so far and suprapubic wound culture pending. He is afebrile with now normal WBC and decreasing CRP. Currently on IV Meropenem. Patient resting comfortably with no complaints. Wound Care Team evaluating patient for sacral skin breakdown. Objective:     Vitals:    12/18/22 1530 12/18/22 1947 12/19/22 0241 12/19/22 0911   BP: 107/60 (!) 101/58 125/68 (!) 141/71   Pulse: 62 (!) 59 65 66   Resp: 20 20 20 18   Temp: 97.4 °F (36.3 °C) 97.7 °F (36.5 °C) 97.6 °F (36.4 °C) 97.2 °F (36.2 °C)   SpO2: 98% 98% 99% 98%   Weight:       Height:            Intake and Output:  Current Shift: No intake/output data recorded. Last three shifts: 12/18 0701 - 12/19 1900  In: 250 [I.V.:250]  Out: 1     Physical Exam:   Vitals and nursing note reviewed. Patient unavailable for re-examination  Constitutional:       Appearance: He is obese. He is ill-appearing. HENT:      Head: Normocephalic and atraumatic. Right Ear: External ear normal.      Left Ear: External ear normal.      Mouth/Throat:      Pharynx: Oropharynx is clear. Eyes:      Pupils: Pupils are equal, round, and reactive to light. Cardiovascular:      Rate and Rhythm: Normal rate and regular rhythm. Heart sounds: No murmur heard. Pulmonary:      Effort: Pulmonary effort is normal.      Breath sounds: Normal breath sounds. Abdominal:      General: Bowel sounds are normal. There is no distension. Palpations: Abdomen is soft. Tenderness: There is no abdominal tenderness.    Genitourinary:     Penis: Normal.          Comments: Moderate scrotal swelling and erythema     Suprapubic ?wound  Musculoskeletal: Cervical back: Neck supple. Right lower leg: Edema present. Left lower leg: Edema present. Skin:     Findings: No rash. Neurological:      General: No focal deficit present. Mental Status: He is oriented to person, place, and time. Psychiatric:         Mood and Affect: Mood normal.         Behavior: Behavior normal.         Thought Content: Thought content normal.         Judgment: Judgment normal.     Lab/Data Review:     WBC 9,300 <13,800     CRP 11.90 <19.00  Procal 0.13 <0.44 <0.20    Blood cultures (12/14) No growth 5 days  Blood cultures (12/15) No growth 4 days  Urine culture (12/15) No growth FINAL  Wound suprapubic culture  (12/14) Moderate mixed skin rebekah FINAL    Assessment:     Active Problems:    UTI (urinary tract infection) (8/25/2022)      Cellulitis of groin (12/14/2022)  Scrotal cellulitis, with moderate scrotal swelling and erythema without fluid collection, Day #2 Meropenem  ? 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       Plan:   Reasonable to discharge on oral Levaquin given lack of useful culture data  Follow-up blood cultures until finalized       Signed By: Hardeep Shen MD     December 20, 2022

## 2022-12-21 LAB
BACTERIA SPEC CULT: NORMAL
SPECIAL REQUESTS,SREQ: NORMAL

## 2023-01-18 PROBLEM — N39.0 UTI (URINARY TRACT INFECTION): Status: RESOLVED | Noted: 2022-08-25 | Resolved: 2023-01-18

## 2024-04-14 ENCOUNTER — HOSPITAL ENCOUNTER (EMERGENCY)
Facility: HOSPITAL | Age: 54
Discharge: HOME | End: 2024-04-15
Attending: EMERGENCY MEDICINE
Payer: MEDICARE

## 2024-04-14 DIAGNOSIS — R33.8 ACUTE URINARY RETENTION: Primary | ICD-10-CM

## 2024-04-14 PROCEDURE — 99283 EMERGENCY DEPT VISIT LOW MDM: CPT

## 2024-04-14 RX ORDER — CEPHALEXIN 500 MG/1
500 CAPSULE ORAL 4 TIMES DAILY
Qty: 28 CAPSULE | Refills: 0 | Status: SHIPPED | OUTPATIENT
Start: 2024-04-14 | End: 2024-04-21

## 2024-04-14 RX ORDER — SULFAMETHOXAZOLE AND TRIMETHOPRIM 800; 160 MG/1; MG/1
2 TABLET ORAL 2 TIMES DAILY
Qty: 28 TABLET | Refills: 0 | Status: SHIPPED | OUTPATIENT
Start: 2024-04-14 | End: 2024-04-21

## 2024-04-14 ASSESSMENT — LIFESTYLE VARIABLES
HOW OFTEN DO YOU HAVE A DRINK CONTAINING ALCOHOL: NEVER
HOW MANY STANDARD DRINKS CONTAINING ALCOHOL DO YOU HAVE ON A TYPICAL DAY: PATIENT DOES NOT DRINK

## 2024-04-14 ASSESSMENT — PAIN - FUNCTIONAL ASSESSMENT: PAIN_FUNCTIONAL_ASSESSMENT: NONE - DENIES PAIN

## 2024-04-14 NOTE — ED PROVIDER NOTES
[facility] EMERGENCY DEPT  EMERGENCY DEPARTMENT HISTORY AND PHYSICAL EXAM      Date: 4/14/2024  Patient Name: Faisal Baird Jr.  MRN: [id number]  Birthdate 1970  Date of evaluation: 4/14/2024  Provider: Ramses Mcpherson MD   Note Started: 4:23 PM EDT 4/14/24    HISTORY OF PRESENT ILLNESS     Chief Complaint   Patient presents with    Urinary Retention       History Provided By: Patient and EMS    HPI: Faisal Baird Jr. is a 53 y.o. male sent here for evaluation of urinary retention.  Patient states his last normal urine output was yesterday.  Patient reports history of the same, has been admitted in the past due to infection.  Patient denies fevers or chills, does report some suprapubic abdominal pain.    PAST MEDICAL HISTORY   Past Medical History:  Past Medical History:   Diagnosis Date    Arrhythmia     afib, dx 8/2022    Cancer (HCC)     cancer of the penis, removed about 3 years    DVT (deep venous thrombosis) (HCC)     GERD (gastroesophageal reflux disease)     Hypertension 07/27/2020    Morbid obesity (HCC)        Past Surgical History:  Past Surgical History:   Procedure Laterality Date    ORTHOPEDIC SURGERY      Ankle    ORTHOPEDIC SURGERY      Tumors removed from left knee     UROLOGICAL SURGERY      Penile surgery       Family History:  Family History   Problem Relation Age of Onset    Lung Disease Father     Hypertension Mother        Social History:  Social History     Tobacco Use    Smoking status: Never    Smokeless tobacco: Never   Substance Use Topics    Alcohol use: Not Currently    Drug use: Never       Allergies:  No Known Allergies    PCP: Delonte Rowe DO    Current Meds:   No current facility-administered medications for this encounter.     Current Outpatient Medications   Medication Sig Dispense Refill    sulfamethoxazole-trimethoprim (BACTRIM DS) 800-160 MG per tablet Take 2 tablets by mouth 2 times daily for 7 days 28 tablet 0    cephALEXin (KEFLEX) 500 MG capsule Take 1 capsule

## 2024-04-15 VITALS
HEART RATE: 103 BPM | OXYGEN SATURATION: 98 % | BODY MASS INDEX: 46.65 KG/M2 | WEIGHT: 315 LBS | SYSTOLIC BLOOD PRESSURE: 136 MMHG | TEMPERATURE: 97.7 F | RESPIRATION RATE: 20 BRPM | DIASTOLIC BLOOD PRESSURE: 67 MMHG | HEIGHT: 69 IN

## 2024-04-15 ASSESSMENT — PAIN - FUNCTIONAL ASSESSMENT: PAIN_FUNCTIONAL_ASSESSMENT: NONE - DENIES PAIN

## 2024-04-15 NOTE — ED NOTES
Spoke with NS and she advised that the AO approved the hospital to cover the transportation back to his residence.  [facility] is unable to provide a truck until after 7A, H2H ETA 0030

## 2024-04-15 NOTE — ED NOTES
Pt does not have a copy of his insurance card at this time. State that the card is in the wife's wallet that is in a truck and shop. I have called all numbers listed for medicare insurances and was unable to track down pt's ability to have transportation services. Spoke with wife. She is going to go to the shop where the truck is and call us with information on it.

## 2025-02-16 ENCOUNTER — APPOINTMENT (OUTPATIENT)
Facility: HOSPITAL | Age: 55
DRG: 871 | End: 2025-02-16
Payer: MEDICARE

## 2025-02-16 ENCOUNTER — HOSPITAL ENCOUNTER (INPATIENT)
Facility: HOSPITAL | Age: 55
LOS: 4 days | Discharge: HOME HEALTH | DRG: 871 | End: 2025-02-20
Attending: STUDENT IN AN ORGANIZED HEALTH CARE EDUCATION/TRAINING PROGRAM | Admitting: FAMILY MEDICINE
Payer: MEDICARE

## 2025-02-16 DIAGNOSIS — J10.1 INFLUENZA A: Primary | ICD-10-CM

## 2025-02-16 DIAGNOSIS — R79.89 ELEVATED D-DIMER: ICD-10-CM

## 2025-02-16 DIAGNOSIS — J81.0 ACUTE PULMONARY EDEMA (HCC): ICD-10-CM

## 2025-02-16 DIAGNOSIS — J96.01 ACUTE HYPOXIC RESPIRATORY FAILURE (HCC): ICD-10-CM

## 2025-02-16 LAB
ALBUMIN SERPL-MCNC: 2.4 G/DL (ref 3.5–5)
ALBUMIN/GLOB SERPL: 0.5 (ref 1.1–2.2)
ALP SERPL-CCNC: 150 U/L (ref 45–117)
ALT SERPL-CCNC: 31 U/L (ref 12–78)
ANION GAP SERPL CALC-SCNC: 12 MMOL/L (ref 2–12)
AST SERPL W P-5'-P-CCNC: 43 U/L (ref 15–37)
BASOPHILS # BLD: 0.08 K/UL (ref 0–0.1)
BASOPHILS NFR BLD: 0.3 % (ref 0–1)
BILIRUB SERPL-MCNC: 3.2 MG/DL (ref 0.2–1)
BNP SERPL-MCNC: 1577 PG/ML
BUN SERPL-MCNC: 9 MG/DL (ref 6–20)
BUN/CREAT SERPL: 4 (ref 12–20)
CA-I BLD-MCNC: 8.1 MG/DL (ref 8.5–10.1)
CHLORIDE SERPL-SCNC: 101 MMOL/L (ref 97–108)
CHOLEST SERPL-MCNC: 68 MG/DL
CO2 SERPL-SCNC: 24 MMOL/L (ref 21–32)
CREAT SERPL-MCNC: 2.06 MG/DL (ref 0.7–1.3)
D DIMER PPP FEU-MCNC: 1.19 UG/ML(FEU)
DIFFERENTIAL METHOD BLD: ABNORMAL
EOSINOPHIL # BLD: 0.18 K/UL (ref 0–0.4)
EOSINOPHIL NFR BLD: 0.7 % (ref 0–7)
ERYTHROCYTE [DISTWIDTH] IN BLOOD BY AUTOMATED COUNT: 14.2 % (ref 11.5–14.5)
EST. AVERAGE GLUCOSE BLD GHB EST-MCNC: 117 MG/DL
FLUAV RNA SPEC QL NAA+PROBE: DETECTED
FLUBV RNA SPEC QL NAA+PROBE: NOT DETECTED
GLOBULIN SER CALC-MCNC: 4.7 G/DL (ref 2–4)
GLUCOSE BLD STRIP.AUTO-MCNC: 118 MG/DL (ref 65–100)
GLUCOSE BLD STRIP.AUTO-MCNC: 135 MG/DL (ref 65–100)
GLUCOSE BLD STRIP.AUTO-MCNC: 173 MG/DL (ref 65–100)
GLUCOSE BLD STRIP.AUTO-MCNC: 198 MG/DL (ref 65–100)
GLUCOSE SERPL-MCNC: 181 MG/DL (ref 65–100)
HBA1C MFR BLD: 5.7 % (ref 4–5.6)
HCT VFR BLD AUTO: 39.3 % (ref 36.6–50.3)
HDLC SERPL-MCNC: 30 MG/DL
HDLC SERPL: 2.3 (ref 0–5)
HGB BLD-MCNC: 13 G/DL (ref 12.1–17)
IMM GRANULOCYTES # BLD AUTO: 0.28 K/UL (ref 0–0.04)
IMM GRANULOCYTES NFR BLD AUTO: 1.1 % (ref 0–0.5)
LACTATE BLD-SCNC: 6.84 MMOL/L (ref 0.4–2)
LACTATE BLD-SCNC: 6.99 MMOL/L (ref 0.4–2)
LACTATE SERPL-SCNC: 4.6 MMOL/L (ref 0.4–2)
LACTATE SERPL-SCNC: 5.5 MMOL/L (ref 0.4–2)
LDLC SERPL CALC-MCNC: 26.4 MG/DL (ref 0–100)
LIPID PANEL: NORMAL
LYMPHOCYTES # BLD: 1.29 K/UL (ref 0.8–3.5)
LYMPHOCYTES NFR BLD: 5 % (ref 12–49)
MAGNESIUM SERPL-MCNC: 1.4 MG/DL (ref 1.6–2.4)
MCH RBC QN AUTO: 30.7 PG (ref 26–34)
MCHC RBC AUTO-ENTMCNC: 33.1 G/DL (ref 30–36.5)
MCV RBC AUTO: 92.7 FL (ref 80–99)
MONOCYTES # BLD: 1.16 K/UL (ref 0–1)
MONOCYTES NFR BLD: 4.5 % (ref 5–13)
NEUTS SEG # BLD: 22.81 K/UL (ref 1.8–8)
NEUTS SEG NFR BLD: 88.4 % (ref 32–75)
NRBC # BLD: 0 K/UL (ref 0–0.01)
NRBC BLD-RTO: 0 PER 100 WBC
PERFORMED BY:: ABNORMAL
PLATELET # BLD AUTO: 273 K/UL (ref 150–400)
PMV BLD AUTO: 9.6 FL (ref 8.9–12.9)
POTASSIUM SERPL-SCNC: 4 MMOL/L (ref 3.5–5.1)
PROT SERPL-MCNC: 7.1 G/DL (ref 6.4–8.2)
RBC # BLD AUTO: 4.24 M/UL (ref 4.1–5.7)
RBC MORPH BLD: ABNORMAL
SARS-COV-2 RNA RESP QL NAA+PROBE: NOT DETECTED
SODIUM SERPL-SCNC: 137 MMOL/L (ref 136–145)
TRIGL SERPL-MCNC: 58 MG/DL
TROPONIN I SERPL HS-MCNC: 8 NG/L (ref 0–76)
VLDLC SERPL CALC-MCNC: 11.6 MG/DL
WBC # BLD AUTO: 25.8 K/UL (ref 4.1–11.1)

## 2025-02-16 PROCEDURE — 99285 EMERGENCY DEPT VISIT HI MDM: CPT

## 2025-02-16 PROCEDURE — 2500000003 HC RX 250 WO HCPCS: Performed by: STUDENT IN AN ORGANIZED HEALTH CARE EDUCATION/TRAINING PROGRAM

## 2025-02-16 PROCEDURE — 83735 ASSAY OF MAGNESIUM: CPT

## 2025-02-16 PROCEDURE — 6360000002 HC RX W HCPCS: Performed by: STUDENT IN AN ORGANIZED HEALTH CARE EDUCATION/TRAINING PROGRAM

## 2025-02-16 PROCEDURE — 80053 COMPREHEN METABOLIC PANEL: CPT

## 2025-02-16 PROCEDURE — 82962 GLUCOSE BLOOD TEST: CPT

## 2025-02-16 PROCEDURE — C8929 TTE W OR WO FOL WCON,DOPPLER: HCPCS

## 2025-02-16 PROCEDURE — 6360000004 HC RX CONTRAST MEDICATION

## 2025-02-16 PROCEDURE — 83036 HEMOGLOBIN GLYCOSYLATED A1C: CPT

## 2025-02-16 PROCEDURE — 6370000000 HC RX 637 (ALT 250 FOR IP): Performed by: STUDENT IN AN ORGANIZED HEALTH CARE EDUCATION/TRAINING PROGRAM

## 2025-02-16 PROCEDURE — 83880 ASSAY OF NATRIURETIC PEPTIDE: CPT

## 2025-02-16 PROCEDURE — 84484 ASSAY OF TROPONIN QUANT: CPT

## 2025-02-16 PROCEDURE — 51798 US URINE CAPACITY MEASURE: CPT

## 2025-02-16 PROCEDURE — 6370000000 HC RX 637 (ALT 250 FOR IP): Performed by: INTERNAL MEDICINE

## 2025-02-16 PROCEDURE — 94640 AIRWAY INHALATION TREATMENT: CPT

## 2025-02-16 PROCEDURE — P9047 ALBUMIN (HUMAN), 25%, 50ML: HCPCS | Performed by: STUDENT IN AN ORGANIZED HEALTH CARE EDUCATION/TRAINING PROGRAM

## 2025-02-16 PROCEDURE — 6370000000 HC RX 637 (ALT 250 FOR IP): Performed by: PHYSICIAN ASSISTANT

## 2025-02-16 PROCEDURE — 87040 BLOOD CULTURE FOR BACTERIA: CPT

## 2025-02-16 PROCEDURE — 80061 LIPID PANEL: CPT

## 2025-02-16 PROCEDURE — 2700000000 HC OXYGEN THERAPY PER DAY

## 2025-02-16 PROCEDURE — 85025 COMPLETE CBC W/AUTO DIFF WBC: CPT

## 2025-02-16 PROCEDURE — 2580000003 HC RX 258: Performed by: STUDENT IN AN ORGANIZED HEALTH CARE EDUCATION/TRAINING PROGRAM

## 2025-02-16 PROCEDURE — 2580000003 HC RX 258: Performed by: PHYSICIAN ASSISTANT

## 2025-02-16 PROCEDURE — 94761 N-INVAS EAR/PLS OXIMETRY MLT: CPT

## 2025-02-16 PROCEDURE — 83605 ASSAY OF LACTIC ACID: CPT

## 2025-02-16 PROCEDURE — 36415 COLL VENOUS BLD VENIPUNCTURE: CPT

## 2025-02-16 PROCEDURE — 71045 X-RAY EXAM CHEST 1 VIEW: CPT

## 2025-02-16 PROCEDURE — 2060000000 HC ICU INTERMEDIATE R&B

## 2025-02-16 PROCEDURE — 85379 FIBRIN DEGRADATION QUANT: CPT

## 2025-02-16 PROCEDURE — 87636 SARSCOV2 & INF A&B AMP PRB: CPT

## 2025-02-16 PROCEDURE — 2500000003 HC RX 250 WO HCPCS

## 2025-02-16 PROCEDURE — 93005 ELECTROCARDIOGRAM TRACING: CPT | Performed by: STUDENT IN AN ORGANIZED HEALTH CARE EDUCATION/TRAINING PROGRAM

## 2025-02-16 RX ORDER — ALBUMIN (HUMAN) 12.5 G/50ML
25 SOLUTION INTRAVENOUS EVERY 8 HOURS
Status: DISPENSED | OUTPATIENT
Start: 2025-02-16 | End: 2025-02-17

## 2025-02-16 RX ORDER — ACETAMINOPHEN 650 MG/1
650 SUPPOSITORY RECTAL EVERY 6 HOURS PRN
Status: DISCONTINUED | OUTPATIENT
Start: 2025-02-16 | End: 2025-02-20 | Stop reason: HOSPADM

## 2025-02-16 RX ORDER — ONDANSETRON 2 MG/ML
4 INJECTION INTRAMUSCULAR; INTRAVENOUS EVERY 6 HOURS PRN
Status: DISCONTINUED | OUTPATIENT
Start: 2025-02-16 | End: 2025-02-20 | Stop reason: HOSPADM

## 2025-02-16 RX ORDER — ONDANSETRON 4 MG/1
4 TABLET, ORALLY DISINTEGRATING ORAL EVERY 8 HOURS PRN
Status: DISCONTINUED | OUTPATIENT
Start: 2025-02-16 | End: 2025-02-20 | Stop reason: HOSPADM

## 2025-02-16 RX ORDER — SODIUM CHLORIDE 9 MG/ML
INJECTION, SOLUTION INTRAVENOUS PRN
Status: DISCONTINUED | OUTPATIENT
Start: 2025-02-16 | End: 2025-02-20 | Stop reason: HOSPADM

## 2025-02-16 RX ORDER — IPRATROPIUM BROMIDE AND ALBUTEROL SULFATE 2.5; .5 MG/3ML; MG/3ML
1 SOLUTION RESPIRATORY (INHALATION)
Status: DISCONTINUED | OUTPATIENT
Start: 2025-02-16 | End: 2025-02-17

## 2025-02-16 RX ORDER — POLYETHYLENE GLYCOL 3350 17 G/17G
17 POWDER, FOR SOLUTION ORAL DAILY PRN
Status: DISCONTINUED | OUTPATIENT
Start: 2025-02-16 | End: 2025-02-20 | Stop reason: HOSPADM

## 2025-02-16 RX ORDER — IPRATROPIUM BROMIDE AND ALBUTEROL SULFATE 2.5; .5 MG/3ML; MG/3ML
1 SOLUTION RESPIRATORY (INHALATION) EVERY 4 HOURS PRN
Status: DISCONTINUED | OUTPATIENT
Start: 2025-02-16 | End: 2025-02-16

## 2025-02-16 RX ORDER — OSELTAMIVIR PHOSPHATE 75 MG/1
75 CAPSULE ORAL 2 TIMES DAILY
Status: DISCONTINUED | OUTPATIENT
Start: 2025-02-16 | End: 2025-02-20 | Stop reason: HOSPADM

## 2025-02-16 RX ORDER — GUAIFENESIN 200 MG/10ML
200 LIQUID ORAL EVERY 4 HOURS PRN
Status: DISCONTINUED | OUTPATIENT
Start: 2025-02-16 | End: 2025-02-16

## 2025-02-16 RX ORDER — SODIUM CHLORIDE 0.9 % (FLUSH) 0.9 %
5-40 SYRINGE (ML) INJECTION EVERY 12 HOURS SCHEDULED
Status: DISCONTINUED | OUTPATIENT
Start: 2025-02-16 | End: 2025-02-20 | Stop reason: HOSPADM

## 2025-02-16 RX ORDER — SODIUM CHLORIDE 0.9 % (FLUSH) 0.9 %
5-40 SYRINGE (ML) INJECTION PRN
Status: DISCONTINUED | OUTPATIENT
Start: 2025-02-16 | End: 2025-02-20 | Stop reason: HOSPADM

## 2025-02-16 RX ORDER — ENOXAPARIN SODIUM 100 MG/ML
60 INJECTION SUBCUTANEOUS 2 TIMES DAILY
Status: DISCONTINUED | OUTPATIENT
Start: 2025-02-16 | End: 2025-02-20 | Stop reason: HOSPADM

## 2025-02-16 RX ORDER — DEXTROSE MONOHYDRATE 100 MG/ML
INJECTION, SOLUTION INTRAVENOUS CONTINUOUS PRN
Status: DISCONTINUED | OUTPATIENT
Start: 2025-02-16 | End: 2025-02-20 | Stop reason: HOSPADM

## 2025-02-16 RX ORDER — MAGNESIUM SULFATE HEPTAHYDRATE 40 MG/ML
2000 INJECTION, SOLUTION INTRAVENOUS ONCE
Status: COMPLETED | OUTPATIENT
Start: 2025-02-16 | End: 2025-02-16

## 2025-02-16 RX ORDER — SODIUM CHLORIDE, SODIUM LACTATE, POTASSIUM CHLORIDE, AND CALCIUM CHLORIDE .6; .31; .03; .02 G/100ML; G/100ML; G/100ML; G/100ML
1000 INJECTION, SOLUTION INTRAVENOUS ONCE
Status: COMPLETED | OUTPATIENT
Start: 2025-02-16 | End: 2025-02-16

## 2025-02-16 RX ORDER — SODIUM CHLORIDE 9 MG/ML
INJECTION, SOLUTION INTRAVENOUS CONTINUOUS
Status: DISCONTINUED | OUTPATIENT
Start: 2025-02-16 | End: 2025-02-17

## 2025-02-16 RX ORDER — GLUCAGON 1 MG/ML
1 KIT INJECTION PRN
Status: DISCONTINUED | OUTPATIENT
Start: 2025-02-16 | End: 2025-02-20 | Stop reason: HOSPADM

## 2025-02-16 RX ORDER — LIDOCAINE 4 G/G
1 PATCH TOPICAL DAILY
Status: DISCONTINUED | OUTPATIENT
Start: 2025-02-16 | End: 2025-02-20 | Stop reason: HOSPADM

## 2025-02-16 RX ORDER — MIDODRINE HYDROCHLORIDE 5 MG/1
10 TABLET ORAL
Status: DISCONTINUED | OUTPATIENT
Start: 2025-02-16 | End: 2025-02-19

## 2025-02-16 RX ORDER — ACETAMINOPHEN 325 MG/1
650 TABLET ORAL EVERY 6 HOURS PRN
Status: DISCONTINUED | OUTPATIENT
Start: 2025-02-16 | End: 2025-02-20 | Stop reason: HOSPADM

## 2025-02-16 RX ORDER — MAGNESIUM SULFATE IN WATER 40 MG/ML
2000 INJECTION, SOLUTION INTRAVENOUS PRN
Status: DISCONTINUED | OUTPATIENT
Start: 2025-02-16 | End: 2025-02-20 | Stop reason: HOSPADM

## 2025-02-16 RX ORDER — SODIUM CHLORIDE, SODIUM LACTATE, POTASSIUM CHLORIDE, AND CALCIUM CHLORIDE .6; .31; .03; .02 G/100ML; G/100ML; G/100ML; G/100ML
30 INJECTION, SOLUTION INTRAVENOUS ONCE
Status: DISCONTINUED | OUTPATIENT
Start: 2025-02-16 | End: 2025-02-16

## 2025-02-16 RX ORDER — PANTOPRAZOLE SODIUM 40 MG/1
40 TABLET, DELAYED RELEASE ORAL
Status: DISCONTINUED | OUTPATIENT
Start: 2025-02-16 | End: 2025-02-20 | Stop reason: HOSPADM

## 2025-02-16 RX ORDER — GUAIFENESIN 600 MG/1
600 TABLET, EXTENDED RELEASE ORAL 2 TIMES DAILY
Status: DISCONTINUED | OUTPATIENT
Start: 2025-02-16 | End: 2025-02-20 | Stop reason: HOSPADM

## 2025-02-16 RX ORDER — INSULIN LISPRO 100 [IU]/ML
0-4 INJECTION, SOLUTION INTRAVENOUS; SUBCUTANEOUS
Status: DISCONTINUED | OUTPATIENT
Start: 2025-02-16 | End: 2025-02-20 | Stop reason: HOSPADM

## 2025-02-16 RX ADMIN — DOXYCYCLINE 100 MG: 100 INJECTION, POWDER, LYOPHILIZED, FOR SOLUTION INTRAVENOUS at 04:49

## 2025-02-16 RX ADMIN — OSELTAMIVIR PHOSPHATE 75 MG: 75 CAPSULE ORAL at 20:58

## 2025-02-16 RX ADMIN — SULFUR HEXAFLUORIDE 5 ML: 60.7; .19; .19 INJECTION, POWDER, LYOPHILIZED, FOR SUSPENSION INTRAVENOUS; INTRAVESICAL at 17:06

## 2025-02-16 RX ADMIN — IPRATROPIUM BROMIDE AND ALBUTEROL SULFATE 1 DOSE: 2.5; .5 SOLUTION RESPIRATORY (INHALATION) at 12:37

## 2025-02-16 RX ADMIN — SODIUM CHLORIDE, POTASSIUM CHLORIDE, SODIUM LACTATE AND CALCIUM CHLORIDE 1000 ML: 600; 310; 30; 20 INJECTION, SOLUTION INTRAVENOUS at 16:08

## 2025-02-16 RX ADMIN — GUAIFENESIN 600 MG: 600 TABLET ORAL at 20:58

## 2025-02-16 RX ADMIN — OSELTAMIVIR PHOSPHATE 75 MG: 75 CAPSULE ORAL at 08:55

## 2025-02-16 RX ADMIN — PANTOPRAZOLE SODIUM 40 MG: 40 TABLET, DELAYED RELEASE ORAL at 08:55

## 2025-02-16 RX ADMIN — ALBUMIN (HUMAN) 25 G: 0.25 INJECTION, SOLUTION INTRAVENOUS at 20:40

## 2025-02-16 RX ADMIN — GUAIFENESIN 600 MG: 600 TABLET ORAL at 12:42

## 2025-02-16 RX ADMIN — ENOXAPARIN SODIUM 60 MG: 100 INJECTION SUBCUTANEOUS at 20:58

## 2025-02-16 RX ADMIN — IPRATROPIUM BROMIDE AND ALBUTEROL SULFATE 1 DOSE: 2.5; .5 SOLUTION RESPIRATORY (INHALATION) at 20:16

## 2025-02-16 RX ADMIN — ENOXAPARIN SODIUM 60 MG: 100 INJECTION SUBCUTANEOUS at 08:55

## 2025-02-16 RX ADMIN — CEFTRIAXONE SODIUM 1000 MG: 1 INJECTION, POWDER, FOR SOLUTION INTRAMUSCULAR; INTRAVENOUS at 04:41

## 2025-02-16 RX ADMIN — MIDODRINE HYDROCHLORIDE 10 MG: 5 TABLET ORAL at 16:06

## 2025-02-16 RX ADMIN — AZITHROMYCIN MONOHYDRATE 500 MG: 500 INJECTION, POWDER, LYOPHILIZED, FOR SOLUTION INTRAVENOUS at 16:12

## 2025-02-16 RX ADMIN — MAGNESIUM SULFATE HEPTAHYDRATE 2000 MG: 40 INJECTION, SOLUTION INTRAVENOUS at 04:57

## 2025-02-16 RX ADMIN — ALBUMIN (HUMAN) 25 G: 0.25 INJECTION, SOLUTION INTRAVENOUS at 06:13

## 2025-02-16 RX ADMIN — SODIUM CHLORIDE: 0.9 INJECTION, SOLUTION INTRAVENOUS at 06:08

## 2025-02-16 RX ADMIN — SODIUM CHLORIDE, PRESERVATIVE FREE 10 ML: 5 INJECTION INTRAVENOUS at 20:58

## 2025-02-16 RX ADMIN — INSULIN LISPRO 1 UNITS: 100 INJECTION, SOLUTION INTRAVENOUS; SUBCUTANEOUS at 12:43

## 2025-02-16 ASSESSMENT — PAIN SCALES - GENERAL
PAINLEVEL_OUTOF10: 9
PAINLEVEL_OUTOF10: 0

## 2025-02-16 NOTE — ED PROVIDER NOTES
agreement and understanding for the need to be admitted and for the admission diagnosis.    Additional Disposition Considerations:      Smoking Cessation:Not Applicable    DISCHARGE PLAN:  1.   Current Discharge Medication List        STOP taking these medications       digoxin (LANOXIN) 125 MCG tablet Comments:   Reason for Stopping:         dilTIAZem (TIAZAC) 120 MG extended release capsule Comments:   Reason for Stopping:         furosemide (LASIX) 20 MG tablet Comments:   Reason for Stopping:         levoFLOXacin (LEVAQUIN) 750 MG tablet Comments:   Reason for Stopping:         metoprolol tartrate (LOPRESSOR) 25 MG tablet Comments:   Reason for Stopping:         rivaroxaban (XARELTO) 20 MG TABS tablet Comments:   Reason for Stoppin. No follow-up provider specified.  3.  Return to ED if worse    4.      Medication List      You have not been prescribed any medications.       5. Discontinued Medications:   Current Discharge Medication List        STOP taking these medications       digoxin (LANOXIN) 125 MCG tablet Comments:   Reason for Stopping:         dilTIAZem (TIAZAC) 120 MG extended release capsule Comments:   Reason for Stopping:         furosemide (LASIX) 20 MG tablet Comments:   Reason for Stopping:         levoFLOXacin (LEVAQUIN) 750 MG tablet Comments:   Reason for Stopping:         metoprolol tartrate (LOPRESSOR) 25 MG tablet Comments:   Reason for Stopping:         rivaroxaban (XARELTO) 20 MG TABS tablet Comments:   Reason for Stopping:               Procedures     Unless otherwise noted below, none.    Performed by: Romain Mcgovern MD   Procedures      Critical Care Time     CRITICAL CARE NOTE :  8:42 AM    Critical care time is being documented due to the fulfillment of at least one of the following:    - Critical conditions: condition that acutely impairs one or more vital organ systems such that there is a high probability of imminent or life-threatening deterioration in

## 2025-02-16 NOTE — ED NOTES
Called Rapid Response for suspected sepsis. Pt vitals taken were showing softer bp from prior collection as well as temperature drop with increased respirations and hr. Providers Mckay Sy and Beni arrived at bedside and orders Sepsis bundle. Informed Mey of orders and continued to monitor pt. PT has since reported overall improvement of condition.

## 2025-02-16 NOTE — CARE COORDINATION
02/16/25 1145   Service Assessment   Patient Orientation Alert and Oriented   Cognition Alert   History Provided By Patient   Primary Caregiver Family   Support Systems Parent;Family Members   Patient's Healthcare Decision Maker is: Legal Next of Kin   PCP Verified by CM No   Last Visit to PCP Within last two years   Prior Functional Level Assistance with the following:;Cooking;Housework;Shopping   Current Functional Level Assistance with the following:;Cooking;Housework;Shopping   Can patient return to prior living arrangement Yes   Ability to make needs known: Good   Family able to assist with home care needs: Yes   Would you like for me to discuss the discharge plan with any other family members/significant others, and if so, who? Yes  (Mother, Sandie)   Financial Resources Medicare   Community Resources None   Social/Functional History   Lives With Parent   Type of Home Apartment   Home Equipment None   Services At/After Discharge   Transition of Care Consult (CM Consult) Discharge Planning     1145: CM met patient at bedside to complete DCP assessment. Demos verified as accurate. Patient reports living with his mother in a ground level apartment. Patient relays receiving assistance, as needed and having no DME. Previous HH. No prior IRF/SNF. Preferred pharmacy stated as [facility] in [place]: system updated. When medically stable for discharge, patient reports he will need medical transport home. CM will continue to follow patient and recs of medical team.    Current Dispo: Home with family    Advance Care Planning     General Advance Care Planning (ACP) Conversation    Date of Conversation: 2/16/2025  Conducted with: Patient with Decision Making Capacity  Other persons present: None    Healthcare Decision Maker:   Primary Decision Maker: Yaneth Nolan - Brother/Sister - [phone]       Content/Action Overview:  Has NO ACP documents-Information provided  Reviewed DNR/DNI and patient elects Full Code

## 2025-02-16 NOTE — H&P
Genitourinary:  Negative for dysuria and hematuria.   Musculoskeletal:  Positive for myalgias. Negative for back pain, neck pain and neck stiffness.   Skin: Negative.    Allergic/Immunologic: Negative.    Neurological:  Negative for dizziness, weakness, numbness and headaches.   Hematological: Negative.    Psychiatric/Behavioral: Negative.     All other systems reviewed and are negative.     PHYSICAL EXAM:  Physical Exam  Vitals reviewed.   Constitutional:       General: He is not in acute distress.     Appearance: He is obese. He is not ill-appearing.   HENT:      Head: Normocephalic.   Eyes:      Extraocular Movements: Extraocular movements intact.   Cardiovascular:      Rate and Rhythm: Regular rhythm. Tachycardia present.   Pulmonary:      Effort: Pulmonary effort is normal. No respiratory distress.      Breath sounds: No wheezing.      Comments: 2 L of supplemental oxygen via nasal cannula  Decreased breath sounds in all fields secondary to patient's body habitus  Abdominal:      General: Bowel sounds are normal.      Palpations: Abdomen is soft.      Tenderness: There is no abdominal tenderness.   Musculoskeletal:         General: No tenderness. Normal range of motion.      Cervical back: Normal range of motion and neck supple. No tenderness.      Right lower leg: Edema present.      Left lower leg: Edema present.   Neurological:      Mental Status: He is alert and oriented to person, place, and time.         LAB DATA REVIEWED:    Recent Results (from the past 12 hour(s))   CBC with Auto Differential    Collection Time: 02/16/25  2:44 AM   Result Value Ref Range    WBC 25.8 (H) 4.1 - 11.1 K/uL    RBC 4.24 4.10 - 5.70 M/uL    Hemoglobin 13.0 12.1 - 17.0 g/dL    Hematocrit 39.3 36.6 - 50.3 %    MCV 92.7 80.0 - 99.0 FL    MCH 30.7 26.0 - 34.0 PG    MCHC 33.1 30.0 - 36.5 g/dL    RDW 14.2 11.5 - 14.5 %    Platelets 273 150 - 400 K/uL    MPV 9.6 8.9 - 12.9 FL    Nucleated RBCs 0.0 0.0  WBC    nRBC 0.00 0.00

## 2025-02-16 NOTE — ED NOTES
ED TO INPATIENT SBAR HANDOFF    Patient Name: Faisal Baird Jr.   Preferred Name: Faisal  : 1970  54 y.o.   Family/Caregiver Present: no   Code Status Order: Full Code  PO Status: NPO:No  Telemetry Order:   C-SSRS: Risk of Suicide: No Risk  Sitter no     Restraints:     Sepsis Risk Score      Situation  Chief Complaint   Patient presents with    Shortness of Breath     Brief Description of Patient's Condition: Pt came in for SOB and abdominal pain. Pt being admitted for Flu and sepsis. Pt first lactic 6.99. Pt has been hypotensive but is responding well to antibiotics and albumin. Alert and oriented.   Mental Status: oriented, alert, and coherent  Arrived from:Home  Imaging:   XR CHEST 1 VIEW   Final Result   Mild pulmonary edema.         Electronically signed by Will Negron        Abnormal labs:   Abnormal Labs Reviewed   COVID-19 & INFLUENZA COMBO - Abnormal; Notable for the following components:       Result Value    Rapid Influenza A By PCR DETECTED (*)     All other components within normal limits   CBC WITH AUTO DIFFERENTIAL - Abnormal; Notable for the following components:    WBC 25.8 (*)     Neutrophils % 88.4 (*)     Lymphocytes % 5.0 (*)     Monocytes % 4.5 (*)     Immature Granulocytes % 1.1 (*)     Neutrophils Absolute 22.81 (*)     Monocytes Absolute 1.16 (*)     Immature Granulocytes Absolute 0.28 (*)     All other components within normal limits   COMPREHENSIVE METABOLIC PANEL - Abnormal; Notable for the following components:    Glucose 181 (*)     Creatinine 2.06 (*)     BUN/Creatinine Ratio 4 (*)     Est, Glom Filt Rate 38 (*)     Calcium 8.1 (*)     Total Bilirubin 3.2 (*)     AST 43 (*)     Alk Phosphatase 150 (*)     Albumin 2.4 (*)     Globulin 4.7 (*)     Albumin/Globulin Ratio 0.5 (*)     All other components within normal limits   MAGNESIUM - Abnormal; Notable for the following components:    Magnesium 1.4 (*)     All other components within normal limits   BRAIN NATRIURETIC

## 2025-02-16 NOTE — SIGNIFICANT EVENT
Faisal patient 54-year-old male morbidly obese, with PMH significant for proximal atrial fibrillation, DVT, lately not on any anticoagulation, GERD and hypertension, bedbound for last 2 years in setting of his bilateral lower extremity problem and weakness was brought into ED with complaints of cough, congestion and shortness of breath.  On arrival patient was noted to be tachycardic, tachypneic and hypertensive.  Pulmonology consulted.  Rapid response was called around 3:30 PM as patient became hypotensive with systolic blood pressure as low as 60, most recent recorded was 88/55.  Lactic acid as of this morning was 6.84<6.99.  BNP was elevated at 1577.  Sepsis protocol initiated.  Patient is yet to receive echo, requested bedside RN Juve to ask cardiology department to perform urgent echo and duplex lower extremity.  Based on patient's size and weight he is unable to undergo chest imaging.  Bedside RN to call and find if any other radiology department in the area can perform the imaging.  Currently on ceftriaxone and azithromycin.  Fluid administration is limited in setting of suspected CHF, if MAP continues to remain<65 and systolic blood pressure<90, in spite of being adequately fluid resuscitated, will require transfer to ICU for pressor management along with diuresis.

## 2025-02-17 ENCOUNTER — APPOINTMENT (OUTPATIENT)
Facility: HOSPITAL | Age: 55
DRG: 871 | End: 2025-02-17
Payer: MEDICARE

## 2025-02-17 LAB
ALBUMIN SERPL-MCNC: 2.3 G/DL (ref 3.5–5)
ALBUMIN/GLOB SERPL: 0.5 (ref 1.1–2.2)
ALP SERPL-CCNC: 100 U/L (ref 45–117)
ALT SERPL-CCNC: 19 U/L (ref 12–78)
ANION GAP SERPL CALC-SCNC: 7 MMOL/L (ref 2–12)
AST SERPL W P-5'-P-CCNC: 14 U/L (ref 15–37)
BASOPHILS # BLD: 0 K/UL (ref 0–0.1)
BASOPHILS NFR BLD: 0 % (ref 0–1)
BILIRUB SERPL-MCNC: 2.6 MG/DL (ref 0.2–1)
BNP SERPL-MCNC: 1938 PG/ML
BUN SERPL-MCNC: 25 MG/DL (ref 6–20)
BUN/CREAT SERPL: 9 (ref 12–20)
CA-I BLD-MCNC: 8.3 MG/DL (ref 8.5–10.1)
CHLORIDE SERPL-SCNC: 102 MMOL/L (ref 97–108)
CO2 SERPL-SCNC: 25 MMOL/L (ref 21–32)
CREAT SERPL-MCNC: 2.69 MG/DL (ref 0.7–1.3)
DIFFERENTIAL METHOD BLD: ABNORMAL
ECHO AO ASC DIAM: 3.4 CM
ECHO AO ASCENDING AORTA INDEX: 1.11 CM/M2
ECHO AO ROOT DIAM: 3.8 CM
ECHO AO ROOT INDEX: 1.25 CM/M2
ECHO AV AREA PEAK VELOCITY: 3.1 CM2
ECHO AV AREA VTI: 3.2 CM2
ECHO AV AREA/BSA PEAK VELOCITY: 1 CM2/M2
ECHO AV AREA/BSA VTI: 1 CM2/M2
ECHO AV MEAN GRADIENT: 5 MMHG
ECHO AV MEAN VELOCITY: 1 M/S
ECHO AV PEAK GRADIENT: 9 MMHG
ECHO AV PEAK VELOCITY: 1.5 M/S
ECHO AV VELOCITY RATIO: 0.73
ECHO AV VTI: 23.6 CM
ECHO BSA: 3.32 M2
ECHO BSA: 3.32 M2
ECHO EST RA PRESSURE: 8 MMHG
ECHO IVC EXP: 2.1 CM
ECHO LV E' LATERAL VELOCITY: 8.72 CM/S
ECHO LV E' SEPTAL VELOCITY: 12.3 CM/S
ECHO LV EF PHYSICIAN: 60 %
ECHO LV FRACTIONAL SHORTENING: 45 % (ref 28–44)
ECHO LV INTERNAL DIMENSION DIASTOLE INDEX: 1.74 CM/M2
ECHO LV INTERNAL DIMENSION DIASTOLIC: 5.3 CM (ref 4.2–5.9)
ECHO LV INTERNAL DIMENSION SYSTOLIC INDEX: 0.95 CM/M2
ECHO LV INTERNAL DIMENSION SYSTOLIC: 2.9 CM
ECHO LV IVSD: 1.1 CM (ref 0.6–1)
ECHO LV MASS 2D: 187.3 G (ref 88–224)
ECHO LV MASS INDEX 2D: 61.4 G/M2 (ref 49–115)
ECHO LV POSTERIOR WALL DIASTOLIC: 0.8 CM (ref 0.6–1)
ECHO LV RELATIVE WALL THICKNESS RATIO: 0.3
ECHO LVOT AREA: 4.2 CM2
ECHO LVOT AV VTI INDEX: 0.77
ECHO LVOT DIAM: 2.3 CM
ECHO LVOT MEAN GRADIENT: 3 MMHG
ECHO LVOT PEAK GRADIENT: 5 MMHG
ECHO LVOT PEAK VELOCITY: 1.1 M/S
ECHO LVOT STROKE VOLUME INDEX: 24.6 ML/M2
ECHO LVOT SV: 75.2 ML
ECHO LVOT VTI: 18.1 CM
ECHO MV A VELOCITY: 0.94 M/S
ECHO MV AREA VTI: 4.1 CM2
ECHO MV E DECELERATION TIME (DT): 153 MS
ECHO MV E VELOCITY: 0.96 M/S
ECHO MV E/A RATIO: 1.02
ECHO MV E/E' LATERAL: 11.01
ECHO MV E/E' RATIO (AVERAGED): 9.41
ECHO MV E/E' SEPTAL: 7.8
ECHO MV LVOT VTI INDEX: 1.01
ECHO MV MAX VELOCITY: 0.9 M/S
ECHO MV MEAN GRADIENT: 2 MMHG
ECHO MV MEAN VELOCITY: 0.7 M/S
ECHO MV PEAK GRADIENT: 3 MMHG
ECHO MV VTI: 18.3 CM
ECHO PV ACCELERATION TIME (AT): 100 MS
ECHO PV MAX VELOCITY: 1.1 M/S
ECHO PV PEAK GRADIENT: 5 MMHG
ECHO RIGHT VENTRICULAR SYSTOLIC PRESSURE (RVSP): 28 MMHG
ECHO RV FREE WALL PEAK S': 20.1 CM/S
ECHO RV TAPSE: 2.8 CM (ref 1.7–?)
ECHO TV REGURGITANT MAX VELOCITY: 2.25 M/S
ECHO TV REGURGITANT PEAK GRADIENT: 20 MMHG
EOSINOPHIL # BLD: 0 K/UL (ref 0–0.4)
EOSINOPHIL NFR BLD: 0 % (ref 0–7)
ERYTHROCYTE [DISTWIDTH] IN BLOOD BY AUTOMATED COUNT: 14 % (ref 11.5–14.5)
GLOBULIN SER CALC-MCNC: 4.5 G/DL (ref 2–4)
GLUCOSE BLD STRIP.AUTO-MCNC: 128 MG/DL (ref 65–100)
GLUCOSE BLD STRIP.AUTO-MCNC: 131 MG/DL (ref 65–100)
GLUCOSE BLD STRIP.AUTO-MCNC: 158 MG/DL (ref 65–100)
GLUCOSE BLD STRIP.AUTO-MCNC: 163 MG/DL (ref 65–100)
GLUCOSE SERPL-MCNC: 131 MG/DL (ref 65–100)
HCT VFR BLD AUTO: 32.6 % (ref 36.6–50.3)
HGB BLD-MCNC: 11 G/DL (ref 12.1–17)
IMM GRANULOCYTES # BLD AUTO: 0 K/UL
IMM GRANULOCYTES NFR BLD AUTO: 0 %
LYMPHOCYTES # BLD: 2.39 K/UL (ref 0.8–3.5)
LYMPHOCYTES NFR BLD: 10 % (ref 12–49)
MAGNESIUM SERPL-MCNC: 1.8 MG/DL (ref 1.6–2.4)
MCH RBC QN AUTO: 30.6 PG (ref 26–34)
MCHC RBC AUTO-ENTMCNC: 33.7 G/DL (ref 30–36.5)
MCV RBC AUTO: 90.8 FL (ref 80–99)
METAMYELOCYTES NFR BLD MANUAL: 1 %
MONOCYTES # BLD: 0.72 K/UL (ref 0–1)
MONOCYTES NFR BLD: 3 % (ref 5–13)
NEUTS BAND NFR BLD MANUAL: 10 % (ref 0–6)
NEUTS SEG # BLD: 20.55 K/UL (ref 1.8–8)
NEUTS SEG NFR BLD: 76 % (ref 32–75)
NRBC # BLD: 0 K/UL (ref 0–0.01)
NRBC BLD-RTO: 0 PER 100 WBC
PERFORMED BY:: ABNORMAL
PLATELET # BLD AUTO: 231 K/UL (ref 150–400)
PMV BLD AUTO: 10.3 FL (ref 8.9–12.9)
POTASSIUM SERPL-SCNC: 3.6 MMOL/L (ref 3.5–5.1)
PROCALCITONIN SERPL-MCNC: 19.02 NG/ML
PROT SERPL-MCNC: 6.8 G/DL (ref 6.4–8.2)
RBC # BLD AUTO: 3.59 M/UL (ref 4.1–5.7)
RBC MORPH BLD: ABNORMAL
SODIUM SERPL-SCNC: 134 MMOL/L (ref 136–145)
VAS LEFT FV RFX: 1 S
VAS LEFT POP RFX: 1 S
WBC # BLD AUTO: 23.9 K/UL (ref 4.1–11.1)
WBC MORPH BLD: ABNORMAL

## 2025-02-17 PROCEDURE — 94640 AIRWAY INHALATION TREATMENT: CPT

## 2025-02-17 PROCEDURE — 6360000002 HC RX W HCPCS: Performed by: STUDENT IN AN ORGANIZED HEALTH CARE EDUCATION/TRAINING PROGRAM

## 2025-02-17 PROCEDURE — 2060000000 HC ICU INTERMEDIATE R&B

## 2025-02-17 PROCEDURE — 6370000000 HC RX 637 (ALT 250 FOR IP): Performed by: PHYSICIAN ASSISTANT

## 2025-02-17 PROCEDURE — 97166 OT EVAL MOD COMPLEX 45 MIN: CPT

## 2025-02-17 PROCEDURE — 97530 THERAPEUTIC ACTIVITIES: CPT

## 2025-02-17 PROCEDURE — 76770 US EXAM ABDO BACK WALL COMP: CPT

## 2025-02-17 PROCEDURE — 85025 COMPLETE CBC W/AUTO DIFF WBC: CPT

## 2025-02-17 PROCEDURE — 6360000002 HC RX W HCPCS: Performed by: PHYSICIAN ASSISTANT

## 2025-02-17 PROCEDURE — 94761 N-INVAS EAR/PLS OXIMETRY MLT: CPT

## 2025-02-17 PROCEDURE — 2580000003 HC RX 258: Performed by: STUDENT IN AN ORGANIZED HEALTH CARE EDUCATION/TRAINING PROGRAM

## 2025-02-17 PROCEDURE — 6370000000 HC RX 637 (ALT 250 FOR IP): Performed by: INTERNAL MEDICINE

## 2025-02-17 PROCEDURE — 83880 ASSAY OF NATRIURETIC PEPTIDE: CPT

## 2025-02-17 PROCEDURE — 80053 COMPREHEN METABOLIC PANEL: CPT

## 2025-02-17 PROCEDURE — 6370000000 HC RX 637 (ALT 250 FOR IP): Performed by: STUDENT IN AN ORGANIZED HEALTH CARE EDUCATION/TRAINING PROGRAM

## 2025-02-17 PROCEDURE — 84145 PROCALCITONIN (PCT): CPT

## 2025-02-17 PROCEDURE — 36415 COLL VENOUS BLD VENIPUNCTURE: CPT

## 2025-02-17 PROCEDURE — 2500000003 HC RX 250 WO HCPCS

## 2025-02-17 PROCEDURE — 83735 ASSAY OF MAGNESIUM: CPT

## 2025-02-17 PROCEDURE — P9047 ALBUMIN (HUMAN), 25%, 50ML: HCPCS | Performed by: PHYSICIAN ASSISTANT

## 2025-02-17 PROCEDURE — 2700000000 HC OXYGEN THERAPY PER DAY

## 2025-02-17 PROCEDURE — 2500000003 HC RX 250 WO HCPCS: Performed by: STUDENT IN AN ORGANIZED HEALTH CARE EDUCATION/TRAINING PROGRAM

## 2025-02-17 PROCEDURE — 97161 PT EVAL LOW COMPLEX 20 MIN: CPT

## 2025-02-17 PROCEDURE — 82962 GLUCOSE BLOOD TEST: CPT

## 2025-02-17 PROCEDURE — 97535 SELF CARE MNGMENT TRAINING: CPT

## 2025-02-17 PROCEDURE — 93970 EXTREMITY STUDY: CPT

## 2025-02-17 RX ORDER — SODIUM CHLORIDE 9 MG/ML
INJECTION, SOLUTION INTRAVENOUS CONTINUOUS
Status: DISCONTINUED | OUTPATIENT
Start: 2025-02-17 | End: 2025-02-18

## 2025-02-17 RX ORDER — IPRATROPIUM BROMIDE AND ALBUTEROL SULFATE 2.5; .5 MG/3ML; MG/3ML
1 SOLUTION RESPIRATORY (INHALATION)
Status: DISCONTINUED | OUTPATIENT
Start: 2025-02-17 | End: 2025-02-20 | Stop reason: HOSPADM

## 2025-02-17 RX ORDER — ALBUMIN (HUMAN) 12.5 G/50ML
25 SOLUTION INTRAVENOUS EVERY 8 HOURS
Status: COMPLETED | OUTPATIENT
Start: 2025-02-17 | End: 2025-02-18

## 2025-02-17 RX ADMIN — ALBUMIN (HUMAN) 25 G: 0.25 INJECTION, SOLUTION INTRAVENOUS at 23:10

## 2025-02-17 RX ADMIN — SODIUM CHLORIDE, PRESERVATIVE FREE 10 ML: 5 INJECTION INTRAVENOUS at 10:15

## 2025-02-17 RX ADMIN — OSELTAMIVIR PHOSPHATE 75 MG: 75 CAPSULE ORAL at 10:13

## 2025-02-17 RX ADMIN — ACETAMINOPHEN 650 MG: 325 TABLET ORAL at 22:05

## 2025-02-17 RX ADMIN — ALBUMIN (HUMAN) 25 G: 0.25 INJECTION, SOLUTION INTRAVENOUS at 14:39

## 2025-02-17 RX ADMIN — GUAIFENESIN 600 MG: 600 TABLET ORAL at 10:13

## 2025-02-17 RX ADMIN — IPRATROPIUM BROMIDE AND ALBUTEROL SULFATE 1 DOSE: 2.5; .5 SOLUTION RESPIRATORY (INHALATION) at 19:40

## 2025-02-17 RX ADMIN — CEFTRIAXONE SODIUM 1000 MG: 1 INJECTION, POWDER, FOR SOLUTION INTRAMUSCULAR; INTRAVENOUS at 05:23

## 2025-02-17 RX ADMIN — MIDODRINE HYDROCHLORIDE 10 MG: 5 TABLET ORAL at 13:41

## 2025-02-17 RX ADMIN — SODIUM CHLORIDE, PRESERVATIVE FREE 10 ML: 5 INJECTION INTRAVENOUS at 22:08

## 2025-02-17 RX ADMIN — OSELTAMIVIR PHOSPHATE 75 MG: 75 CAPSULE ORAL at 22:05

## 2025-02-17 RX ADMIN — MICONAZOLE NITRATE: 20 POWDER TOPICAL at 10:15

## 2025-02-17 RX ADMIN — MIDODRINE HYDROCHLORIDE 10 MG: 5 TABLET ORAL at 10:13

## 2025-02-17 RX ADMIN — ENOXAPARIN SODIUM 60 MG: 100 INJECTION SUBCUTANEOUS at 10:13

## 2025-02-17 RX ADMIN — PANTOPRAZOLE SODIUM 40 MG: 40 TABLET, DELAYED RELEASE ORAL at 05:23

## 2025-02-17 RX ADMIN — AZITHROMYCIN MONOHYDRATE 500 MG: 500 INJECTION, POWDER, LYOPHILIZED, FOR SOLUTION INTRAVENOUS at 20:38

## 2025-02-17 RX ADMIN — MICONAZOLE NITRATE: 20 POWDER TOPICAL at 22:18

## 2025-02-17 RX ADMIN — IPRATROPIUM BROMIDE AND ALBUTEROL SULFATE 1 DOSE: 2.5; .5 SOLUTION RESPIRATORY (INHALATION) at 07:48

## 2025-02-17 RX ADMIN — ENOXAPARIN SODIUM 60 MG: 100 INJECTION SUBCUTANEOUS at 22:05

## 2025-02-17 RX ADMIN — GUAIFENESIN 600 MG: 600 TABLET ORAL at 22:06

## 2025-02-17 ASSESSMENT — ENCOUNTER SYMPTOMS
SHORTNESS OF BREATH: 1
BACK PAIN: 0
NAUSEA: 0
ABDOMINAL PAIN: 0
VOMITING: 0
WHEEZING: 1
COUGH: 0

## 2025-02-17 ASSESSMENT — PAIN SCALES - GENERAL
PAINLEVEL_OUTOF10: 9
PAINLEVEL_OUTOF10: 6

## 2025-02-17 ASSESSMENT — PAIN DESCRIPTION - DESCRIPTORS: DESCRIPTORS: SHARP

## 2025-02-17 ASSESSMENT — PAIN DESCRIPTION - LOCATION: LOCATION: RIB CAGE;OTHER (COMMENT)

## 2025-02-17 ASSESSMENT — PAIN - FUNCTIONAL ASSESSMENT: PAIN_FUNCTIONAL_ASSESSMENT: ACTIVITIES ARE NOT PREVENTED

## 2025-02-17 ASSESSMENT — PAIN DESCRIPTION - ORIENTATION: ORIENTATION: LEFT

## 2025-02-17 NOTE — CONSULTS
NAME:  Faisal Baird Jr.   :   1970   MRN:   [id number]     ATTENDING: Lauren Randall MD  PCP:  Delonte Rowe DO    Date/Time:  2025       Subjective:   REQUESTING PHYSICIAN:  Jeffery Mathias PA-C  REASON FOR CONSULT:   SIMIN    History of presenting illness:    Faisal Baird is a 54-year-old male with past medical history including HTN, hx of DVT, hx of atrial fibrillation, penile cancer, GERD, bed bound, and class III obesity who presented to the emergency room with complaint of shortness of breath after being exposed to family members who were ill. Patient tested positive for influenza A. Initial labs revealed creatinine of 2.06 prompting a nephrology consultation.   Patient was seen and examined at bedside. He is awake, alert, and well-oriented, in no acute distress. Reports episodes of diarrhea beginning two weeks prior to admission. Reports decreased frequency since admission but still experiencing a couple episodes daily. Denies shortness of breath currently, % on 1 L NC at time of visit. Denies chest pain, abdominal pain, urinary symptoms including retention, fever or chills. Reports chronic BLE swelling.   Repeat creatinine today is 2.64. CXR shows mild pulmonary edema.  Patient has been hypotensive since admission, requiring midodrine and albumin.   Baseline creatinine is unknown, last creatinine on record was 0.73 in 2022.  Lasix noted on home med list.    Past Medical History:   Diagnosis Date    Arrhythmia     afib, dx 2022    Cancer (HCC)     cancer of the penis, removed about 3 years    DVT (deep venous thrombosis) (HCC)     GERD (gastroesophageal reflux disease)     Hypertension 2020    Morbid obesity       Past Surgical History:   Procedure Laterality Date    ORTHOPEDIC SURGERY      Ankle    ORTHOPEDIC SURGERY      Tumors removed from left knee     UROLOGICAL SURGERY      Penile surgery     Social History     Tobacco Use    Smoking status: Never    Smokeless 
left lower extremity hyperemia and shin area with focal swelling.     Skin: Skin color, texture, turgor normal. no acute rash or lesions   Lymph nodes: Cervical and supraclavicular normal   Musculoskeletal: Has lower extremity weakness, able to move toes but unable to move against resistance   Lines/Devices:  Intact, no erythema, drainage or tenderness   Psych: Alert and oriented, normal mood affect given the setting       Impression:   54 years old morbidly obese  male who is known to have history of paroxysmal atrial fibrillation, DVT but lately not on any anticoagulation, GERD and hypertension.  He has been bedbound for last 2 years because of his bilateral leg problems and weakness.  He was brought to the hospital because of increasing symptoms of cough congestion and shortness of breath.  He reported that recently his household picked up fluid infection initially he was feeling congested with cough however his symptoms improved a little bit but last night symptoms significantly got worse with increasing shortness of breath.  He had to call EMS.  In ER he was found to be hypoxic and was placed on nasal cannula oxygen.  His flu test was positive.  His WBC count was 25,000 on admission.  Chest x-ray revealed mild pulm edema  Patient initially had low blood pressure and was given albumin    Plan:   #1 acute hypoxic respiratory failure  Secondary to inflammation from influenza A.  He also is morbidly obese which is could be additional cause for his hypoxia.  Currently on 2 L of nasal cannula oxygen.  #2. influenza infection  Started on Tamiflu  Blood cultures were done.  Patient empirically getting started on antibiotics as well  Agree with choice of antibiotics which include Rocephin and IV Zithromax.  3.  Pulmonary edema  Patient has evidence of interstitial edema on chest x-ray.  2D echocardiogram  Elevated BNP level  #4. acute kidney injury  Serum creatinine 2.06  Gentle IV hydration  #5 paroxysmal

## 2025-02-17 NOTE — CARE COORDINATION
Chart reviewed, DCP remains for patient to d/c from [facility] to home with mother once medically stable however patient failed Dionnes, awaiting PT/OT evals to determine further DCP needs.

## 2025-02-17 NOTE — CARE COORDINATION
CM met with patient to discuss DCP, patient agreeable to HH, choice given for [facility] , referral sent via [facility], awaiting possible acceptance.

## 2025-02-18 ENCOUNTER — APPOINTMENT (OUTPATIENT)
Facility: HOSPITAL | Age: 55
DRG: 871 | End: 2025-02-18
Payer: MEDICARE

## 2025-02-18 LAB
25(OH)D3 SERPL-MCNC: <9 NG/ML (ref 30–100)
ALBUMIN SERPL-MCNC: 2.5 G/DL (ref 3.5–5)
ANION GAP SERPL CALC-SCNC: 7 MMOL/L (ref 2–12)
BUN SERPL-MCNC: 30 MG/DL (ref 6–20)
BUN/CREAT SERPL: 17 (ref 12–20)
CA-I BLD-MCNC: 8 MG/DL (ref 8.5–10.1)
CA-I BLD-MCNC: 8.3 MG/DL (ref 8.5–10.1)
CHLORIDE SERPL-SCNC: 104 MMOL/L (ref 97–108)
CHLORIDE UR-SCNC: <10 MMOL/L
CK SERPL-CCNC: 24 U/L (ref 39–308)
CO2 SERPL-SCNC: 27 MMOL/L (ref 21–32)
CREAT SERPL-MCNC: 1.78 MG/DL (ref 0.7–1.3)
CREAT UR-MCNC: 255 MG/DL
EKG ATRIAL RATE: 105 BPM
EKG DIAGNOSIS: NORMAL
EKG P AXIS: 64 DEGREES
EKG P-R INTERVAL: 154 MS
EKG Q-T INTERVAL: 338 MS
EKG QRS DURATION: 82 MS
EKG QTC CALCULATION (BAZETT): 446 MS
EKG R AXIS: 23 DEGREES
EKG T AXIS: 29 DEGREES
EKG VENTRICULAR RATE: 105 BPM
GLUCOSE BLD STRIP.AUTO-MCNC: 143 MG/DL (ref 65–100)
GLUCOSE BLD STRIP.AUTO-MCNC: 145 MG/DL (ref 65–100)
GLUCOSE BLD STRIP.AUTO-MCNC: 157 MG/DL (ref 65–100)
GLUCOSE BLD STRIP.AUTO-MCNC: 161 MG/DL (ref 65–100)
GLUCOSE SERPL-MCNC: 138 MG/DL (ref 65–100)
PERFORMED BY:: ABNORMAL
PHOSPHATE SERPL-MCNC: 2.3 MG/DL (ref 2.6–4.7)
POTASSIUM SERPL-SCNC: 3.2 MMOL/L (ref 3.5–5.1)
PROCALCITONIN SERPL-MCNC: 8.85 NG/ML
PROT UR-MCNC: 38 MG/DL (ref 0–11.9)
PROT/CREAT UR-RTO: 0.1
PTH-INTACT SERPL-MCNC: 79.2 PG/ML (ref 18.4–88)
SODIUM SERPL-SCNC: 138 MMOL/L (ref 136–145)
SODIUM UR-SCNC: 8 MMOL/L

## 2025-02-18 PROCEDURE — 6370000000 HC RX 637 (ALT 250 FOR IP)

## 2025-02-18 PROCEDURE — 6360000002 HC RX W HCPCS: Performed by: PHYSICIAN ASSISTANT

## 2025-02-18 PROCEDURE — 83970 ASSAY OF PARATHORMONE: CPT

## 2025-02-18 PROCEDURE — 82962 GLUCOSE BLOOD TEST: CPT

## 2025-02-18 PROCEDURE — 82570 ASSAY OF URINE CREATININE: CPT

## 2025-02-18 PROCEDURE — 94640 AIRWAY INHALATION TREATMENT: CPT

## 2025-02-18 PROCEDURE — 71045 X-RAY EXAM CHEST 1 VIEW: CPT

## 2025-02-18 PROCEDURE — 80069 RENAL FUNCTION PANEL: CPT

## 2025-02-18 PROCEDURE — 6370000000 HC RX 637 (ALT 250 FOR IP): Performed by: STUDENT IN AN ORGANIZED HEALTH CARE EDUCATION/TRAINING PROGRAM

## 2025-02-18 PROCEDURE — 2500000003 HC RX 250 WO HCPCS

## 2025-02-18 PROCEDURE — 2580000003 HC RX 258

## 2025-02-18 PROCEDURE — 2580000003 HC RX 258: Performed by: STUDENT IN AN ORGANIZED HEALTH CARE EDUCATION/TRAINING PROGRAM

## 2025-02-18 PROCEDURE — 84156 ASSAY OF PROTEIN URINE: CPT

## 2025-02-18 PROCEDURE — 6360000002 HC RX W HCPCS: Performed by: STUDENT IN AN ORGANIZED HEALTH CARE EDUCATION/TRAINING PROGRAM

## 2025-02-18 PROCEDURE — 84300 ASSAY OF URINE SODIUM: CPT

## 2025-02-18 PROCEDURE — 6370000000 HC RX 637 (ALT 250 FOR IP): Performed by: PHYSICIAN ASSISTANT

## 2025-02-18 PROCEDURE — 36415 COLL VENOUS BLD VENIPUNCTURE: CPT

## 2025-02-18 PROCEDURE — 84145 PROCALCITONIN (PCT): CPT

## 2025-02-18 PROCEDURE — 82436 ASSAY OF URINE CHLORIDE: CPT

## 2025-02-18 PROCEDURE — 2500000003 HC RX 250 WO HCPCS: Performed by: STUDENT IN AN ORGANIZED HEALTH CARE EDUCATION/TRAINING PROGRAM

## 2025-02-18 PROCEDURE — 2060000000 HC ICU INTERMEDIATE R&B

## 2025-02-18 PROCEDURE — 6370000000 HC RX 637 (ALT 250 FOR IP): Performed by: INTERNAL MEDICINE

## 2025-02-18 PROCEDURE — 82306 VITAMIN D 25 HYDROXY: CPT

## 2025-02-18 PROCEDURE — 82550 ASSAY OF CK (CPK): CPT

## 2025-02-18 PROCEDURE — P9047 ALBUMIN (HUMAN), 25%, 50ML: HCPCS | Performed by: PHYSICIAN ASSISTANT

## 2025-02-18 RX ORDER — POTASSIUM CHLORIDE 1500 MG/1
40 TABLET, EXTENDED RELEASE ORAL EVERY 4 HOURS
Status: COMPLETED | OUTPATIENT
Start: 2025-02-18 | End: 2025-02-18

## 2025-02-18 RX ORDER — SODIUM CHLORIDE 9 MG/ML
INJECTION, SOLUTION INTRAVENOUS CONTINUOUS
Status: DISCONTINUED | OUTPATIENT
Start: 2025-02-18 | End: 2025-02-19

## 2025-02-18 RX ORDER — TRAMADOL HYDROCHLORIDE 50 MG/1
50 TABLET ORAL EVERY 6 HOURS PRN
Status: DISCONTINUED | OUTPATIENT
Start: 2025-02-18 | End: 2025-02-20 | Stop reason: HOSPADM

## 2025-02-18 RX ADMIN — IPRATROPIUM BROMIDE AND ALBUTEROL SULFATE 1 DOSE: 2.5; .5 SOLUTION RESPIRATORY (INHALATION) at 20:05

## 2025-02-18 RX ADMIN — GUAIFENESIN 600 MG: 600 TABLET ORAL at 21:50

## 2025-02-18 RX ADMIN — MICONAZOLE NITRATE: 20 POWDER TOPICAL at 10:16

## 2025-02-18 RX ADMIN — PANTOPRAZOLE SODIUM 40 MG: 40 TABLET, DELAYED RELEASE ORAL at 06:05

## 2025-02-18 RX ADMIN — CEFTRIAXONE SODIUM 1000 MG: 1 INJECTION, POWDER, FOR SOLUTION INTRAMUSCULAR; INTRAVENOUS at 04:45

## 2025-02-18 RX ADMIN — MIDODRINE HYDROCHLORIDE 10 MG: 5 TABLET ORAL at 17:54

## 2025-02-18 RX ADMIN — POTASSIUM CHLORIDE 40 MEQ: 1500 TABLET, EXTENDED RELEASE ORAL at 13:17

## 2025-02-18 RX ADMIN — OSELTAMIVIR PHOSPHATE 75 MG: 75 CAPSULE ORAL at 21:56

## 2025-02-18 RX ADMIN — SODIUM CHLORIDE, PRESERVATIVE FREE 10 ML: 5 INJECTION INTRAVENOUS at 21:51

## 2025-02-18 RX ADMIN — SODIUM CHLORIDE: 9 INJECTION, SOLUTION INTRAVENOUS at 15:51

## 2025-02-18 RX ADMIN — ENOXAPARIN SODIUM 60 MG: 100 INJECTION SUBCUTANEOUS at 21:49

## 2025-02-18 RX ADMIN — ALBUMIN (HUMAN) 25 G: 0.25 INJECTION, SOLUTION INTRAVENOUS at 06:16

## 2025-02-18 RX ADMIN — AZITHROMYCIN MONOHYDRATE 500 MG: 500 INJECTION, POWDER, LYOPHILIZED, FOR SOLUTION INTRAVENOUS at 17:54

## 2025-02-18 RX ADMIN — MIDODRINE HYDROCHLORIDE 10 MG: 5 TABLET ORAL at 13:17

## 2025-02-18 RX ADMIN — ACETAMINOPHEN 650 MG: 325 TABLET ORAL at 10:06

## 2025-02-18 RX ADMIN — TRAMADOL HYDROCHLORIDE 50 MG: 50 TABLET, COATED ORAL at 17:54

## 2025-02-18 RX ADMIN — TRAMADOL HYDROCHLORIDE 50 MG: 50 TABLET, COATED ORAL at 23:16

## 2025-02-18 RX ADMIN — GUAIFENESIN 600 MG: 600 TABLET ORAL at 10:05

## 2025-02-18 RX ADMIN — MICONAZOLE NITRATE: 20 POWDER TOPICAL at 21:50

## 2025-02-18 RX ADMIN — MIDODRINE HYDROCHLORIDE 10 MG: 5 TABLET ORAL at 10:05

## 2025-02-18 RX ADMIN — SODIUM CHLORIDE, PRESERVATIVE FREE 10 ML: 5 INJECTION INTRAVENOUS at 04:49

## 2025-02-18 RX ADMIN — SODIUM CHLORIDE: 9 INJECTION, SOLUTION INTRAVENOUS at 02:53

## 2025-02-18 RX ADMIN — ACETAMINOPHEN 650 MG: 325 TABLET ORAL at 04:19

## 2025-02-18 RX ADMIN — DIBASIC SODIUM PHOSPHATE, MONOBASIC POTASSIUM PHOSPHATE AND MONOBASIC SODIUM PHOSPHATE 2 TABLET: 852; 155; 130 TABLET ORAL at 15:57

## 2025-02-18 RX ADMIN — ENOXAPARIN SODIUM 60 MG: 100 INJECTION SUBCUTANEOUS at 10:06

## 2025-02-18 RX ADMIN — POTASSIUM CHLORIDE 40 MEQ: 1500 TABLET, EXTENDED RELEASE ORAL at 15:58

## 2025-02-18 RX ADMIN — OSELTAMIVIR PHOSPHATE 75 MG: 75 CAPSULE ORAL at 10:05

## 2025-02-18 ASSESSMENT — PAIN DESCRIPTION - ORIENTATION
ORIENTATION: INNER;OUTER
ORIENTATION: LEFT
ORIENTATION: LEFT

## 2025-02-18 ASSESSMENT — PAIN SCALES - GENERAL
PAINLEVEL_OUTOF10: 7
PAINLEVEL_OUTOF10: 4
PAINLEVEL_OUTOF10: 6
PAINLEVEL_OUTOF10: 4
PAINLEVEL_OUTOF10: 0
PAINLEVEL_OUTOF10: 7

## 2025-02-18 ASSESSMENT — PAIN DESCRIPTION - LOCATION
LOCATION: RIB CAGE
LOCATION: GENERALIZED
LOCATION: OTHER (COMMENT)
LOCATION: RIB CAGE

## 2025-02-18 ASSESSMENT — PAIN - FUNCTIONAL ASSESSMENT
PAIN_FUNCTIONAL_ASSESSMENT: ACTIVITIES ARE NOT PREVENTED
PAIN_FUNCTIONAL_ASSESSMENT: ACTIVITIES ARE NOT PREVENTED
PAIN_FUNCTIONAL_ASSESSMENT: PREVENTS OR INTERFERES SOME ACTIVE ACTIVITIES AND ADLS
PAIN_FUNCTIONAL_ASSESSMENT: PREVENTS OR INTERFERES SOME ACTIVE ACTIVITIES AND ADLS

## 2025-02-18 ASSESSMENT — ENCOUNTER SYMPTOMS
ABDOMINAL PAIN: 1
VOMITING: 0
BACK PAIN: 0
WHEEZING: 1
SHORTNESS OF BREATH: 1
COUGH: 0
NAUSEA: 0

## 2025-02-18 ASSESSMENT — PAIN DESCRIPTION - DESCRIPTORS
DESCRIPTORS: SHARP
DESCRIPTORS: ACHING;BURNING;DISCOMFORT
DESCRIPTORS: ACHING;BURNING;DISCOMFORT
DESCRIPTORS: SHARP

## 2025-02-18 NOTE — WOUND CARE
IP WOUND CONSULT    Faisal Baird Jr.  MEDICAL RECORD NUMBER:  [id number]  AGE: 54 y.o.   GENDER: male  : 1970  TODAY'S DATE:  2025    GENERAL     [] Follow-up   [x] New Consult    Faisal Baird Jr. is a 54 y.o. male referred by:   [x] Physician  [] Nursing  [] Other:         PAST MEDICAL HISTORY    Past Medical History:   Diagnosis Date    Arrhythmia     afib, dx 2022    Cancer (HCC)     cancer of the penis, removed about 3 years    DVT (deep venous thrombosis) (HCC)     GERD (gastroesophageal reflux disease)     Hypertension 2020    Morbid obesity         PAST SURGICAL HISTORY    Past Surgical History:   Procedure Laterality Date    ORTHOPEDIC SURGERY      Ankle    ORTHOPEDIC SURGERY      Tumors removed from left knee     UROLOGICAL SURGERY      Penile surgery       FAMILY HISTORY    Family History   Problem Relation Age of Onset    Lung Disease Father     Hypertension Mother          ALLERGIES    No Known Allergies    MEDICATIONS    No current facility-administered medications on file prior to encounter.     No current outpatient medications on file prior to encounter.          /63   Pulse 76   Temp 97.5 °F (36.4 °C) (Oral)   Resp 18   Ht 1.753 m (5' 9.02\")   Wt (!) 240.9 kg (531 lb 1.4 oz)   SpO2 98%   BMI 78.39 kg/m²     Lab Results   Component Value Date/Time    WBC 23.9 (H) 2025 04:52 AM    LABA1C 5.7 (H) 2025 02:44 AM    POCGLU 143 (H) 2025 08:20 AM    POCGLU 163 (H) 2025 10:26 PM    HGB 11.0 (L) 2025 04:52 AM    HCT 32.6 (L) 2025 04:52 AM     2025 04:52 AM     ADULT DIET; Regular         ASSESSMENT     Wound Identification & Type: MASD to gluteal cleft  Dressing change: zinc paste and sacral foam  Verbal consent for picture: Yes    Contributing Factors: lymphedema, decreased mobility, shear force, obesity, incontinence of stool, and incontinence of urine    Wound 25 Buttocks Inner (Active)   Wound Etiology Other

## 2025-02-18 NOTE — CARE COORDINATION
[facility] no longer services patient's address, CM notified patient, he is agreeable to other HH being sent.    Patient accepted with [facility].    CM continues to follow and monitor for needs.

## 2025-02-19 LAB
ALBUMIN SERPL-MCNC: 2.8 G/DL (ref 3.5–5)
ALBUMIN/GLOB SERPL: 0.6 (ref 1.1–2.2)
ALP SERPL-CCNC: 110 U/L (ref 45–117)
ALT SERPL-CCNC: 28 U/L (ref 12–78)
ANION GAP SERPL CALC-SCNC: 1 MMOL/L (ref 2–12)
AST SERPL W P-5'-P-CCNC: 35 U/L (ref 15–37)
BASOPHILS # BLD: 0.03 K/UL (ref 0–0.1)
BASOPHILS NFR BLD: 0.4 % (ref 0–1)
BILIRUB SERPL-MCNC: 1.2 MG/DL (ref 0.2–1)
BUN SERPL-MCNC: 20 MG/DL (ref 6–20)
BUN/CREAT SERPL: 17 (ref 12–20)
CA-I BLD-MCNC: 8.5 MG/DL (ref 8.5–10.1)
CHLORIDE SERPL-SCNC: 107 MMOL/L (ref 97–108)
CO2 SERPL-SCNC: 30 MMOL/L (ref 21–32)
CREAT SERPL-MCNC: 1.16 MG/DL (ref 0.7–1.3)
DIFFERENTIAL METHOD BLD: ABNORMAL
EOSINOPHIL # BLD: 0.04 K/UL (ref 0–0.4)
EOSINOPHIL NFR BLD: 0.5 % (ref 0–7)
ERYTHROCYTE [DISTWIDTH] IN BLOOD BY AUTOMATED COUNT: 14.1 % (ref 11.5–14.5)
GLOBULIN SER CALC-MCNC: 4.5 G/DL (ref 2–4)
GLUCOSE BLD STRIP.AUTO-MCNC: 124 MG/DL (ref 65–100)
GLUCOSE BLD STRIP.AUTO-MCNC: 140 MG/DL (ref 65–100)
GLUCOSE BLD STRIP.AUTO-MCNC: 144 MG/DL (ref 65–100)
GLUCOSE BLD STRIP.AUTO-MCNC: 146 MG/DL (ref 65–100)
GLUCOSE SERPL-MCNC: 132 MG/DL (ref 65–100)
HCT VFR BLD AUTO: 32.6 % (ref 36.6–50.3)
HGB BLD-MCNC: 10.2 G/DL (ref 12.1–17)
IMM GRANULOCYTES # BLD AUTO: 0.05 K/UL (ref 0–0.04)
IMM GRANULOCYTES NFR BLD AUTO: 0.6 % (ref 0–0.5)
LYMPHOCYTES # BLD: 1.48 K/UL (ref 0.8–3.5)
LYMPHOCYTES NFR BLD: 17.4 % (ref 12–49)
MAGNESIUM SERPL-MCNC: 2.2 MG/DL (ref 1.6–2.4)
MCH RBC QN AUTO: 29.7 PG (ref 26–34)
MCHC RBC AUTO-ENTMCNC: 31.3 G/DL (ref 30–36.5)
MCV RBC AUTO: 95 FL (ref 80–99)
MONOCYTES # BLD: 0.59 K/UL (ref 0–1)
MONOCYTES NFR BLD: 6.9 % (ref 5–13)
NEUTS SEG # BLD: 6.34 K/UL (ref 1.8–8)
NEUTS SEG NFR BLD: 74.2 % (ref 32–75)
NRBC # BLD: 0 K/UL (ref 0–0.01)
NRBC BLD-RTO: 0 PER 100 WBC
PERFORMED BY:: ABNORMAL
PHOSPHATE SERPL-MCNC: 2.7 MG/DL (ref 2.6–4.7)
PLATELET # BLD AUTO: 230 K/UL (ref 150–400)
PMV BLD AUTO: 9.8 FL (ref 8.9–12.9)
POTASSIUM SERPL-SCNC: 4.2 MMOL/L (ref 3.5–5.1)
PROCALCITONIN SERPL-MCNC: 4.16 NG/ML
PROT SERPL-MCNC: 7.3 G/DL (ref 6.4–8.2)
RBC # BLD AUTO: 3.43 M/UL (ref 4.1–5.7)
SODIUM SERPL-SCNC: 138 MMOL/L (ref 136–145)
WBC # BLD AUTO: 8.5 K/UL (ref 4.1–11.1)

## 2025-02-19 PROCEDURE — 2580000003 HC RX 258

## 2025-02-19 PROCEDURE — 84145 PROCALCITONIN (PCT): CPT

## 2025-02-19 PROCEDURE — 2060000000 HC ICU INTERMEDIATE R&B

## 2025-02-19 PROCEDURE — 6370000000 HC RX 637 (ALT 250 FOR IP): Performed by: INTERNAL MEDICINE

## 2025-02-19 PROCEDURE — 80053 COMPREHEN METABOLIC PANEL: CPT

## 2025-02-19 PROCEDURE — 94761 N-INVAS EAR/PLS OXIMETRY MLT: CPT

## 2025-02-19 PROCEDURE — 83735 ASSAY OF MAGNESIUM: CPT

## 2025-02-19 PROCEDURE — 2500000003 HC RX 250 WO HCPCS

## 2025-02-19 PROCEDURE — 2580000003 HC RX 258: Performed by: STUDENT IN AN ORGANIZED HEALTH CARE EDUCATION/TRAINING PROGRAM

## 2025-02-19 PROCEDURE — 6370000000 HC RX 637 (ALT 250 FOR IP): Performed by: PHYSICIAN ASSISTANT

## 2025-02-19 PROCEDURE — 82962 GLUCOSE BLOOD TEST: CPT

## 2025-02-19 PROCEDURE — 36415 COLL VENOUS BLD VENIPUNCTURE: CPT

## 2025-02-19 PROCEDURE — 2500000003 HC RX 250 WO HCPCS: Performed by: STUDENT IN AN ORGANIZED HEALTH CARE EDUCATION/TRAINING PROGRAM

## 2025-02-19 PROCEDURE — 6370000000 HC RX 637 (ALT 250 FOR IP)

## 2025-02-19 PROCEDURE — 84100 ASSAY OF PHOSPHORUS: CPT

## 2025-02-19 PROCEDURE — 85025 COMPLETE CBC W/AUTO DIFF WBC: CPT

## 2025-02-19 PROCEDURE — 6360000002 HC RX W HCPCS: Performed by: STUDENT IN AN ORGANIZED HEALTH CARE EDUCATION/TRAINING PROGRAM

## 2025-02-19 PROCEDURE — 94640 AIRWAY INHALATION TREATMENT: CPT

## 2025-02-19 PROCEDURE — 6370000000 HC RX 637 (ALT 250 FOR IP): Performed by: STUDENT IN AN ORGANIZED HEALTH CARE EDUCATION/TRAINING PROGRAM

## 2025-02-19 PROCEDURE — 2700000000 HC OXYGEN THERAPY PER DAY

## 2025-02-19 RX ORDER — DEXTROMETHORPHAN POLISTIREX 30 MG/5ML
60 SUSPENSION ORAL EVERY 12 HOURS SCHEDULED
Status: DISCONTINUED | OUTPATIENT
Start: 2025-02-19 | End: 2025-02-20 | Stop reason: HOSPADM

## 2025-02-19 RX ORDER — ERGOCALCIFEROL 1.25 MG/1
50000 CAPSULE, LIQUID FILLED ORAL WEEKLY
Status: DISCONTINUED | OUTPATIENT
Start: 2025-02-19 | End: 2025-02-20 | Stop reason: HOSPADM

## 2025-02-19 RX ORDER — MIDODRINE HYDROCHLORIDE 5 MG/1
10 TABLET ORAL 3 TIMES DAILY PRN
Status: DISCONTINUED | OUTPATIENT
Start: 2025-02-19 | End: 2025-02-20 | Stop reason: HOSPADM

## 2025-02-19 RX ADMIN — SODIUM CHLORIDE, PRESERVATIVE FREE 10 ML: 5 INJECTION INTRAVENOUS at 10:08

## 2025-02-19 RX ADMIN — Medication 60 MG: at 10:07

## 2025-02-19 RX ADMIN — Medication 60 MG: at 22:16

## 2025-02-19 RX ADMIN — IPRATROPIUM BROMIDE AND ALBUTEROL SULFATE 1 DOSE: 2.5; .5 SOLUTION RESPIRATORY (INHALATION) at 20:13

## 2025-02-19 RX ADMIN — OSELTAMIVIR PHOSPHATE 75 MG: 75 CAPSULE ORAL at 10:09

## 2025-02-19 RX ADMIN — GUAIFENESIN 600 MG: 600 TABLET ORAL at 10:07

## 2025-02-19 RX ADMIN — AZITHROMYCIN MONOHYDRATE 500 MG: 500 INJECTION, POWDER, LYOPHILIZED, FOR SOLUTION INTRAVENOUS at 18:15

## 2025-02-19 RX ADMIN — MIDODRINE HYDROCHLORIDE 10 MG: 5 TABLET ORAL at 13:21

## 2025-02-19 RX ADMIN — ACETAMINOPHEN 650 MG: 325 TABLET ORAL at 02:28

## 2025-02-19 RX ADMIN — MIDODRINE HYDROCHLORIDE 10 MG: 5 TABLET ORAL at 10:07

## 2025-02-19 RX ADMIN — IPRATROPIUM BROMIDE AND ALBUTEROL SULFATE 1 DOSE: 2.5; .5 SOLUTION RESPIRATORY (INHALATION) at 06:42

## 2025-02-19 RX ADMIN — ENOXAPARIN SODIUM 60 MG: 100 INJECTION SUBCUTANEOUS at 22:16

## 2025-02-19 RX ADMIN — SODIUM CHLORIDE, PRESERVATIVE FREE 10 ML: 5 INJECTION INTRAVENOUS at 22:19

## 2025-02-19 RX ADMIN — PANTOPRAZOLE SODIUM 40 MG: 40 TABLET, DELAYED RELEASE ORAL at 05:24

## 2025-02-19 RX ADMIN — ERGOCALCIFEROL 50000 UNITS: 1.25 CAPSULE ORAL at 18:24

## 2025-02-19 RX ADMIN — GUAIFENESIN 600 MG: 600 TABLET ORAL at 22:17

## 2025-02-19 RX ADMIN — SODIUM CHLORIDE, PRESERVATIVE FREE 10 ML: 5 INJECTION INTRAVENOUS at 22:20

## 2025-02-19 RX ADMIN — SODIUM CHLORIDE: 0.9 INJECTION, SOLUTION INTRAVENOUS at 10:30

## 2025-02-19 RX ADMIN — ENOXAPARIN SODIUM 60 MG: 100 INJECTION SUBCUTANEOUS at 10:08

## 2025-02-19 RX ADMIN — SODIUM CHLORIDE, PRESERVATIVE FREE 10 ML: 5 INJECTION INTRAVENOUS at 10:09

## 2025-02-19 RX ADMIN — MICONAZOLE NITRATE: 20 POWDER TOPICAL at 22:19

## 2025-02-19 RX ADMIN — CEFTRIAXONE SODIUM 1000 MG: 1 INJECTION, POWDER, FOR SOLUTION INTRAMUSCULAR; INTRAVENOUS at 05:24

## 2025-02-19 RX ADMIN — OSELTAMIVIR PHOSPHATE 75 MG: 75 CAPSULE ORAL at 22:16

## 2025-02-19 ASSESSMENT — PAIN SCALES - GENERAL
PAINLEVEL_OUTOF10: 4
PAINLEVEL_OUTOF10: 0
PAINLEVEL_OUTOF10: 0

## 2025-02-19 ASSESSMENT — ENCOUNTER SYMPTOMS
EYE PAIN: 0
COUGH: 1
BACK PAIN: 0
NAUSEA: 0
SHORTNESS OF BREATH: 0
ABDOMINAL PAIN: 0
DIARRHEA: 0
VOMITING: 0

## 2025-02-19 ASSESSMENT — PAIN DESCRIPTION - ORIENTATION: ORIENTATION: LEFT

## 2025-02-19 ASSESSMENT — PAIN - FUNCTIONAL ASSESSMENT: PAIN_FUNCTIONAL_ASSESSMENT: ACTIVITIES ARE NOT PREVENTED

## 2025-02-19 ASSESSMENT — PAIN DESCRIPTION - LOCATION: LOCATION: RIB CAGE

## 2025-02-19 ASSESSMENT — PAIN DESCRIPTION - DESCRIPTORS: DESCRIPTORS: SHARP

## 2025-02-19 NOTE — CARE COORDINATION
Chart reviewed, Patient accepted with [facility], clinical updates sent via [facility].     CM continues to follow and monitor for needs

## 2025-02-20 VITALS
HEART RATE: 82 BPM | RESPIRATION RATE: 18 BRPM | WEIGHT: 315 LBS | DIASTOLIC BLOOD PRESSURE: 61 MMHG | TEMPERATURE: 97.9 F | HEIGHT: 69 IN | SYSTOLIC BLOOD PRESSURE: 118 MMHG | BODY MASS INDEX: 46.65 KG/M2 | OXYGEN SATURATION: 95 %

## 2025-02-20 PROBLEM — R79.89 ELEVATED D-DIMER: Status: RESOLVED | Noted: 2025-02-20 | Resolved: 2025-02-20

## 2025-02-20 PROBLEM — A41.9 SEPSIS (HCC): Status: ACTIVE | Noted: 2025-02-20

## 2025-02-20 PROBLEM — J96.01 ACUTE HYPOXIC RESPIRATORY FAILURE (HCC): Status: RESOLVED | Noted: 2025-02-16 | Resolved: 2025-02-20

## 2025-02-20 PROBLEM — R79.89 ELEVATED D-DIMER: Status: ACTIVE | Noted: 2025-02-20

## 2025-02-20 PROBLEM — R74.01 TRANSAMINITIS: Status: RESOLVED | Noted: 2025-02-20 | Resolved: 2025-02-20

## 2025-02-20 PROBLEM — J81.1 PULMONARY EDEMA: Status: ACTIVE | Noted: 2025-02-20

## 2025-02-20 PROBLEM — R74.01 TRANSAMINITIS: Status: ACTIVE | Noted: 2025-02-20

## 2025-02-20 PROBLEM — I87.2 VENOUS INSUFFICIENCY: Status: ACTIVE | Noted: 2025-02-20

## 2025-02-20 PROBLEM — A41.9 SEPSIS (HCC): Status: RESOLVED | Noted: 2025-02-20 | Resolved: 2025-02-20

## 2025-02-20 PROBLEM — J18.9 PNEUMONIA: Status: RESOLVED | Noted: 2025-02-20 | Resolved: 2025-02-20

## 2025-02-20 PROBLEM — I95.9 HYPOTENSION: Status: RESOLVED | Noted: 2025-02-20 | Resolved: 2025-02-20

## 2025-02-20 PROBLEM — N17.9 AKI (ACUTE KIDNEY INJURY): Status: ACTIVE | Noted: 2022-08-18

## 2025-02-20 PROBLEM — N17.9 AKI (ACUTE KIDNEY INJURY): Status: RESOLVED | Noted: 2022-08-18 | Resolved: 2025-02-20

## 2025-02-20 PROBLEM — E66.01 MORBID OBESITY WITH BODY MASS INDEX (BMI) GREATER THAN OR EQUAL TO 70 IN ADULT: Status: ACTIVE | Noted: 2025-02-20

## 2025-02-20 PROBLEM — J81.1 PULMONARY EDEMA: Status: RESOLVED | Noted: 2025-02-20 | Resolved: 2025-02-20

## 2025-02-20 PROBLEM — J18.9 PNEUMONIA: Status: ACTIVE | Noted: 2025-02-20

## 2025-02-20 PROBLEM — E83.42 HYPOMAGNESEMIA: Status: ACTIVE | Noted: 2025-02-20

## 2025-02-20 PROBLEM — J10.1 INFLUENZA A: Status: ACTIVE | Noted: 2025-02-20

## 2025-02-20 PROBLEM — E83.42 HYPOMAGNESEMIA: Status: RESOLVED | Noted: 2025-02-20 | Resolved: 2025-02-20

## 2025-02-20 PROBLEM — M94.0 COSTOCHONDRITIS: Status: ACTIVE | Noted: 2025-02-20

## 2025-02-20 PROBLEM — I95.9 HYPOTENSION: Status: ACTIVE | Noted: 2025-02-20

## 2025-02-20 LAB
ALBUMIN SERPL-MCNC: 2.6 G/DL (ref 3.5–5)
ANION GAP SERPL CALC-SCNC: 2 MMOL/L (ref 2–12)
BASOPHILS # BLD: 0.03 K/UL (ref 0–0.1)
BASOPHILS NFR BLD: 0.4 % (ref 0–1)
BNP SERPL-MCNC: 2374 PG/ML
BUN SERPL-MCNC: 13 MG/DL (ref 6–20)
BUN/CREAT SERPL: 14 (ref 12–20)
CA-I BLD-MCNC: 7.8 MG/DL (ref 8.5–10.1)
CHLORIDE SERPL-SCNC: 107 MMOL/L (ref 97–108)
CO2 SERPL-SCNC: 29 MMOL/L (ref 21–32)
CREAT SERPL-MCNC: 0.91 MG/DL (ref 0.7–1.3)
DIFFERENTIAL METHOD BLD: ABNORMAL
EOSINOPHIL # BLD: 0.05 K/UL (ref 0–0.4)
EOSINOPHIL NFR BLD: 0.7 % (ref 0–7)
ERYTHROCYTE [DISTWIDTH] IN BLOOD BY AUTOMATED COUNT: 14.3 % (ref 11.5–14.5)
GLUCOSE BLD STRIP.AUTO-MCNC: 137 MG/DL (ref 65–100)
GLUCOSE BLD STRIP.AUTO-MCNC: 165 MG/DL (ref 65–100)
GLUCOSE SERPL-MCNC: 132 MG/DL (ref 65–100)
HCT VFR BLD AUTO: 34.8 % (ref 36.6–50.3)
HGB BLD-MCNC: 10.7 G/DL (ref 12.1–17)
IMM GRANULOCYTES # BLD AUTO: 0.05 K/UL (ref 0–0.04)
IMM GRANULOCYTES NFR BLD AUTO: 0.7 % (ref 0–0.5)
LYMPHOCYTES # BLD: 1.67 K/UL (ref 0.8–3.5)
LYMPHOCYTES NFR BLD: 23.3 % (ref 12–49)
MCH RBC QN AUTO: 29.8 PG (ref 26–34)
MCHC RBC AUTO-ENTMCNC: 30.7 G/DL (ref 30–36.5)
MCV RBC AUTO: 96.9 FL (ref 80–99)
MONOCYTES # BLD: 0.73 K/UL (ref 0–1)
MONOCYTES NFR BLD: 10.2 % (ref 5–13)
NEUTS SEG # BLD: 4.63 K/UL (ref 1.8–8)
NEUTS SEG NFR BLD: 64.7 % (ref 32–75)
NRBC # BLD: 0 K/UL (ref 0–0.01)
NRBC BLD-RTO: 0 PER 100 WBC
PERFORMED BY:: ABNORMAL
PERFORMED BY:: ABNORMAL
PHOSPHATE SERPL-MCNC: 2.4 MG/DL (ref 2.6–4.7)
PLATELET # BLD AUTO: 243 K/UL (ref 150–400)
PMV BLD AUTO: 9.6 FL (ref 8.9–12.9)
POTASSIUM SERPL-SCNC: 3.8 MMOL/L (ref 3.5–5.1)
PROCALCITONIN SERPL-MCNC: 1.67 NG/ML
RBC # BLD AUTO: 3.59 M/UL (ref 4.1–5.7)
SODIUM SERPL-SCNC: 138 MMOL/L (ref 136–145)
WBC # BLD AUTO: 7.2 K/UL (ref 4.1–11.1)

## 2025-02-20 PROCEDURE — 85025 COMPLETE CBC W/AUTO DIFF WBC: CPT

## 2025-02-20 PROCEDURE — 80069 RENAL FUNCTION PANEL: CPT

## 2025-02-20 PROCEDURE — 6370000000 HC RX 637 (ALT 250 FOR IP): Performed by: STUDENT IN AN ORGANIZED HEALTH CARE EDUCATION/TRAINING PROGRAM

## 2025-02-20 PROCEDURE — 83880 ASSAY OF NATRIURETIC PEPTIDE: CPT

## 2025-02-20 PROCEDURE — 84145 PROCALCITONIN (PCT): CPT

## 2025-02-20 PROCEDURE — 2500000003 HC RX 250 WO HCPCS: Performed by: STUDENT IN AN ORGANIZED HEALTH CARE EDUCATION/TRAINING PROGRAM

## 2025-02-20 PROCEDURE — 6370000000 HC RX 637 (ALT 250 FOR IP): Performed by: PHYSICIAN ASSISTANT

## 2025-02-20 PROCEDURE — 97530 THERAPEUTIC ACTIVITIES: CPT

## 2025-02-20 PROCEDURE — 2500000003 HC RX 250 WO HCPCS

## 2025-02-20 PROCEDURE — 82962 GLUCOSE BLOOD TEST: CPT

## 2025-02-20 PROCEDURE — 6360000002 HC RX W HCPCS: Performed by: STUDENT IN AN ORGANIZED HEALTH CARE EDUCATION/TRAINING PROGRAM

## 2025-02-20 PROCEDURE — 36415 COLL VENOUS BLD VENIPUNCTURE: CPT

## 2025-02-20 RX ORDER — LIDOCAINE 4 G/G
1 PATCH TOPICAL DAILY
Qty: 3 EACH | Refills: 0 | Status: SHIPPED | OUTPATIENT
Start: 2025-02-21 | End: 2025-02-24

## 2025-02-20 RX ORDER — IPRATROPIUM BROMIDE AND ALBUTEROL SULFATE 2.5; .5 MG/3ML; MG/3ML
3 SOLUTION RESPIRATORY (INHALATION)
Qty: 42 ML | Refills: 0 | Status: SHIPPED | OUTPATIENT
Start: 2025-02-20 | End: 2025-02-27

## 2025-02-20 RX ORDER — GUAIFENESIN 600 MG/1
600 TABLET, EXTENDED RELEASE ORAL 2 TIMES DAILY
Qty: 10 TABLET | Refills: 0 | Status: SHIPPED | OUTPATIENT
Start: 2025-02-20 | End: 2025-02-25

## 2025-02-20 RX ORDER — ERGOCALCIFEROL 1.25 MG/1
50000 CAPSULE ORAL WEEKLY
Qty: 5 CAPSULE | Refills: 0 | Status: SHIPPED | OUTPATIENT
Start: 2025-02-26 | End: 2025-04-10

## 2025-02-20 RX ORDER — DEXTROMETHORPHAN POLISTIREX 30 MG/5ML
60 SUSPENSION ORAL EVERY 12 HOURS SCHEDULED
Qty: 100 ML | Refills: 0 | Status: SHIPPED | OUTPATIENT
Start: 2025-02-20 | End: 2025-02-25

## 2025-02-20 RX ADMIN — ENOXAPARIN SODIUM 60 MG: 100 INJECTION SUBCUTANEOUS at 08:47

## 2025-02-20 RX ADMIN — ACETAMINOPHEN 650 MG: 325 TABLET ORAL at 09:21

## 2025-02-20 RX ADMIN — OSELTAMIVIR PHOSPHATE 75 MG: 75 CAPSULE ORAL at 08:47

## 2025-02-20 RX ADMIN — GUAIFENESIN 600 MG: 600 TABLET ORAL at 08:47

## 2025-02-20 RX ADMIN — Medication 60 MG: at 08:46

## 2025-02-20 RX ADMIN — SODIUM CHLORIDE, PRESERVATIVE FREE 10 ML: 5 INJECTION INTRAVENOUS at 09:22

## 2025-02-20 RX ADMIN — SODIUM CHLORIDE, PRESERVATIVE FREE 10 ML: 5 INJECTION INTRAVENOUS at 09:23

## 2025-02-20 RX ADMIN — CEFTRIAXONE SODIUM 1000 MG: 1 INJECTION, POWDER, FOR SOLUTION INTRAMUSCULAR; INTRAVENOUS at 05:27

## 2025-02-20 RX ADMIN — PANTOPRAZOLE SODIUM 40 MG: 40 TABLET, DELAYED RELEASE ORAL at 05:25

## 2025-02-20 ASSESSMENT — PAIN DESCRIPTION - ORIENTATION: ORIENTATION: MID

## 2025-02-20 ASSESSMENT — PAIN SCALES - GENERAL
PAINLEVEL_OUTOF10: 3
PAINLEVEL_OUTOF10: 0
PAINLEVEL_OUTOF10: 2

## 2025-02-20 ASSESSMENT — PAIN DESCRIPTION - PAIN TYPE: TYPE: ACUTE PAIN

## 2025-02-20 ASSESSMENT — PAIN DESCRIPTION - FREQUENCY: FREQUENCY: INTERMITTENT

## 2025-02-20 ASSESSMENT — PAIN DESCRIPTION - DESCRIPTORS: DESCRIPTORS: ACHING

## 2025-02-20 ASSESSMENT — PAIN DESCRIPTION - ONSET: ONSET: GRADUAL

## 2025-02-20 ASSESSMENT — PAIN - FUNCTIONAL ASSESSMENT: PAIN_FUNCTIONAL_ASSESSMENT: ACTIVITIES ARE NOT PREVENTED

## 2025-02-20 ASSESSMENT — PAIN DESCRIPTION - LOCATION: LOCATION: HEAD

## 2025-02-20 ASSESSMENT — PAIN SCALES - WONG BAKER: WONGBAKER_NUMERICALRESPONSE: NO HURT

## 2025-02-20 NOTE — DISCHARGE SUMMARY
Discharge Summary    Name: Faisal Baird Jr.  [id number]  YOB: 1970 (Age: 54 y.o.)   Date of Admission: 2/16/2025  Date of Discharge: 2/20/2025  Attending Physician: Mirella Corley MD    Discharge Diagnosis:   Principal Problem (Resolved):    Acute hypoxic respiratory failure (HCC)  Active Problems:    Influenza A    Morbid obesity with body mass index (BMI) greater than or equal to 70 in adult    Venous insufficiency    Costochondritis  Resolved Problems:    SIMIN (acute kidney injury)    Sepsis (HCC)    Pneumonia    Pulmonary edema    Hypotension    Hypomagnesemia    Transaminitis    Elevated d-dimer       Consultations:  IP CONSULT TO PULMONOLOGY  IP CONSULT TO NEPHROLOGY    Brief Hospital Course by Main Problems:   Faisal Baird is a 54 y.o.  male with PMHx significant for paroxysmal A-fib not on anticoagulation, DVT, GERD, hypertension and morbid obesity who presented to emergency department with worsening shortness of breath.  Patient reported for the last 2 weeks he has been experiencing generalized fatigue, myalgias, nonproductive cough and chills.  However, he stated symptoms improved with symptomatic management, but beginning last night all symptoms returned and continued to progress which increased concern for further evaluation.  In ED labwork was obtained.  Creatinine 2.06.  Mag 1.4.  Troponin negative.  Bilirubin 3.2.  WBC 25.8.  Influenza positive.  CXR revealed mild pulmonary edema.  Blood cultures obtained and patient was initiated on IV Rocephin and doxycycline.  BNP 1577. lactic acid significantly elevated at 6.8, D-dimer 1.19, VQ scan ordered.  Rapid response called 2/16 for hypotension.  Sepsis bolus ordered, given 1 L as patient's CXR with evidence of pulmonary edema.  Echo revealed EF 60-65%, septal thickening normal diastolic and systolic function.  Pro-Ricardo, WBC improving.  Nephrology following for SIMIN that resolved during course of

## 2025-02-20 NOTE — PLAN OF CARE
Problem: Discharge Planning  Goal: Discharge to home or other facility with appropriate resources  2/18/2025 0024 by Garett Sow RN  Outcome: Progressing  2/17/2025 1538 by Stacey Zurita RN  Outcome: Progressing     Problem: Pain  Goal: Verbalizes/displays adequate comfort level or baseline comfort level  2/18/2025 0024 by Garett Sow RN  Outcome: Progressing  2/17/2025 1538 by Stacey Zurita RN  Outcome: Progressing     Problem: Skin/Tissue Integrity  Goal: Skin integrity remains intact  Description: 1.  Monitor for areas of redness and/or skin breakdown  2.  Assess vascular access sites hourly  3.  Every 4-6 hours minimum:  Change oxygen saturation probe site  4.  Every 4-6 hours:  If on nasal continuous positive airway pressure, respiratory therapy assess nares and determine need for appliance change or resting period  2/18/2025 0024 by Garett Sow RN  Outcome: Progressing  2/17/2025 1538 by Stacey Zurita RN  Outcome: Progressing     Problem: Safety - Adult  Goal: Free from fall injury  2/18/2025 0024 by Garett Sow RN  Outcome: Progressing  2/17/2025 1538 by Stacey Zurita RN  Outcome: Progressing     Problem: Physical Therapy - Adult  Goal: By Discharge: Performs mobility at highest level of function for planned discharge setting.  See evaluation for individualized goals.  Description: FUNCTIONAL STATUS PRIOR TO ADMISSION: The patient was functional at the wheelchair level and required moderate assistancefor transfers to the chair.    HOME SUPPORT PRIOR TO ADMISSION: The patient lived with family and required maximum assistance for ADLs.    Physical Therapy Goals  Initiated 2/17/2025  Pt stated goal: to continue to work with [employer]  Pt will be I with [facility] in 7 days.  Pt will perform bed mobility with [facility] in 7 days.  2/17/2025 1029 by Nicole Agarwal PT  Outcome: Progressing     Problem: 
  Problem: Discharge Planning  Goal: Discharge to home or other facility with appropriate resources  2/19/2025 0135 by Garett Sow RN  Outcome: Progressing  2/18/2025 1716 by Jazzy Mireles RN  Outcome: Progressing     Problem: Pain  Goal: Verbalizes/displays adequate comfort level or baseline comfort level  2/19/2025 0135 by Garett Sow RN  Outcome: Progressing  2/18/2025 1716 by Jazzy Mireles RN  Outcome: Progressing     Problem: Skin/Tissue Integrity  Goal: Skin integrity remains intact  Description: 1.  Monitor for areas of redness and/or skin breakdown  2.  Assess vascular access sites hourly  3.  Every 4-6 hours minimum:  Change oxygen saturation probe site  4.  Every 4-6 hours:  If on nasal continuous positive airway pressure, respiratory therapy assess nares and determine need for appliance change or resting period  2/19/2025 0135 by Garett Sow RN  Outcome: Progressing  2/18/2025 1716 by Jazzy Mireles RN  Outcome: Progressing     Problem: Safety - Adult  Goal: Free from fall injury  2/19/2025 0135 by Garett Sow RN  Outcome: Progressing  2/18/2025 1716 by Jazzy Mireles RN  Outcome: Progressing     
  Problem: Discharge Planning  Goal: Discharge to home or other facility with appropriate resources  2/19/2025 1127 by Stacey Zurita, RN  Outcome: Progressing  2/19/2025 0135 by Garett Sow RN  Outcome: Progressing     Problem: Pain  Goal: Verbalizes/displays adequate comfort level or baseline comfort level  2/19/2025 1127 by Stacey Zurita, RN  Outcome: Progressing  2/19/2025 0135 by Garett Sow RN  Outcome: Progressing     Problem: Skin/Tissue Integrity  Goal: Skin integrity remains intact  Description: 1.  Monitor for areas of redness and/or skin breakdown  2.  Assess vascular access sites hourly  3.  Every 4-6 hours minimum:  Change oxygen saturation probe site  4.  Every 4-6 hours:  If on nasal continuous positive airway pressure, respiratory therapy assess nares and determine need for appliance change or resting period  2/19/2025 1127 by Stacey Zurita, RN  Outcome: Progressing  2/19/2025 0135 by Garett Sow RN  Outcome: Progressing     Problem: Safety - Adult  Goal: Free from fall injury  2/19/2025 1127 by Stacey Zurita, RN  Outcome: Progressing  2/19/2025 0135 by Garett Sow RN  Outcome: Progressing     
  Problem: Discharge Planning  Goal: Discharge to home or other facility with appropriate resources  2/19/2025 2309 by Garett Sow RN  Outcome: Progressing  2/19/2025 1127 by Stacey Zurita RN  Outcome: Progressing     Problem: Pain  Goal: Verbalizes/displays adequate comfort level or baseline comfort level  2/19/2025 2309 by Garett Sow, BALJEET  Outcome: Progressing  2/19/2025 1127 by Stacey Zurita, RN  Outcome: Progressing     Problem: Skin/Tissue Integrity  Goal: Skin integrity remains intact  Description: 1.  Monitor for areas of redness and/or skin breakdown  2.  Assess vascular access sites hourly  3.  Every 4-6 hours minimum:  Change oxygen saturation probe site  4.  Every 4-6 hours:  If on nasal continuous positive airway pressure, respiratory therapy assess nares and determine need for appliance change or resting period  2/19/2025 2309 by Garett Sow RN  Outcome: Progressing  2/19/2025 1127 by Stacey Zurita, RN  Outcome: Progressing     Problem: Safety - Adult  Goal: Free from fall injury  2/19/2025 2309 by Garett Sow RN  Outcome: Progressing  2/19/2025 1127 by Stacey Zurita, RN  Outcome: Progressing     
  Problem: Discharge Planning  Goal: Discharge to home or other facility with appropriate resources  2/20/2025 1158 by Stcaey Zurita, RN  Outcome: Progressing  2/19/2025 2309 by Garett Sow RN  Outcome: Progressing     Problem: Pain  Goal: Verbalizes/displays adequate comfort level or baseline comfort level  2/20/2025 1158 by Stacey Zurita, RN  Outcome: Progressing  2/19/2025 2309 by Garett Sow RN  Outcome: Progressing     Problem: Skin/Tissue Integrity  Goal: Skin integrity remains intact  Description: 1.  Monitor for areas of redness and/or skin breakdown  2.  Assess vascular access sites hourly  3.  Every 4-6 hours minimum:  Change oxygen saturation probe site  4.  Every 4-6 hours:  If on nasal continuous positive airway pressure, respiratory therapy assess nares and determine need for appliance change or resting period  2/20/2025 1158 by Stacey Zurita, RN  Outcome: Progressing  2/19/2025 2309 by Garett Sow RN  Outcome: Progressing     Problem: Safety - Adult  Goal: Free from fall injury  2/20/2025 1158 by Stacey Zurita, RN  Outcome: Progressing  2/19/2025 2309 by Garett Sow RN  Outcome: Progressing     
  Problem: Discharge Planning  Goal: Discharge to home or other facility with appropriate resources  Outcome: Progressing     Problem: Pain  Goal: Verbalizes/displays adequate comfort level or baseline comfort level  Outcome: Progressing     Problem: Skin/Tissue Integrity  Goal: Skin integrity remains intact  Description: 1.  Monitor for areas of redness and/or skin breakdown  2.  Assess vascular access sites hourly  3.  Every 4-6 hours minimum:  Change oxygen saturation probe site  4.  Every 4-6 hours:  If on nasal continuous positive airway pressure, respiratory therapy assess nares and determine need for appliance change or resting period  Outcome: Progressing     
  Problem: Discharge Planning  Goal: Discharge to home or other facility with appropriate resources  Outcome: Progressing     Problem: Pain  Goal: Verbalizes/displays adequate comfort level or baseline comfort level  Outcome: Progressing     Problem: Skin/Tissue Integrity  Goal: Skin integrity remains intact  Description: 1.  Monitor for areas of redness and/or skin breakdown  2.  Assess vascular access sites hourly  3.  Every 4-6 hours minimum:  Change oxygen saturation probe site  4.  Every 4-6 hours:  If on nasal continuous positive airway pressure, respiratory therapy assess nares and determine need for appliance change or resting period  Outcome: Progressing     Problem: Safety - Adult  Goal: Free from fall injury  Outcome: Progressing     
OCCUPATIONAL THERAPY EVALUATION  Patient: Faisal Baird Jr. (54 y.o. male)  Date: 2/17/2025  Primary Diagnosis: Acute pulmonary edema [J81.0]  Influenza A [J10.1]  Acute hypoxic respiratory failure [J96.01]       Precautions: Fall Risk, General Precautions, Isolation, Contact Precautions (Droplet Precautions)                Recommendations for nursing mobility: Frequent repositioning to prevent skin breakdown, Assist x1, and Assist x2    In place during session:Nasal Cannula 1L and EKG/telemetry   ASSESSMENT  Mr Baird is a 54 y.o. male was admitted 2/16/2025 due to SOB and currently being treated for Acute Pulmonary Edema. He was received semi-supine in bed upon arrival, A & O x4, and agreeable to OT evaluation.     Based on current observations, he presents with decreased  functional mobility, independence in ADLs (see below for objective details and assist levels).     Overall, he tolerated session with additional time and assistance of 2 this session. He currently needs assistance for the following: Bed Mobility, Upper and Lower Body Dressing, and Toileting.  He will benefit from continued skilled OT services to address current impairments and improve IND and safety with self cares and functional transfers/mobility. Current OT d/c recommendation Intermittent occupational therapy up to 2-3x/week in previous living setting once medically appropriate.     Start of Session   Heart Rate (BPM) 60's-70's     GOALS:    Problem: Occupational Therapy - Adult  Goal: By Discharge: Performs self-care activities at highest level of function for planned discharge setting.  See evaluation for individualized goals.  Description: FUNCTIONAL STATUS PRIOR TO ADMISSION:  Patient was not ambulatory using an electric W/C and was assisted with the following ADL's: Grooming, Bathing, Dressing, and Toileting.    HOME SUPPORT: Family    Occupational Therapy Goals:  Initiated 2/17/2025  Patient/Family stated goal: I want to get back home 
PHYSICAL THERAPY EVALUATION  Patient: Faisal Baird Jr. (54 y.o. male)  Date: 2/17/2025  Primary Diagnosis: Acute pulmonary edema [J81.0]  Influenza A [J10.1]  Acute hypoxic respiratory failure [J96.01]       Precautions: Fall Risk, Isolation, Contact Precautions, General Precautions                      Recommendations for nursing mobility: Frequent repositioning to prevent skin breakdown, Tenor for bed to chair transfers, and Assist x1    In place during session: EKG/telemetry     ASSESSMENT  Pt is a 54 y.o. male admitted on 2/16/2025 for worsening SOB; PMHx afib, DVT, GERD, HTN, morbid obsesity; pt currently being treated for acute hypoxic respiratory failure . Pt supine upon PT arrival, agreeable to evaluation. Pt A&O x 4.  Pt reports that he has been primarily bed bound x2 years however is able to sit EOB and complete lateral transfers to scooter with assistance.    Based on the objective data described below, the patient currently presents with impaired functional mobility, decreased independence in ADLs, impaired ability to perform high-level IADLs, decreased ROM, impaired strength, and decreased activity tolerance. (See below for objective details and assist levels).     Overall pt tolerated session well today with no c/o pain or SOB throughout session. Pt required use of bed rails and minAx1-2 for bed mobility. Pt reports that at baseline he can sit at EOB with assistance and completes lateral transfers to electric scooter with assistance, however declined to attempt sitting EOB at this time stating \"I can't do it on these beds\". Pt incontinent of bowel/bladder, completed roll L/R for pericare and replacement of chux. Pt demos BLE AROM generally decreased, however functional at bed level. Pt states he had been working with [facility] therapy until ~2wks ago and would like to continue working with them at discharge. Pt will benefit from continued skilled PT to address above deficits and return 
stated goal: I want to get back home with my family!  1.  Patient will perform upper body dressing with Minimal Assist, Additional Time, and Assist x1 with head of bed elevated within 7 day(s).  2.  Patient will perform lower body dressing with Minimal Assist, Additional Time, and Assist x1 with head of bed elevated and long-sitting within 7 day(s).  3.  Patient will perform grooming with Stand by Assist and Additional Time head of bed elevated and long-sitting within 7 day(s).  4.  Patient will perform all aspects of toileting with Moderate Assist, Additional Time, and Assist x1 while in bed using bedrail for support as needed within 7 day(s).  5.  Patient will participate in upper extremity therapeutic exercise/activities with Clarissa and Additional Time for 10 minutes within 7 day(s).    2/17/2025 1055 by Juan Omalley, OT  Outcome: Progressing  2/17/2025 1054 by Juan Omalley, OT  Outcome: Progressing

## 2025-02-20 NOTE — CARE COORDINATION
Patient clear to d/c from  to home with HH, [facility].    Transition of Care Plan:    RUR: 13%  Prior Level of Functioning: total  Disposition: HH  JM:   If SNF or IPR: Date FOC offered:   Date FOC received:   Accepting facility: [facility]  Date authorization started with reference number:   Date authorization received and expires:   Follow up appointments:   DME needed:   Transportation at discharge: ambulance; privately paid for  IM/IMM Medicare/ letter given: yes  Is patient a [place] and connected with [facility]?    If yes, was  transfer form completed and [facility] notified?   Caregiver Contact:   Discharge Caregiver contacted prior to discharge? Patient aware   Care Conference needed?   Barriers to discharge: n/a

## 2025-02-20 NOTE — PROGRESS NOTES
Hospitalist Progress Note    NAME:   Faisal Baird Jr.   : 1970   MRN: [id number]     Date/Time: 2025 1:16 PM  Patient PCP: Delonte Rowe DO    Estimated discharge date: 48 hrs  Barriers: Clinical improvement, wean O2, SIMIN improvement, nephrology and pulm clearance      Assessment / Plan:  Acute hypoxic respiratory failure requiring 3 L of supplemental oxygen via nasal cannula  Influenza A positive  Sepsis secondary to above  -Blood cultures NGTD  -Ordered sputum culture, pending  -Initiated patient on Tamiflu  -Continue IV Rocephin and azithromycin  -Pro-Ricardo, WBC continuing to improve  -DuoNebs as needed  -Wean supplemental oxygen as appropriate maintaining saturation above 92%  -Pulmonology following, appreciate  -Telemetry  -Trend lactic, significantly elevated at 6.99, now trending down, continue gentle IV fluids     SIMIN likely secondary to poor p.o. intake  -Creatinine now 1.78, baseline around 0.07  -BUN 30, EGFR 45  -Initiated gentle IV fluids  -Nephrology following, appreciate recs  -CK and retroperitoneal ultrasound WNL     Mild pulmonary edema  Hypotension  -CXR revealed above  -BNP mildly increased  -Echo with preserved EF of 66%, mildly reduced stock function, however limited study secondary to body habitus  -Continue albumin supplementation with 25G x 3 doses  -Continue midodrine 10 mg 3 times daily     Hypomagnesemia, resolved  -Mag now 1.8     Mild transaminitis with elevated bili, likely secondary to above  -Given patient's body habitus unsure how reliable right quadrant ultrasound would be  -Bilirubin decreasing from 3.2-2.6, recheck in a.m.     Paroxysmal A-fib, currently in sinus rhythm  -Patient not currently taking any current medications at this time     Morbid obesity  Hyperglycemia without history of DM  -A1c 5.7% and lipid panel WNL  -Initiate patient on low-dose insulin sign scale with Accu-Cheks    Elevated D-dimer  -Unable to obtain CTA secondary to renal 
      Hospitalist Progress Note    NAME:   Faisal Baird Jr.   : 1970   MRN: [id number]     Date/Time: 2025 10:25 AM  Patient PCP: Delonte Rowe,     Nonbillable notice colleague admitted past midnight.  Agree with assessment and plan.  D-dimer 1.19, relatively benign however given tachycardia and tachypnea, will obtain VQ scan.  Patient's renal function too poor for CTA.    Estimated discharge date:>48 hrs  Barriers: Clinical improvement, wean O2, SIMIN improvement, pulmonology consult, VQ scan      Assessment / Plan:  Acute hypoxic respiratory failure requiring 2 L of supplemental oxygen via nasal cannula  Influenza A positive  Sepsis secondary to above  -Blood cultures obtained  -Ordered sputum culture  -Initiate patient on Tamiflu  -Continue IV Rocephin and azithromycin  -DuoNebs as needed  -Wean supplemental oxygen as appropriate maintaining saturation above 92%  -Pulmonology consult  -Telemetry  -Trend lactic, significantly elevated at 6.99, now trending down     SIMIN likely secondary to poor p.o. intake  -Creatinine 2.06, baseline 0.7  -Initiate gentle IV fluids, but continue to monitor patient's respiratory status as a CXR revealed mild pulmonary edema  -If no improvement consider nephrology consult     Mild pulmonary edema  Hypotension  -CXR revealed above  -BNP 1577  -Check echo  -Will order albumin to assist with low BP.  Once BP improved would recommend initiating Lasix to assist with diuresis  -Consider cardiology consult if no improvement to BP and depending on echo finding     Hypomagnesemia  -Mag 1.4, replete and recheck in the morning pending     Mild transaminitis with elevated bili, likely secondary to above  -Given patient's body habitus unsure how reliable right quadrant ultrasound would be.  However, if no improvement with morning labs would consider ordering for further evaluation of biliary tree     Paroxysmal A-fib, currently in sinus rhythm  -Patient not currently taking any 
      Hospitalist Progress Note    NAME:   Faisal Baird Jr.   : 1970   MRN: [id number]     Date/Time: 2025 11:53 AM  Patient PCP: Delonte Rowe DO    Estimated discharge date: 24-48 hours  Barriers: Ween O2, Pulmonology clearance    Assessment / Plan:  Acute hypoxic respiratory failure requiring 3 L of supplemental oxygen via nasal cannula  Influenza A positive  Sepsis secondary to above  -Blood cultures NGTD  -Ordered sputum culture, pending  -Initiated patient on Tamiflu  -Continue IV Rocephin and azithromycin  -Pro-Ricardo, WBC continuing to improve  -DuoNebs as needed  -Wean supplemental oxygen as appropriate maintaining saturation above 92%  -Pulmonology following, appreciate  -Telemetry  -Trend lactic, significantly elevated at 6.99, now trending down, continue gentle IV fluids  -Ambulatory oxygen test tomorrow     SIMIN likely secondary to poor p.o. intake, resolved  -Creatinine now 1.16   -BUN 20, EGFR 75  -Nephrology following  -CK and retroperitoneal ultrasound WNL     Mild pulmonary edema  Hypotension  -CXR revealed above  -BNP mildly increased  -Echo with preserved EF of 66%, mildly reduced stock function, however limited study secondary to body habitus  -Continue albumin supplementation with 25G x 3 doses  -Continue midodrine 10 mg 3 times daily     Hypomagnesemia, resolved  -Mag now 2.2     Mild transaminitis with elevated bili, likely secondary to above  -Given patient's body habitus unsure how reliable right quadrant ultrasound would be  -Bilirubin decreasing from 2.6-1.2     Paroxysmal A-fib, currently in sinus rhythm  -Patient not currently taking any current medications at this time     Morbid obesity  Hyperglycemia without history of DM  -A1c 5.7% and lipid panel WNL  -Initiate patient on low-dose insulin sign scale with Accu-Cheks     Elevated D-dimer  -Unable to obtain CTA secondary to renal dysfunction, patient too large for VQ scan  -Wells score 3 points, moderate risk, 16.2% 
      Hospitalist Progress Note    NAME:   Faisal Baird Jr.   : 1970   MRN: [id number]     Date/Time: 2025 1:56 PM  Patient PCP: Delonte Rowe DO    Estimated discharge date:>48 hrs  Barriers: Clinical improvement, wean O2, SIMIN improvement, nephrology consult, pulmonology clearance      Assessment / Plan:  Acute hypoxic respiratory failure requiring 2 L of supplemental oxygen via nasal cannula  Influenza A positive  Sepsis secondary to above  -Blood cultures obtained  -Ordered sputum culture, pending  -Initiated patient on Tamiflu  -Continue IV Rocephin and azithromycin  -Pro-Ricardo, WBC continuing to improve  -DuoNebs as needed  -Wean supplemental oxygen as appropriate maintaining saturation above 92%  -Pulmonology following, appreciate  -Telemetry  -Trend lactic, significantly elevated at 6.99, now trending down, continue gentle IV     SIMIN likely secondary to poor p.o. intake  -Creatinine now worsening to 2.69, baseline 0.07  -BUN mildly worsened 25, EGFR 27  -Possibly secondary to episode of hypotension yesterday  -Initiated gentle IV fluids, but continue to monitor patient's respiratory status as a CXR revealed mild pulmonary edema  -Nephrology consult     Mild pulmonary edema  Hypotension  -CXR revealed above  -BNP 1577, now increased to 1938  -Echo with preserved EF of 66%, mildly reduced stock function, however limited study secondary to body habitus  -Continue albumin supplementation with 25G x 3 doses  -Continue midodrine 10 mg 3 times daily     Hypomagnesemia  -Mag 1.4, recheck pending     Mild transaminitis with elevated bili, likely secondary to above  -Given patient's body habitus unsure how reliable right quadrant ultrasound would be  -Bilirubin decreasing from 3.2-2.6     Paroxysmal A-fib, currently in sinus rhythm  -Patient not currently taking any current medications at this time     Morbid obesity  Hyperglycemia without history of DM  -A1c 5.7% and lipid panel WNL  -Initiate patient 
    Pulmonary Progress Note      NAME: Faisal Baird Jr.   :  1970  MRM:  [id number]    Date/Time: 2025  10:48 AM    From consult on 2025:  Mr. Faisal Baird is a 54 years old morbidly obese  male who is known to have history of paroxysmal atrial fibrillation, DVT but lately not on any anticoagulation, GERD and hypertension.  He has been bedbound for last 2 years because of his bilateral leg problems and weakness.  He was brought to the hospital because of increasing symptoms of cough congestion and shortness of breath.  He reported that recently his household picked up fluid infection initially he was feeling congested with cough however his symptoms improved a little bit but last night symptoms significantly got worse with increasing shortness of breath.  He had to call EMS.  In ER he was found to be hypoxic and was placed on nasal cannula oxygen.  His flu test was positive.  His WBC count was 25,000 on admission.  Chest x-ray revealed mild pulm edema.  He does not use any oxygen at home.  Not on any positive pressure breathing devices like CPAP at home.  He denies any tobacco or alcohol use  Subjective:     2025: Mr. Baird complains today of a sharp stabbing pain with inspiration in the LLL area which started last night.  It apparently comes and goes.  A lidocaine patch was applied. He is currently on 2 L oxygen and denies any shortness of breath. He has a cough producing very little mucous.    2025: Mr. Baird resting comfortably in bed today when seen. He reports pain in LLL area has been controlled after receiving pain medications. He is currently on 1 L oxygen and denies any shortness of breath. Previously he was on 2 L. He has a cough producing very little mucous.     2025: Mr. Baird is feeling well and looking bright today. He is currently on room air and seems comfortable. He was previously on 1 L. He denies any pain or shortness of breath. He has some cough 
    Pulmonary Progress Note      NAME: Faisal Baird Jr.   :  1970  MRM:  [id number]    Date/Time: 2025  8:55 AM    From consult on 2025:  Mr. Faisal Baird is a 54 years old morbidly obese  male who is known to have history of paroxysmal atrial fibrillation, DVT but lately not on any anticoagulation, GERD and hypertension.  He has been bedbound for last 2 years because of his bilateral leg problems and weakness.  He was brought to the hospital because of increasing symptoms of cough congestion and shortness of breath.  He reported that recently his household picked up fluid infection initially he was feeling congested with cough however his symptoms improved a little bit but last night symptoms significantly got worse with increasing shortness of breath.  He had to call EMS.  In ER he was found to be hypoxic and was placed on nasal cannula oxygen.  His flu test was positive.  His WBC count was 25,000 on admission.  Chest x-ray revealed mild pulm edema.  He does not use any oxygen at home.  Not on any positive pressure breathing devices like CPAP at home.  He denies any tobacco or alcohol use  Subjective:     2025: Mr. Baird is reports he feeling is better than he did yesterday. He is currently on 1 L oxygen and seems to be comfortable. He denies any chills or shortness of breath. He reports cough with scant sputum, one episode of reddish sputum this morning. He is bedridden.     2025: Mr. Baird complains today of a sharp stabbing pain with inspiration in the LLL area which started last night.  It apparently comes and goes.  A lidocaine patch was applied. He is currently on 2 L oxygen and denies any shortness of breath. He has a cough producing very little mucous.    2025: Mr. Baird resting comfortably in bed today when seen. He reports pain in LLL area has been controlled after receiving pain medications. He is currently on 1 L oxygen and denies any shortness of 
    Pulmonary Progress Note      NAME: Faisal Baird Jr.   :  1970  MRM:  [id number]    Date/Time: 2025  10:36 AM    From consult on 2025:  Mr. Faisal Baird is a 54 years old morbidly obese  male who is known to have history of paroxysmal atrial fibrillation, DVT but lately not on any anticoagulation, GERD and hypertension.  He has been bedbound for last 2 years because of his bilateral leg problems and weakness.  He was brought to the hospital because of increasing symptoms of cough congestion and shortness of breath.  He reported that recently his household picked up fluid infection initially he was feeling congested with cough however his symptoms improved a little bit but last night symptoms significantly got worse with increasing shortness of breath.  He had to call EMS.  In ER he was found to be hypoxic and was placed on nasal cannula oxygen.  His flu test was positive.  His WBC count was 25,000 on admission.  Chest x-ray revealed mild pulm edema.  He does not use any oxygen at home.  Not on any positive pressure breathing devices like CPAP at home.  He denies any tobacco or alcohol use  Subjective:     2025: Mr. Baird is reports he feeling is better than he did yesterday. He is currently on 1 L oxygen and seems to be comfortable. He denies any chills or shortness of breath. He reports cough with scant sputum, one episode of reddish sputum this morning. He is bedridden.     2025: Mr. Baird complains today of a sharp stabbing pain with inspiration in the LLL area which started last night.  It apparently comes and goes.  A lidocaine patch was applied. He is currently on 2 L oxygen and denies any shortness of breath. He has a cough producing very little mucous.    Past Medical History reviewed and unchanged from Admission History and Physical       Objective:     Physical Exam   General:  Alert, cooperative, morbidly obese on 2 L nasal cannula oxygen   Eyes:  Sclera 
   02/20/25 0904   Resting (Room Air)   SpO2 96   HR 87   After Walk   Does the Patient Qualify for Home O2 No   Does the Patient Need Portable Oxygen Tanks No     Pt bed bound at baseline. Unable to ambulate.  
  Pulmonary Progress Note      NAME: Faisal Baird Jr.   :  1970  MRM:  [id number]    Date/Time: 2025  12:16 PM    From consult on 2025:  Mr. Faisal Baird is a 54 years old morbidly obese  male who is known to have history of paroxysmal atrial fibrillation, DVT but lately not on any anticoagulation, GERD and hypertension.  He has been bedbound for last 2 years because of his bilateral leg problems and weakness.  He was brought to the hospital because of increasing symptoms of cough congestion and shortness of breath.  He reported that recently his household picked up fluid infection initially he was feeling congested with cough however his symptoms improved a little bit but last night symptoms significantly got worse with increasing shortness of breath.  He had to call EMS.  In ER he was found to be hypoxic and was placed on nasal cannula oxygen.  His flu test was positive.  His WBC count was 25,000 on admission.  Chest x-ray revealed mild pulm edema.  He does not use any oxygen at home.  Not on any positive pressure breathing devices like CPAP at home.  He denies any tobacco or alcohol use  Subjective:     2025: Mr. Baird is reports he feeling is better than he did yesterday. He is currently on 1 L oxygen and seems to be comfortable. He denies any chills or shortness of breath. He reports cough with scant sputum, one episode of reddish sputum this morning. He is bedridden.     Past Medical History reviewed and unchanged from Admission History and Physical       Objective:     Physical Exam   General:  Alert, cooperative, morbidly obese on 1 L nasal cannula oxygen   Eyes:  Sclera anicteric. Pupils equally round and reactive to light.   Mouth/Throat: Mucous membranes normal, oral pharynx clear   Neck: Supple   Lungs:   Decreased air entry at lung bases.     CV:  Regular rate and rhythm,no murmur, click, rub or gallop   Abdomen:   Soft, non-tender. bowel sounds normal. 
..4 Eyes Skin Assessment     NAME:  Faisal Baird Jr.  YOB: 1970  MEDICAL RECORD NUMBER:  [id number]    The patient is being assessed for  Admission    I agree that at least one RN has performed a thorough Head to Toe Skin Assessment on the patient. ALL assessment sites listed below have been assessed.      Areas assessed by both nurses:    Head, Face, Ears, Shoulders, Back, Chest, Arms, Elbows, Hands, Sacrum. Buttock, Coccyx, Ischium, Legs. Feet and Heels, and Under Medical Devices         Does the Patient have a Wound? No noted wound(s)       Zeke Prevention initiated by RN: Yes  Wound Care Orders initiated by RN: Yes    Pressure Injury (Stage 3,4, Unstageable, DTI, NWPT, and Complex wounds) if present, place Wound referral order by RN under : No    New Ostomies, if present place, Ostomy referral order under : No     Nurse 1 eSignature: Electronically signed by Juve Ybarra RN on 2/16/25 at 8:58 AM EST    **SHARE this note so that the co-signing nurse can place an eSignature**    Nurse 2 eSignature: Electronically signed by Treva Solis RN on 2/16/25 at 9:52 AM EST   
4 Eyes Skin Assessment     NAME:  Faisal Baird Jr.  YOB: 1970  MEDICAL RECORD NUMBER:  [id number]    The patient is being assessed for  Other Weekly skin assessment    I agree that at least one RN has performed a thorough Head to Toe Skin Assessment on the patient. ALL assessment sites listed below have been assessed.      Areas assessed by both nurses:    Head, Face, Ears, Shoulders, Back, Chest, Arms, Elbows, Hands, Sacrum. Buttock, Coccyx, Ischium, and Legs. Feet and Heels        Does the Patient have a Wound? Yes wound(s) were present on assessment. LDA wound assessment was Initiated and completed by RN     Inner buttocks, Redness to groin,   Zeke Prevention initiated by RN: Yes  Wound Care Orders initiated by RN: Yes    Pressure Injury (Stage 3,4, Unstageable, DTI, NWPT, and Complex wounds) if present, place Wound referral order by RN under : No    New Ostomies, if present place, Ostomy referral order under : No     Nurse 1 eSignature: Electronically signed by Garett voss RN on 2/19/25 at 4:20 AM EST    **SHARE this note so that the co-signing nurse can place an eSignature**    Nurse 2 eSignature: Electronically signed by Sabrina Huang RN on 2/19/25 at 6:43 AM EST   
Discharge paperwork complete. Print and send with patient when transport arrives. Primary nurse aware.  
OCCUPATIONAL THERAPY TREATMENT  Patient: Faisal Baird Jr. (54 y.o. male)  Date: 2/20/2025  Primary Diagnosis: Acute pulmonary edema (HCC) [J81.0]  Influenza A [J10.1]  Acute hypoxic respiratory failure (HCC) [J96.01]       Precautions: Fall Risk, General Precautions, Isolation, Contact Precautions (Droplet Precautions)                Recommendations for nursing mobility: Encourage HEP in prep for ADLs/mobility; see handout for details, Frequent repositioning to prevent skin breakdown, and Assist x1    In place during session: EKG/telemetry  and Pulse ox  Chart, occupational therapy assessment, plan of care, and goals were reviewed.  ASSESSMENT  Patient continues with skilled OT services and is progressing towards goals. Pt semi supine in bed upon MARCH arrival, agreeable to session. Pt A&O x 4. Pt given red thera band w/ hand out on exercises.Pt completed UE therex w/ red thera band, see grid below for details, to maintain/ increase strength and endurance to aid in adl performance. Pt completed light grooming w/ set up only. Education provided on energy conservation, safety awareness and HEP w/ pt verbalizing good understanding. Pt left semi supine in bed with all known needs met. (See below for objective details and assist levels).     Overall pt tolerated session well today with rest breaks.Pt on RA and had no SOB and remained in mid to high 90's. Pt pleasant and appears very motivated to increase functional level to return to PLOF.  Current OT recommendations for discharge Intermittent occupational therapy up to 2-3x/week in previous living setting. Will continue to benefit from skilled OT services, and will continue to progress as tolerated.      Start of Session End of Session   SPO2 (%)  98 on RA   Heart Rate (BPM)  115     GOALS:    Problem: Occupational Therapy - Adult  Goal: By Discharge: Performs self-care activities at highest level of function for planned discharge setting.  See evaluation for 
PT eval order received and acknowledged. PT eval attempted at 0905 however pt undergoing bedside EKG. Will continue to follow patient and attempt PT eval at a later time. Thank you.    
RT Inhaler-Nebulizer Bronchodilator Protocol Note    There is a bronchodilator order in the chart from a provider indicating to follow the RT Bronchodilator Protocol and there is an “Initiate RT Inhaler-Nebulizer Bronchodilator Protocol” order as well (see protocol at bottom of note).    CXR Findings:  No results found.    The findings from the last RT Protocol Assessment were as follows:   History Pulmonary Disease: (P) None or smoker <15 pack years  Respiratory Pattern: (P) Dyspnea on exertion or RR 21-25 bpm  Breath Sounds: (P) Slightly diminished and/or crackles  Cough: (P) Strong, productive  Indication for Bronchodilator Therapy:    Bronchodilator Assessment Score: (P) 5    Aerosolized bronchodilator medication orders have been revised according to the RT Inhaler-Nebulizer Bronchodilator Protocol below.    Respiratory Therapist to perform RT Therapy Protocol Assessment initially then follow the protocol.  Repeat RT Therapy Protocol Assessment PRN for score 0-3 or on second treatment, BID, and PRN for scores above 3.    No Indications - adjust the frequency to every 6 hours PRN wheezing or bronchospasm, if no treatments needed after 48 hours then discontinue using Per Protocol order mode.     If indication present, adjust the RT bronchodilator orders based on the Bronchodilator Assessment Score as indicated below.  Use Inhaler orders unless patient has one or more of the following: on home nebulizer, not able to hold breath for 10 seconds, is not alert and oriented, cannot activate and use MDI correctly, or respiratory rate 25 breaths per minute or more, then use the equivalent nebulizer order(s) with same Frequency and PRN reasons based on the score.  If a patient is on this medication at home then do not decrease Frequency below that used at home.    0-3 - enter or revise RT bronchodilator order(s) to equivalent RT Bronchodilator order with Frequency of every 4 hours PRN for wheezing or increased work of 
Received Order for Telemetry     Faisal [place] Bishop Lara   1970   [id number]   Acute hypoxic respiratory failure [J96.01]   Sukumar Milan MD     Tele Box # [id number] placed on patient at  0722 am  ER Room # 21  Admitting to Room 475  Transferring Nurse SAYDA  Verified with Primary Nurse NO CALL at  0722 am   
  Sodium, urine, random    Collection Time: 02/18/25  6:21 AM   Result Value Ref Range    SODIUM, RANDOM URINE 8 mmol/L   Chloride, Random Urine    Collection Time: 02/18/25  6:21 AM   Result Value Ref Range    Chloride <10 mmol/L   POCT Glucose    Collection Time: 02/18/25  8:20 AM   Result Value Ref Range    POC Glucose 143 (H) 65 - 100 mg/dL    Performed by: Miguel Hill    POCT Glucose    Collection Time: 02/18/25 11:48 AM   Result Value Ref Range    POC Glucose 157 (H) 65 - 100 mg/dL    Performed by: Miguel Hill         Assessment and Plan:     1. Acute Kidney Injury on ?CKD: probably prerenal azotemia secondary to hypotension in setting of Lasix and diarrhea  -Baseline creatinine unknown, last creatinine on record was 0.73 in 12/2022  -Presented with creatinine of 2.0 increasing to 2.6, improving to 1.7 likely in response to IV fluids  -Urinalysis to be collected, urine sodium and chloride indicative of prerenal pattern/volume depletion, minimal proteinuria  -No rhabdo  -Renal ultrasound limited by body habitus, no hydronephrosis  -Continue IV fluids for another 24 hours, monitor for respiratory distress  -Hold Lasix   -I/O's   -Corrected calcium level is okay; phosphorus 2.3, will replete and recheck tomorrow  -Will check iPTH and vitamin D levels, results pending  -Will continue to monitor renal functions and adjust management as needed    2. Hypotension:   -Blood pressures better today  -Continue midodrine  -Continue gentle IV fluids  -Not on any blood pressure medications  -Will continue to monitor blood pressures and adjust antihypertensive regimen as needed    4. Lower extremity edema: probably related to obesity/increased salt intake/hypoalbuminemia/immobility  -Chronic  -Hold diuretic for now  -Check I/Os, daily weights  -Venous dopplers show no e/o BLE DVT  -Will monitor fluid status clinically and adjust regimen as needed    5. Hypomagnesemia  -Mg was 1.4 on admission, improved to 1.8 with 
medications  -Will continue to monitor blood pressures and adjust antihypertensive regimen as needed    4. Lower extremity edema: probably related to obesity/increased salt intake/hypoalbuminemia/immobility  -Chronic  -Hold diuretic for now  -Check I/Os, daily weights  -Venous dopplers show no e/o BLE DVT  -Will monitor fluid status clinically and adjust regimen as needed    5. Hypomagnesemia  -Mg improved to 2.2   Hypokalemia  -Potassium is 4.2 after repletion  -Recheck K and mg in the morning     6. Anemia:   -Hb 10.2, stable, no indication for Epogen  -Continue to monitor H&H     7. Shortness of breath:  -Multifactorial including hypoventilation secondary to obesity, infection  -Influenza A positive, on Tamiflu, on DuoNebs  -Elevated WBCs (resolved), elevated procal (improving)  -On Zithromax and Rocephin  -Repeat CXR shows now pulm edema  -Echo shows preserved EF, normal diastolic function    Signed By: HILARIA Ruano - CNP     February 19, 2025        Addendum:  Rounds made along with Bella Snyder NP  Patient seen and examined at bedside,    Labs and treatments reviewed   Plan discussed with patient and NP   Reviewed NP's  notes, amended and agree with assessment and plan    Dr. Rayshawn Kowalski

## 2025-02-22 LAB
BACTERIA SPEC CULT: NORMAL
BACTERIA SPEC CULT: NORMAL
Lab: NORMAL
Lab: NORMAL

## 2025-03-22 PROBLEM — J10.1 INFLUENZA A: Status: RESOLVED | Noted: 2025-02-20 | Resolved: 2025-03-22

## 2025-06-27 ENCOUNTER — APPOINTMENT (OUTPATIENT)
Facility: HOSPITAL | Age: 55
End: 2025-06-27
Payer: MEDICARE

## 2025-06-27 ENCOUNTER — HOSPITAL ENCOUNTER (OUTPATIENT)
Facility: HOSPITAL | Age: 55
Setting detail: OBSERVATION
Discharge: HOME | End: 2025-06-28
Attending: EMERGENCY MEDICINE | Admitting: INTERNAL MEDICINE
Payer: MEDICARE

## 2025-06-27 DIAGNOSIS — I47.10 PAROXYSMAL SUPRAVENTRICULAR TACHYCARDIA: Primary | ICD-10-CM

## 2025-06-27 LAB
BASOPHILS # BLD: 0.06 K/UL (ref 0–0.1)
BASOPHILS NFR BLD: 0.6 % (ref 0–1)
DIFFERENTIAL METHOD BLD: NORMAL
EOSINOPHIL # BLD: 0.12 K/UL (ref 0–0.4)
EOSINOPHIL NFR BLD: 1.2 % (ref 0–7)
ERYTHROCYTE [DISTWIDTH] IN BLOOD BY AUTOMATED COUNT: 13.6 % (ref 11.5–14.5)
HCT VFR BLD AUTO: 40 % (ref 36.6–50.3)
HGB BLD-MCNC: 13.3 G/DL (ref 12.1–17)
IMM GRANULOCYTES # BLD AUTO: 0.04 K/UL (ref 0–0.04)
IMM GRANULOCYTES NFR BLD AUTO: 0.4 % (ref 0–0.5)
LYMPHOCYTES # BLD: 2.41 K/UL (ref 0.8–3.5)
LYMPHOCYTES NFR BLD: 24.2 % (ref 12–49)
MCH RBC QN AUTO: 29.8 PG (ref 26–34)
MCHC RBC AUTO-ENTMCNC: 33.3 G/DL (ref 30–36.5)
MCV RBC AUTO: 89.5 FL (ref 80–99)
MONOCYTES # BLD: 0.67 K/UL (ref 0–1)
MONOCYTES NFR BLD: 6.7 % (ref 5–13)
NEUTS SEG # BLD: 6.65 K/UL (ref 1.8–8)
NEUTS SEG NFR BLD: 66.9 % (ref 32–75)
NRBC # BLD: 0 K/UL (ref 0–0.01)
NRBC BLD-RTO: 0 PER 100 WBC
PLATELET # BLD AUTO: 279 K/UL (ref 150–400)
PMV BLD AUTO: 8.9 FL (ref 8.9–12.9)
RBC # BLD AUTO: 4.47 M/UL (ref 4.1–5.7)
WBC # BLD AUTO: 10 K/UL (ref 4.1–11.1)

## 2025-06-27 PROCEDURE — 85025 COMPLETE CBC W/AUTO DIFF WBC: CPT

## 2025-06-27 PROCEDURE — 84484 ASSAY OF TROPONIN QUANT: CPT

## 2025-06-27 PROCEDURE — 71045 X-RAY EXAM CHEST 1 VIEW: CPT

## 2025-06-27 PROCEDURE — 83880 ASSAY OF NATRIURETIC PEPTIDE: CPT

## 2025-06-27 PROCEDURE — 83036 HEMOGLOBIN GLYCOSYLATED A1C: CPT

## 2025-06-27 PROCEDURE — 84443 ASSAY THYROID STIM HORMONE: CPT

## 2025-06-27 PROCEDURE — 93005 ELECTROCARDIOGRAM TRACING: CPT | Performed by: EMERGENCY MEDICINE

## 2025-06-27 PROCEDURE — 80053 COMPREHEN METABOLIC PANEL: CPT

## 2025-06-27 PROCEDURE — 99285 EMERGENCY DEPT VISIT HI MDM: CPT

## 2025-06-27 PROCEDURE — 36415 COLL VENOUS BLD VENIPUNCTURE: CPT

## 2025-06-27 PROCEDURE — 83735 ASSAY OF MAGNESIUM: CPT

## 2025-06-27 ASSESSMENT — PAIN SCALES - GENERAL: PAINLEVEL_OUTOF10: 0

## 2025-06-27 ASSESSMENT — LIFESTYLE VARIABLES
HOW MANY STANDARD DRINKS CONTAINING ALCOHOL DO YOU HAVE ON A TYPICAL DAY: PATIENT DOES NOT DRINK
HOW OFTEN DO YOU HAVE A DRINK CONTAINING ALCOHOL: NEVER

## 2025-06-27 ASSESSMENT — PAIN DESCRIPTION - LOCATION: LOCATION: CHEST

## 2025-06-28 VITALS
SYSTOLIC BLOOD PRESSURE: 131 MMHG | DIASTOLIC BLOOD PRESSURE: 88 MMHG | RESPIRATION RATE: 24 BRPM | BODY MASS INDEX: 42.66 KG/M2 | HEART RATE: 76 BPM | WEIGHT: 315 LBS | TEMPERATURE: 98.2 F | OXYGEN SATURATION: 97 % | HEIGHT: 72 IN

## 2025-06-28 PROBLEM — I47.10 SVT (SUPRAVENTRICULAR TACHYCARDIA): Status: ACTIVE | Noted: 2025-06-28

## 2025-06-28 LAB
ALBUMIN SERPL-MCNC: 3 G/DL (ref 3.5–5)
ALBUMIN/GLOB SERPL: 0.6 (ref 1.1–2.2)
ALP SERPL-CCNC: 201 U/L (ref 45–117)
ALT SERPL-CCNC: 29 U/L (ref 12–78)
ANION GAP SERPL CALC-SCNC: 7 MMOL/L (ref 2–12)
AST SERPL W P-5'-P-CCNC: 37 U/L (ref 15–37)
BILIRUB SERPL-MCNC: 0.8 MG/DL (ref 0.2–1)
BNP SERPL-MCNC: 99 PG/ML
BUN SERPL-MCNC: 13 MG/DL (ref 6–20)
BUN/CREAT SERPL: 12 (ref 12–20)
CA-I BLD-MCNC: 8.5 MG/DL (ref 8.5–10.1)
CHLORIDE SERPL-SCNC: 104 MMOL/L (ref 97–108)
CO2 SERPL-SCNC: 27 MMOL/L (ref 21–32)
CREAT SERPL-MCNC: 1.05 MG/DL (ref 0.7–1.3)
EST. AVERAGE GLUCOSE BLD GHB EST-MCNC: 103 MG/DL
EST. AVERAGE GLUCOSE BLD GHB EST-MCNC: 97 MG/DL
GLOBULIN SER CALC-MCNC: 5 G/DL (ref 2–4)
GLUCOSE SERPL-MCNC: 222 MG/DL (ref 65–100)
HBA1C MFR BLD: 5 % (ref 4–5.6)
HBA1C MFR BLD: 5.2 % (ref 4–5.6)
MAGNESIUM SERPL-MCNC: 1.8 MG/DL (ref 1.6–2.4)
POTASSIUM SERPL-SCNC: 4.4 MMOL/L (ref 3.5–5.1)
PROT SERPL-MCNC: 8 G/DL (ref 6.4–8.2)
SODIUM SERPL-SCNC: 138 MMOL/L (ref 136–145)
T4 FREE SERPL-MCNC: 1.2 NG/DL (ref 0.8–1.5)
TROPONIN I SERPL HS-MCNC: 5 NG/L (ref 0–76)
TROPONIN I SERPL HS-MCNC: 9 NG/L (ref 0–76)
TSH SERPL DL<=0.05 MIU/L-ACNC: 5.28 UIU/ML (ref 0.36–3.74)

## 2025-06-28 PROCEDURE — 83036 HEMOGLOBIN GLYCOSYLATED A1C: CPT

## 2025-06-28 PROCEDURE — 84439 ASSAY OF FREE THYROXINE: CPT

## 2025-06-28 PROCEDURE — 84484 ASSAY OF TROPONIN QUANT: CPT

## 2025-06-28 PROCEDURE — 96372 THER/PROPH/DIAG INJ SC/IM: CPT

## 2025-06-28 PROCEDURE — G0378 HOSPITAL OBSERVATION PER HR: HCPCS

## 2025-06-28 PROCEDURE — 6370000000 HC RX 637 (ALT 250 FOR IP): Performed by: INTERNAL MEDICINE

## 2025-06-28 PROCEDURE — 36415 COLL VENOUS BLD VENIPUNCTURE: CPT

## 2025-06-28 PROCEDURE — 6360000002 HC RX W HCPCS: Performed by: INTERNAL MEDICINE

## 2025-06-28 RX ORDER — METOPROLOL TARTRATE 25 MG/1
25 TABLET, FILM COATED ORAL 2 TIMES DAILY
Status: DISCONTINUED | OUTPATIENT
Start: 2025-06-28 | End: 2025-06-28 | Stop reason: HOSPADM

## 2025-06-28 RX ORDER — POTASSIUM CHLORIDE 1500 MG/1
40 TABLET, EXTENDED RELEASE ORAL PRN
Status: DISCONTINUED | OUTPATIENT
Start: 2025-06-28 | End: 2025-06-28 | Stop reason: HOSPADM

## 2025-06-28 RX ORDER — POLYETHYLENE GLYCOL 3350 17 G/17G
17 POWDER, FOR SOLUTION ORAL DAILY PRN
Status: DISCONTINUED | OUTPATIENT
Start: 2025-06-28 | End: 2025-06-28 | Stop reason: HOSPADM

## 2025-06-28 RX ORDER — SODIUM CHLORIDE 0.9 % (FLUSH) 0.9 %
5-40 SYRINGE (ML) INJECTION PRN
Status: DISCONTINUED | OUTPATIENT
Start: 2025-06-28 | End: 2025-06-28 | Stop reason: HOSPADM

## 2025-06-28 RX ORDER — ENOXAPARIN SODIUM 100 MG/ML
60 INJECTION SUBCUTANEOUS 2 TIMES DAILY
Status: DISCONTINUED | OUTPATIENT
Start: 2025-06-28 | End: 2025-06-28

## 2025-06-28 RX ORDER — ACETAMINOPHEN 325 MG/1
650 TABLET ORAL EVERY 6 HOURS PRN
Status: DISCONTINUED | OUTPATIENT
Start: 2025-06-28 | End: 2025-06-28 | Stop reason: HOSPADM

## 2025-06-28 RX ORDER — POTASSIUM CHLORIDE 7.45 MG/ML
10 INJECTION INTRAVENOUS PRN
Status: DISCONTINUED | OUTPATIENT
Start: 2025-06-28 | End: 2025-06-28 | Stop reason: HOSPADM

## 2025-06-28 RX ORDER — ACETAMINOPHEN 650 MG/1
650 SUPPOSITORY RECTAL EVERY 6 HOURS PRN
Status: DISCONTINUED | OUTPATIENT
Start: 2025-06-28 | End: 2025-06-28 | Stop reason: HOSPADM

## 2025-06-28 RX ORDER — ONDANSETRON 4 MG/1
4 TABLET, ORALLY DISINTEGRATING ORAL EVERY 8 HOURS PRN
Status: DISCONTINUED | OUTPATIENT
Start: 2025-06-28 | End: 2025-06-28 | Stop reason: HOSPADM

## 2025-06-28 RX ORDER — MAGNESIUM SULFATE IN WATER 40 MG/ML
2000 INJECTION, SOLUTION INTRAVENOUS PRN
Status: DISCONTINUED | OUTPATIENT
Start: 2025-06-28 | End: 2025-06-28 | Stop reason: HOSPADM

## 2025-06-28 RX ORDER — SODIUM CHLORIDE 9 MG/ML
INJECTION, SOLUTION INTRAVENOUS PRN
Status: DISCONTINUED | OUTPATIENT
Start: 2025-06-28 | End: 2025-06-28 | Stop reason: HOSPADM

## 2025-06-28 RX ORDER — ONDANSETRON 2 MG/ML
4 INJECTION INTRAMUSCULAR; INTRAVENOUS EVERY 6 HOURS PRN
Status: DISCONTINUED | OUTPATIENT
Start: 2025-06-28 | End: 2025-06-28 | Stop reason: HOSPADM

## 2025-06-28 RX ORDER — SODIUM CHLORIDE 0.9 % (FLUSH) 0.9 %
5-40 SYRINGE (ML) INJECTION EVERY 12 HOURS SCHEDULED
Status: DISCONTINUED | OUTPATIENT
Start: 2025-06-28 | End: 2025-06-28 | Stop reason: HOSPADM

## 2025-06-28 RX ORDER — METOPROLOL TARTRATE 25 MG/1
25 TABLET, FILM COATED ORAL 2 TIMES DAILY
Qty: 90 TABLET | Refills: 3 | Status: SHIPPED | OUTPATIENT
Start: 2025-06-28

## 2025-06-28 RX ADMIN — METOPROLOL TARTRATE 25 MG: 25 TABLET, FILM COATED ORAL at 08:31

## 2025-06-28 RX ADMIN — METOPROLOL TARTRATE 25 MG: 25 TABLET, FILM COATED ORAL at 03:07

## 2025-06-28 RX ADMIN — ENOXAPARIN SODIUM 60 MG: 100 INJECTION SUBCUTANEOUS at 08:32

## 2025-06-28 ASSESSMENT — HEART SCORE: ECG: NORMAL

## 2025-06-28 NOTE — CONSULTS
CARDIOLOGY CONSULTATION    REASON FOR CONSULT: Afib, need to establish cardiac care    REQUESTING PROVIDER:     Ramakrishna Murillo MD       CHIEF COMPLAINT:  Chest pain, palpitations    HISTORY OF PRESENT ILLNESS:  Faisal Baird Jr. is a 54 y.o. year-old male with past medical history significant for PAF, HTN, DVT, morbid obesity who was evaluated today due to chest pain and palpitations. Pt reports he is bed bound due to old back injury and leg weakness. He was watching TV when he had a sudden onset of chest pain and palpitations with associated SOB and lightheadedness. EMS was called and reported SVT. They were unable to get IV access to treat medically therefore he was cardioverted in route by 2 shocks. Pt reports symptoms resolved after conversion. He was seen inpt a few years ago for afib RVR, he did not establish care outpt and he stopped taking his medications. He states partially due to cost.       Records from hospital admission course thus far reviewed.      Telemetry reviewed.     INPATIENT MEDICATIONS:  Home medications reviewed.    Current Facility-Administered Medications:     sodium chloride flush 0.9 % injection 5-40 mL, 5-40 mL, IntraVENous, 2 times per day, Ramakrishna Murillo MD    sodium chloride flush 0.9 % injection 5-40 mL, 5-40 mL, IntraVENous, PRN, Ramakrishna Murillo MD    0.9 % sodium chloride infusion, , IntraVENous, PRN, Ramakrishna Murillo MD    potassium chloride (KLOR-CON M) extended release tablet 40 mEq, 40 mEq, Oral, PRN **OR** potassium bicarb-citric acid (EFFER-K) effervescent tablet 40 mEq, 40 mEq, Oral, PRN **OR** potassium chloride 10 mEq/100 mL IVPB (Peripheral Line), 10 mEq, IntraVENous, PRN, Ramakrishna Murillo MD    magnesium sulfate 2000 mg in 50 mL IVPB premix, 2,000 mg, IntraVENous, PRN, Ramakrishna Murillo MD    ondansetron (ZOFRAN-ODT) disintegrating tablet 4 mg, 4 mg, Oral, Q8H PRN **OR** ondansetron (ZOFRAN) injection 4 mg, 4 mg, IntraVENous, Q6H PRN, Ramakrishna Murillo MD    polyethylene

## 2025-06-28 NOTE — ED NOTES
Dr. Anne was contacted about the EKG order even though the patient is being discharged.. Provider was left a message and this writer is waiting for further instruction.

## 2025-06-28 NOTE — CARE COORDINATION
Cm called [facility] transport for dc transport. CM to fax patient info to ; Attn: Kelly. [facility] will call back the cm to confirm ride and  time. Will continue to monitor.

## 2025-06-28 NOTE — CARE COORDINATION
CM set up transport for patient to dc address. [facility] could not provide eta d/t limited availability but will call cm after eta is verified. Patient aware and Charge Nurse aware.

## 2025-06-28 NOTE — ED NOTES
ED TO INPATIENT SBAR HANDOFF    Patient Name: Faisal Baird Jr.   Preferred Name: Faisal  : 1970  54 y.o.   Family/Caregiver Present: no   Code Status Order: Full Code  PO Status: Adult Regular  Telemetry Order: Yes  C-SSRS: Risk of Suicide: No Risk  Sitter none  Restraints:   none  Sepsis Risk Score Sepsis V2 Risk Score: 23.1    Situation  Chief Complaint   Patient presents with    Chest Pain    Irregular Heart Beat     Brief Description of Patient's Condition: Patient arrived from home via EMS cc of chest pains. PTA arrival, received 0.4 SubL nitro and 324 ASA.  Sustained SVT; cardioverted after administration of 2 shocks by EMS. Patient alert and stable on arrival.     Denies chest pains at this time. Bariatric bed order placed. Patient to be transported after 0700. Patient states he has problems ambulating.      Mental Status: oriented, alert, coherent, and logical  Arrived from:Home  Imaging:   XR CHEST 1 VIEW   Final Result   Normal chest.      Electronically signed by SAJAN MIKE        Abnormal labs:   Abnormal Labs Reviewed   COMPREHENSIVE METABOLIC PANEL - Abnormal; Notable for the following components:       Result Value    Glucose 222 (*)     Alk Phosphatase 201 (*)     Albumin 3.0 (*)     Globulin 5.0 (*)     Albumin/Globulin Ratio 0.6 (*)     All other components within normal limits   TSH - Abnormal; Notable for the following components:    TSH, 3rd Generation 5.28 (*)     All other components within normal limits       Background  Allergies: No Known Allergies  History:   Past Medical History:   Diagnosis Date    Arrhythmia     afib, dx 2022    Cancer (HCC)     cancer of the penis, removed about 3 years    DVT (deep venous thrombosis) (HCC)     GERD (gastroesophageal reflux disease)     Hypertension 2020    Morbid obesity (HCC)        Assessment  Vitals:        Vitals:    25 0000 25 0030 25 0130 25 0307   BP: (!) 167/83 (!) 153/87 (!) 151/65 (!) 151/92  time range)     Or   acetaminophen (TYLENOL) suppository 650 mg (has no administration in time range)   metoprolol tartrate (LOPRESSOR) tablet 25 mg (25 mg Oral Given 6/28/25 0307)     Last documented pain medication administration: none  Pertinent or High Risk Medications/Drips: no   If Yes, please provide details: none  Blood Product Administration: no  If Yes, please provide details: none  Process Protocols/Bundles: N/A    Recommendation  Incomplete STAT orders: none  Overdue Medication: See orders  Patient Belongings:    Additional Comments: Patient morbidly obese.    If any further questions, please call Sending RN at Aaron @ [phone]      Admitting Unit Notification  Name of person notified and time: Jessica @ 0321      Electronically signed by: Electronically signed by Vi Walker RN on 6/28/2025 at 3:13 AM

## 2025-06-28 NOTE — DISCHARGE SUMMARY
Physician Discharge Summary     Patient ID:    Faisal Baird Jr.  [id number]  54 y.o.  1970    Admit date: 6/27/2025    Discharge date : 6/28/2025      Final Diagnoses:   SVT (supraventricular tachycardia) [I47.10]  Atrial fibrillation  Secondary hypercoagulable state    Reason for Hospitalization: As above    Hospital Course:   Faisal Baird Jr. is a 54 y.o. male with PMH of atrial fibrillation not on atrial fibrillation, DVT, hypertension. Presented to the ED with chief complaint of chest pain and palpitation. He reports was watching TV, when suddenly has severe chest pain and palpitation. Associated with shortness of breath and lightheadedness. Called EMS which found him to have SVT. Unable to establish IV line, and EMS reports patient almost passing out, therefore attempted cardioversion. Successful cardioversion after second try. Adenosine was not used. Patient's symptoms all resolved after cardioversion. However upon further questioning, patient has not established with cardiology for atrial fibrillation management. Also has not taken any home medication for hypertension or atrial fibrillation.  Now patient does have new insurance and can afford medications as well as outpatient follow-ups    # SVT  - discussed with ED physician and patient about discharge from ED, however given received cardioversion, patient and ED physician feeling safer to have patient stay for observation. Admit to observation.  - Start PO metoprolol, which also helps with hypertension management      # Atrial fibrillation   - GILBERTO-Vasc 1  TSH mildly elevated 5.3  Consult cardiology, recommend outpatient follow-up  Needs to recheck TSH 4 weeks after hospital stay  Start Xarelto  Continue metoprolol  Medication sent to pharmacy  Outpatient follow-up with cardiology and primary care     # Hypertension  - uncontrolled  - start PO metoprolol      # Hyperglycemia  - check HbA1c  Follow-up outpatient    Patient could  PM

## 2025-06-28 NOTE — ED NOTES
Per case management, [facility] won't accept Pt insurance, so Case Management is working on other options at this time.

## 2025-06-28 NOTE — ED TRIAGE NOTES
Patient arrived to the ED via EMS cc of chest pains x 30 mins. On arrival patient was HTN, lethargic,  with change in mentation and HR in 200's . PTA received 0.4 of Nitro , 324 ASA. Per EMS, patient had sustained SVT; no response to vagal maneuvers.Patient shocked twice over 2 minute interval; shocked @ 100 J and 120 J, cardioverted. Patient current in 100-108's, alert and awake in triage; denies chest pains.     Provider at bedside.   No thinners    Hx afib, DM.

## 2025-06-28 NOTE — H&P
History & Physical    Primary Care Provider: Delonte Rowe DO  Source of Information: Patient/family unless mentioned otherwise below    Chief complaint:   Chief Complaint   Patient presents with    Chest Pain    Irregular Heart Beat          HPI & Physical exam     History of presenting illness:    Faisal Baird Jr. is a 54 y.o. male with PMH of atrial fibrillation not on atrial fibrillation, DVT, hypertension. Presented to the ED with chief complaint of chest pain and palpitation. He reports was watching TV, when suddenly has severe chest pain and palpitation. Associated with shortness of breath and lightheadedness. Called EMS which found him to have SVT. Unable to establish IV line, and EMS reports patient almost passing out, therefore attempted cardioversion. Successful cardioversion after second try. Adenosine was not used. Patient's symptoms all resolved after cardioversion. However upon further questioning, patient has not established with cardiology for atrial fibrillation management. Also has not taken any home medication for hypertension or atrial fibrillation. Gets PCP care through tele-health.       In the ED, noted tachycardic however HR keeps < 110. Hypertensive. Noted hyperglycemia, other lab results within normal limits. Troponin negative x2.       Chart review: none   Incidental imaging findings: none       Past Medical History:   Diagnosis Date    Arrhythmia     afib, dx 8/2022    Cancer (HCC)     cancer of the penis, removed about 3 years    DVT (deep venous thrombosis) (HCC)     GERD (gastroesophageal reflux disease)     Hypertension 07/27/2020    Morbid obesity (HCC)          Physical Exam:     BP (!) 151/65   Pulse 94   Temp 98.2 °F (36.8 °C) (Oral)   Resp 26   Ht 1.829 m (6')   Wt (!) 245.6 kg (541 lb 8 oz)   SpO2 99%   BMI 73.44 kg/m²    O2 Device: None (Room air)    General: Alert, cooperative, no distress  Head: Normocephalic, without obvious abnormality, atraumatic.   Neck:  Unable to Pay for Housing in the Last Year: No     Number of Times Moved in the Last Year: 0     Homeless in the Last Year: No          Results:    24 Hour Lab Results:    Recent Results (from the past 24 hours)   CBC with Auto Differential    Collection Time: 06/27/25 11:05 PM   Result Value Ref Range    WBC 10.0 4.1 - 11.1 K/uL    RBC 4.47 4.10 - 5.70 M/uL    Hemoglobin 13.3 12.1 - 17.0 g/dL    Hematocrit 40.0 36.6 - 50.3 %    MCV 89.5 80.0 - 99.0 FL    MCH 29.8 26.0 - 34.0 PG    MCHC 33.3 30.0 - 36.5 g/dL    RDW 13.6 11.5 - 14.5 %    Platelets 279 150 - 400 K/uL    MPV 8.9 8.9 - 12.9 FL    Nucleated RBCs 0.0 0.0  WBC    nRBC 0.00 0.00 - 0.01 K/uL    Neutrophils % 66.9 32.0 - 75.0 %    Lymphocytes % 24.2 12.0 - 49.0 %    Monocytes % 6.7 5.0 - 13.0 %    Eosinophils % 1.2 0.0 - 7.0 %    Basophils % 0.6 0.0 - 1.0 %    Immature Granulocytes % 0.4 0 - 0.5 %    Neutrophils Absolute 6.65 1.80 - 8.00 K/UL    Lymphocytes Absolute 2.41 0.80 - 3.50 K/UL    Monocytes Absolute 0.67 0.00 - 1.00 K/UL    Eosinophils Absolute 0.12 0.00 - 0.40 K/UL    Basophils Absolute 0.06 0.00 - 0.10 K/UL    Immature Granulocytes Absolute 0.04 0.00 - 0.04 K/UL    Differential Type AUTOMATED     Comprehensive Metabolic Panel    Collection Time: 06/27/25 11:05 PM   Result Value Ref Range    Sodium 138 136 - 145 mmol/L    Potassium 4.4 3.5 - 5.1 mmol/L    Chloride 104 97 - 108 mmol/L    CO2 27 21 - 32 mmol/L    Anion Gap 7 2 - 12 mmol/L    Glucose 222 (H) 65 - 100 mg/dL    BUN 13 6 - 20 mg/dL    Creatinine 1.05 0.70 - 1.30 mg/dL    BUN/Creatinine Ratio 12 12 - 20      Est, Glom Filt Rate 85 >60 ml/min/1.73m2    Calcium 8.5 8.5 - 10.1 mg/dL    Total Bilirubin 0.8 0.2 - 1.0 mg/dL    AST 37 15 - 37 U/L    ALT 29 12 - 78 U/L    Alk Phosphatase 201 (H) 45 - 117 U/L    Total Protein 8.0 6.4 - 8.2 g/dL    Albumin 3.0 (L) 3.5 - 5.0 g/dL    Globulin 5.0 (H) 2.0 - 4.0 g/dL    Albumin/Globulin Ratio 0.6 (L) 1.1 - 2.2     Troponin    Collection Time:

## 2025-06-28 NOTE — ED NOTES
Bariatric bed ordered at this time. Drop off time is after 8 am. Nursing Supervisor, Primary RN, and ED Charge made aware. Confirmation # [id number].

## 2025-06-28 NOTE — ED NOTES
Bedside and Verbal shift change report given to Anais (oncoming nurse) by Vi (offgoing nurse). Report included the following information ED SBAR, Adult Overview, MAR, and Recent Results.

## 2025-06-28 NOTE — CARE COORDINATION
06/28/25 0814   Service Assessment   Patient Orientation Alert and Oriented   Cognition Alert   History Provided By Patient   Primary Caregiver Family   Support Systems Family Members   Patient's Healthcare Decision Maker is: Legal Next of Kin   PCP Verified by CM Yes  (Delonte Rowe DO)   Last Visit to PCP Within last two years   Prior Functional Level Assistance with the following:;Bathing;Dressing;Toileting;Cooking;Housework;Shopping;Mobility   Current Functional Level Assistance with the following:;Bathing;Dressing;Toileting;Housework;Cooking;Shopping;Mobility   Can patient return to prior living arrangement Yes   Ability to make needs known: Good   Family able to assist with home care needs: Yes   Would you like for me to discuss the discharge plan with any other family members/significant others, and if so, who? No   Financial Resources Medicare   Community Resources None   CM/SW Referral Other (see comment)   Social/Functional History   Lives With Family   Type of Home Apartment   Home Layout One level   Home Access Ramped entrance   Bathroom Shower/Tub Tub/Shower unit   Bathroom Toilet Standard   Bathroom Equipment Shower chair   Bathroom Accessibility Accessible   Home Equipment None   Receives Help From Family   Discharge Planning   Current Services Prior To Admission None   Potential Assistance Needed N/A   Services At/After Discharge   Mode of Transport at Discharge BLS     Advance Care Planning     General Advance Care Planning (ACP) Conversation    Date of Conversation: 6/28/2025  Conducted with: Patient with Decision Making Capacity  Other persons present: None    Healthcare Decision Maker:   Primary Decision Maker: Yaneth Nolan - Brother/Sister - [phone]       Content/Action Overview:  Has ACP document(s) on file - reflects the patient's care preferences  Reviewed DNR/DNI and patient elects Full Code (Attempt Resuscitation)        Length of Voluntary ACP Conversation in minutes:  <16  minutes (Non-Billable)    Sukumar Walker         Case Management met with the patient to complete the initial case management assessment. No issues or concerns were voiced during the encounter. The patient requires total assistance with activities of daily living (ADLs) and is primarily bedbound. The patient resides with family in a first-floor apartment equipped with a ramp for accessibility. Medical transport is needed for discharge.  The patient has previously received home health services through [facility] and identified them as the preferred provider. There is no reported history of inpatient rehabilitation facility (IRF) or skilled nursing facility (SNF) admissions. Prescriptions are filled at [facility] in [place], [place]. The patient does not currently use any durable medical equipment (DME), home oxygen, or hemodialysis. OBS LETTER COMPLETE.

## 2025-06-28 NOTE — ED PROVIDER NOTES
[facility] EMERGENCY DEPT  EMERGENCY DEPARTMENT HISTORY AND PHYSICAL EXAM      Date of evaluation: 6/27/2025  Patient Name: Faisal Baird Jr.  Birthdate 1970  MRN: [id number]  ED Provider: Ko Stovall DO   Note Started: 11:02 PM EDT 6/27/25    HISTORY OF PRESENT ILLNESS     Chief Complaint   Patient presents with    Chest Pain    Irregular Heart Beat     History Provided By: Patient and EMS    HPI: 54-year-old male with history of atrial fibrillation, penile cancer, DVT, GERD, hypertension, and obesity who presents with chest pain and palpitations that started around 10 PM at home.  EMS was called and when they arrived his heart rate was 210.  He was given aspirin 324 and dose of nitroglycerin.  He was electrically cardioverted with 100 J followed by 180 J with successful cardioversion.  Patient now asymptomatic.  He is not taking anticoagulants or antiplatelets.  States he does not take any medications at all in general.  Asymptomatic now.    PAST MEDICAL HISTORY   Past Medical History:  Past Medical History:   Diagnosis Date    Arrhythmia     afib, dx 8/2022    Cancer (HCC)     cancer of the penis, removed about 3 years    DVT (deep venous thrombosis) (HCC)     GERD (gastroesophageal reflux disease)     Hypertension 07/27/2020    Morbid obesity (HCC)        Past Surgical History:  Past Surgical History:   Procedure Laterality Date    ORTHOPEDIC SURGERY      Ankle    ORTHOPEDIC SURGERY      Tumors removed from left knee     UROLOGICAL SURGERY      Penile surgery       Family History:  Family History   Problem Relation Age of Onset    Lung Disease Father     Hypertension Mother        Social History:  Social History     Tobacco Use    Smoking status: Never    Smokeless tobacco: Never   Substance Use Topics    Alcohol use: Not Currently    Drug use: Never       Allergies:  No Known Allergies    PCP: Delonte Rowe DO    Current Meds:   Current Facility-Administered Medications   Medication Dose Route Frequency

## 2025-06-29 LAB
EKG ATRIAL RATE: 101 BPM
EKG DIAGNOSIS: NORMAL
EKG P AXIS: 51 DEGREES
EKG P-R INTERVAL: 130 MS
EKG Q-T INTERVAL: 332 MS
EKG QRS DURATION: 68 MS
EKG QTC CALCULATION (BAZETT): 430 MS
EKG R AXIS: 6 DEGREES
EKG T AXIS: 15 DEGREES
EKG VENTRICULAR RATE: 101 BPM